# Patient Record
Sex: MALE | Race: WHITE | NOT HISPANIC OR LATINO | Employment: OTHER | URBAN - METROPOLITAN AREA
[De-identification: names, ages, dates, MRNs, and addresses within clinical notes are randomized per-mention and may not be internally consistent; named-entity substitution may affect disease eponyms.]

---

## 2020-09-16 ENCOUNTER — LAB REQUISITION (OUTPATIENT)
Dept: LAB | Facility: HOSPITAL | Age: 70
End: 2020-09-16

## 2020-09-16 DIAGNOSIS — Z00.00 ENCOUNTER FOR GENERAL ADULT MEDICAL EXAMINATION WITHOUT ABNORMAL FINDINGS: ICD-10-CM

## 2020-09-16 LAB
ALBUMIN SERPL-MCNC: 3 G/DL (ref 3.5–5.2)
ALBUMIN/GLOB SERPL: 1.3 G/DL
ALP SERPL-CCNC: 83 U/L (ref 39–117)
ALT SERPL W P-5'-P-CCNC: <5 U/L (ref 1–41)
ANION GAP SERPL CALCULATED.3IONS-SCNC: 9 MMOL/L (ref 5–15)
AST SERPL-CCNC: 16 U/L (ref 1–40)
BASOPHILS # BLD AUTO: 0 10*3/MM3 (ref 0–0.2)
BASOPHILS NFR BLD AUTO: 0.6 % (ref 0–1.5)
BILIRUB SERPL-MCNC: 0.3 MG/DL (ref 0–1.2)
BUN SERPL-MCNC: 15 MG/DL (ref 8–23)
BUN SERPL-MCNC: ABNORMAL MG/DL
BUN/CREAT SERPL: ABNORMAL
CALCIUM SPEC-SCNC: 8.5 MG/DL (ref 8.6–10.5)
CHLORIDE SERPL-SCNC: 108 MMOL/L (ref 98–107)
CO2 SERPL-SCNC: 26 MMOL/L (ref 22–29)
CREAT SERPL-MCNC: 0.71 MG/DL (ref 0.76–1.27)
DEPRECATED RDW RBC AUTO: 43.8 FL (ref 37–54)
EOSINOPHIL # BLD AUTO: 0.1 10*3/MM3 (ref 0–0.4)
EOSINOPHIL NFR BLD AUTO: 1.7 % (ref 0.3–6.2)
ERYTHROCYTE [DISTWIDTH] IN BLOOD BY AUTOMATED COUNT: 14.6 % (ref 12.3–15.4)
GFR SERPL CREATININE-BSD FRML MDRD: 110 ML/MIN/1.73
GFR SERPL CREATININE-BSD FRML MDRD: 134 ML/MIN/1.73
GLOBULIN UR ELPH-MCNC: 2.4 GM/DL
GLUCOSE SERPL-MCNC: 105 MG/DL (ref 65–99)
HCT VFR BLD AUTO: 26.6 % (ref 37.5–51)
HGB BLD-MCNC: 8.9 G/DL (ref 13–17.7)
LYMPHOCYTES # BLD AUTO: 1.3 10*3/MM3 (ref 0.7–3.1)
LYMPHOCYTES NFR BLD AUTO: 16.8 % (ref 19.6–45.3)
MAGNESIUM SERPL-MCNC: 1.7 MG/DL (ref 1.6–2.4)
MCH RBC QN AUTO: 28.6 PG (ref 26.6–33)
MCHC RBC AUTO-ENTMCNC: 33.5 G/DL (ref 31.5–35.7)
MCV RBC AUTO: 85.3 FL (ref 79–97)
MONOCYTES # BLD AUTO: 0.9 10*3/MM3 (ref 0.1–0.9)
MONOCYTES NFR BLD AUTO: 12 % (ref 5–12)
NEUTROPHILS NFR BLD AUTO: 5.4 10*3/MM3 (ref 1.7–7)
NEUTROPHILS NFR BLD AUTO: 68.9 % (ref 42.7–76)
NRBC BLD AUTO-RTO: 0 /100 WBC (ref 0–0.2)
PHOSPHATE SERPL-MCNC: 2.4 MG/DL (ref 2.5–4.5)
PLATELET # BLD AUTO: 389 10*3/MM3 (ref 140–450)
PMV BLD AUTO: 7.3 FL (ref 6–12)
POTASSIUM SERPL-SCNC: 3.8 MMOL/L (ref 3.5–5.2)
PROT SERPL-MCNC: 5.4 G/DL (ref 6–8.5)
RBC # BLD AUTO: 3.12 10*6/MM3 (ref 4.14–5.8)
SODIUM SERPL-SCNC: 143 MMOL/L (ref 136–145)
WBC # BLD AUTO: 7.8 10*3/MM3 (ref 3.4–10.8)

## 2020-09-16 PROCEDURE — 85025 COMPLETE CBC W/AUTO DIFF WBC: CPT | Performed by: PHYSICAL MEDICINE & REHABILITATION

## 2020-09-16 PROCEDURE — 80053 COMPREHEN METABOLIC PANEL: CPT | Performed by: PHYSICAL MEDICINE & REHABILITATION

## 2020-09-16 PROCEDURE — 83735 ASSAY OF MAGNESIUM: CPT | Performed by: PHYSICAL MEDICINE & REHABILITATION

## 2020-09-16 PROCEDURE — 84100 ASSAY OF PHOSPHORUS: CPT | Performed by: PHYSICAL MEDICINE & REHABILITATION

## 2020-09-18 ENCOUNTER — LAB REQUISITION (OUTPATIENT)
Dept: LAB | Facility: HOSPITAL | Age: 70
End: 2020-09-18

## 2020-09-18 DIAGNOSIS — Z00.00 ENCOUNTER FOR GENERAL ADULT MEDICAL EXAMINATION WITHOUT ABNORMAL FINDINGS: ICD-10-CM

## 2020-09-18 LAB
BASOPHILS # BLD AUTO: 0.1 10*3/MM3 (ref 0–0.2)
BASOPHILS NFR BLD AUTO: 0.6 % (ref 0–1.5)
BILIRUB UR QL STRIP: NEGATIVE
CLARITY UR: CLEAR
COLOR UR: YELLOW
DEPRECATED RDW RBC AUTO: 45.9 FL (ref 37–54)
EOSINOPHIL # BLD AUTO: 0.1 10*3/MM3 (ref 0–0.4)
EOSINOPHIL NFR BLD AUTO: 1.3 % (ref 0.3–6.2)
ERYTHROCYTE [DISTWIDTH] IN BLOOD BY AUTOMATED COUNT: 15.1 % (ref 12.3–15.4)
GLUCOSE UR STRIP-MCNC: ABNORMAL MG/DL
HCT VFR BLD AUTO: 28.7 % (ref 37.5–51)
HGB BLD-MCNC: 9.3 G/DL (ref 13–17.7)
HGB UR QL STRIP.AUTO: NEGATIVE
IRON 24H UR-MRATE: 27 MCG/DL (ref 59–158)
KETONES UR QL STRIP: NEGATIVE
LEUKOCYTE ESTERASE UR QL STRIP.AUTO: NEGATIVE
LYMPHOCYTES # BLD AUTO: 1.2 10*3/MM3 (ref 0.7–3.1)
LYMPHOCYTES NFR BLD AUTO: 14 % (ref 19.6–45.3)
MCH RBC QN AUTO: 27.9 PG (ref 26.6–33)
MCHC RBC AUTO-ENTMCNC: 32.6 G/DL (ref 31.5–35.7)
MCV RBC AUTO: 85.7 FL (ref 79–97)
MONOCYTES # BLD AUTO: 1.1 10*3/MM3 (ref 0.1–0.9)
MONOCYTES NFR BLD AUTO: 12.4 % (ref 5–12)
NEUTROPHILS NFR BLD AUTO: 6.3 10*3/MM3 (ref 1.7–7)
NEUTROPHILS NFR BLD AUTO: 71.7 % (ref 42.7–76)
NITRITE UR QL STRIP: NEGATIVE
NRBC BLD AUTO-RTO: 0 /100 WBC (ref 0–0.2)
PH UR STRIP.AUTO: 5.5 [PH] (ref 5–8)
PLATELET # BLD AUTO: 430 10*3/MM3 (ref 140–450)
PMV BLD AUTO: 7.2 FL (ref 6–12)
PROT UR QL STRIP: NEGATIVE
RBC # BLD AUTO: 3.34 10*6/MM3 (ref 4.14–5.8)
SP GR UR STRIP: 1.02 (ref 1–1.03)
UROBILINOGEN UR QL STRIP: ABNORMAL
WBC # BLD AUTO: 8.8 10*3/MM3 (ref 3.4–10.8)

## 2020-09-18 PROCEDURE — 85025 COMPLETE CBC W/AUTO DIFF WBC: CPT

## 2020-09-18 PROCEDURE — 81003 URINALYSIS AUTO W/O SCOPE: CPT

## 2020-09-18 PROCEDURE — 83540 ASSAY OF IRON: CPT

## 2020-09-19 ENCOUNTER — LAB REQUISITION (OUTPATIENT)
Dept: LAB | Facility: HOSPITAL | Age: 70
End: 2020-09-19

## 2020-09-19 DIAGNOSIS — G20 PARKINSON'S DISEASE (HCC): ICD-10-CM

## 2020-09-19 LAB
ANION GAP SERPL CALCULATED.3IONS-SCNC: 11 MMOL/L (ref 5–15)
BUN SERPL-MCNC: 19 MG/DL (ref 8–23)
BUN SERPL-MCNC: ABNORMAL MG/DL
BUN/CREAT SERPL: ABNORMAL
CALCIUM SPEC-SCNC: 8.4 MG/DL (ref 8.6–10.5)
CHLORIDE SERPL-SCNC: 107 MMOL/L (ref 98–107)
CO2 SERPL-SCNC: 27 MMOL/L (ref 22–29)
CREAT SERPL-MCNC: 0.89 MG/DL (ref 0.76–1.27)
GFR SERPL CREATININE-BSD FRML MDRD: 103 ML/MIN/1.73
GFR SERPL CREATININE-BSD FRML MDRD: 85 ML/MIN/1.73
GLUCOSE SERPL-MCNC: 136 MG/DL (ref 65–99)
POTASSIUM SERPL-SCNC: 4.1 MMOL/L (ref 3.5–5.2)
SODIUM SERPL-SCNC: 145 MMOL/L (ref 136–145)

## 2020-09-19 PROCEDURE — 80048 BASIC METABOLIC PNL TOTAL CA: CPT | Performed by: PHYSICAL MEDICINE & REHABILITATION

## 2020-09-21 ENCOUNTER — LAB REQUISITION (OUTPATIENT)
Dept: LAB | Facility: HOSPITAL | Age: 70
End: 2020-09-21

## 2020-09-21 DIAGNOSIS — Z00.00 ENCOUNTER FOR GENERAL ADULT MEDICAL EXAMINATION WITHOUT ABNORMAL FINDINGS: ICD-10-CM

## 2020-09-21 LAB
ANION GAP SERPL CALCULATED.3IONS-SCNC: 8 MMOL/L (ref 5–15)
BACTERIA UR QL AUTO: ABNORMAL /HPF
BILIRUB UR QL STRIP: ABNORMAL
BUN SERPL-MCNC: 21 MG/DL (ref 8–23)
BUN SERPL-MCNC: ABNORMAL MG/DL
BUN/CREAT SERPL: ABNORMAL
CALCIUM SPEC-SCNC: 8.5 MG/DL (ref 8.6–10.5)
CHLORIDE SERPL-SCNC: 108 MMOL/L (ref 98–107)
CLARITY UR: ABNORMAL
CO2 SERPL-SCNC: 27 MMOL/L (ref 22–29)
COLOR UR: ABNORMAL
CREAT SERPL-MCNC: 0.81 MG/DL (ref 0.76–1.27)
DEPRECATED RDW RBC AUTO: 44.6 FL (ref 37–54)
ERYTHROCYTE [DISTWIDTH] IN BLOOD BY AUTOMATED COUNT: 14.9 % (ref 12.3–15.4)
GFR SERPL CREATININE-BSD FRML MDRD: 115 ML/MIN/1.73
GFR SERPL CREATININE-BSD FRML MDRD: 95 ML/MIN/1.73
GLUCOSE SERPL-MCNC: 116 MG/DL (ref 65–99)
GLUCOSE UR STRIP-MCNC: NEGATIVE MG/DL
HCT VFR BLD AUTO: 27.3 % (ref 37.5–51)
HGB BLD-MCNC: 9.3 G/DL (ref 13–17.7)
HGB UR QL STRIP.AUTO: ABNORMAL
HYALINE CASTS UR QL AUTO: ABNORMAL /LPF
KETONES UR QL STRIP: ABNORMAL
LEUKOCYTE ESTERASE UR QL STRIP.AUTO: ABNORMAL
MCH RBC QN AUTO: 29.2 PG (ref 26.6–33)
MCHC RBC AUTO-ENTMCNC: 34.1 G/DL (ref 31.5–35.7)
MCV RBC AUTO: 85.5 FL (ref 79–97)
NITRITE UR QL STRIP: POSITIVE
PH UR STRIP.AUTO: 6 [PH] (ref 5–8)
PLATELET # BLD AUTO: 391 10*3/MM3 (ref 140–450)
PMV BLD AUTO: 7.2 FL (ref 6–12)
POTASSIUM SERPL-SCNC: 4.2 MMOL/L (ref 3.5–5.2)
PROT UR QL STRIP: ABNORMAL
RBC # BLD AUTO: 3.19 10*6/MM3 (ref 4.14–5.8)
RBC # UR: ABNORMAL /HPF
REF LAB TEST METHOD: ABNORMAL
SODIUM SERPL-SCNC: 143 MMOL/L (ref 136–145)
SP GR UR STRIP: >=1.03 (ref 1–1.03)
SQUAMOUS #/AREA URNS HPF: ABNORMAL /HPF
UROBILINOGEN UR QL STRIP: ABNORMAL
WBC # BLD AUTO: 9.9 10*3/MM3 (ref 3.4–10.8)
WBC UR QL AUTO: ABNORMAL /HPF

## 2020-09-21 PROCEDURE — 87077 CULTURE AEROBIC IDENTIFY: CPT | Performed by: PHYSICAL MEDICINE & REHABILITATION

## 2020-09-21 PROCEDURE — 85027 COMPLETE CBC AUTOMATED: CPT | Performed by: PHYSICAL MEDICINE & REHABILITATION

## 2020-09-21 PROCEDURE — 80048 BASIC METABOLIC PNL TOTAL CA: CPT | Performed by: PHYSICAL MEDICINE & REHABILITATION

## 2020-09-21 PROCEDURE — 87186 SC STD MICRODIL/AGAR DIL: CPT | Performed by: PHYSICAL MEDICINE & REHABILITATION

## 2020-09-21 PROCEDURE — 87086 URINE CULTURE/COLONY COUNT: CPT | Performed by: PHYSICAL MEDICINE & REHABILITATION

## 2020-09-21 PROCEDURE — 81001 URINALYSIS AUTO W/SCOPE: CPT | Performed by: PHYSICAL MEDICINE & REHABILITATION

## 2020-09-22 ENCOUNTER — LAB REQUISITION (OUTPATIENT)
Dept: LAB | Facility: HOSPITAL | Age: 70
End: 2020-09-22

## 2020-09-22 DIAGNOSIS — Z00.00 ENCOUNTER FOR GENERAL ADULT MEDICAL EXAMINATION WITHOUT ABNORMAL FINDINGS: ICD-10-CM

## 2020-09-22 LAB
ANION GAP SERPL CALCULATED.3IONS-SCNC: 10 MMOL/L (ref 5–15)
BUN SERPL-MCNC: 25 MG/DL (ref 8–23)
BUN SERPL-MCNC: ABNORMAL MG/DL
BUN/CREAT SERPL: ABNORMAL
CALCIUM SPEC-SCNC: 8.7 MG/DL (ref 8.6–10.5)
CHLORIDE SERPL-SCNC: 106 MMOL/L (ref 98–107)
CO2 SERPL-SCNC: 27 MMOL/L (ref 22–29)
CREAT SERPL-MCNC: 0.97 MG/DL (ref 0.76–1.27)
DEPRECATED RDW RBC AUTO: 45.9 FL (ref 37–54)
ERYTHROCYTE [DISTWIDTH] IN BLOOD BY AUTOMATED COUNT: 15.1 % (ref 12.3–15.4)
GFR SERPL CREATININE-BSD FRML MDRD: 77 ML/MIN/1.73
GFR SERPL CREATININE-BSD FRML MDRD: 93 ML/MIN/1.73
GLUCOSE SERPL-MCNC: 111 MG/DL (ref 65–99)
HCT VFR BLD AUTO: 29.4 % (ref 37.5–51)
HGB BLD-MCNC: 9.8 G/DL (ref 13–17.7)
MCH RBC QN AUTO: 28.5 PG (ref 26.6–33)
MCHC RBC AUTO-ENTMCNC: 33.3 G/DL (ref 31.5–35.7)
MCV RBC AUTO: 85.5 FL (ref 79–97)
PLATELET # BLD AUTO: 418 10*3/MM3 (ref 140–450)
PMV BLD AUTO: 7.2 FL (ref 6–12)
POTASSIUM SERPL-SCNC: 4.3 MMOL/L (ref 3.5–5.2)
RBC # BLD AUTO: 3.44 10*6/MM3 (ref 4.14–5.8)
SODIUM SERPL-SCNC: 143 MMOL/L (ref 136–145)
WBC # BLD AUTO: 10 10*3/MM3 (ref 3.4–10.8)

## 2020-09-22 PROCEDURE — 85027 COMPLETE CBC AUTOMATED: CPT

## 2020-09-22 PROCEDURE — 80048 BASIC METABOLIC PNL TOTAL CA: CPT

## 2020-09-23 ENCOUNTER — LAB REQUISITION (OUTPATIENT)
Dept: LAB | Facility: HOSPITAL | Age: 70
End: 2020-09-23

## 2020-09-23 DIAGNOSIS — Z00.00 ENCOUNTER FOR GENERAL ADULT MEDICAL EXAMINATION WITHOUT ABNORMAL FINDINGS: ICD-10-CM

## 2020-09-23 LAB
ANION GAP SERPL CALCULATED.3IONS-SCNC: 10 MMOL/L (ref 5–15)
BASOPHILS # BLD AUTO: 0 10*3/MM3 (ref 0–0.2)
BASOPHILS NFR BLD AUTO: 0.5 % (ref 0–1.5)
BUN SERPL-MCNC: 26 MG/DL (ref 8–23)
BUN SERPL-MCNC: ABNORMAL MG/DL
BUN/CREAT SERPL: ABNORMAL
CALCIUM SPEC-SCNC: 8.5 MG/DL (ref 8.6–10.5)
CHLORIDE SERPL-SCNC: 105 MMOL/L (ref 98–107)
CO2 SERPL-SCNC: 25 MMOL/L (ref 22–29)
CREAT SERPL-MCNC: 0.97 MG/DL (ref 0.76–1.27)
DEPRECATED RDW RBC AUTO: 47.3 FL (ref 37–54)
EOSINOPHIL # BLD AUTO: 0 10*3/MM3 (ref 0–0.4)
EOSINOPHIL NFR BLD AUTO: 0.4 % (ref 0.3–6.2)
ERYTHROCYTE [DISTWIDTH] IN BLOOD BY AUTOMATED COUNT: 15.5 % (ref 12.3–15.4)
GFR SERPL CREATININE-BSD FRML MDRD: 77 ML/MIN/1.73
GFR SERPL CREATININE-BSD FRML MDRD: 93 ML/MIN/1.73
GLUCOSE SERPL-MCNC: 96 MG/DL (ref 65–99)
HCT VFR BLD AUTO: 32.6 % (ref 37.5–51)
HGB BLD-MCNC: 10.6 G/DL (ref 13–17.7)
LYMPHOCYTES # BLD AUTO: 1.4 10*3/MM3 (ref 0.7–3.1)
LYMPHOCYTES NFR BLD AUTO: 14.8 % (ref 19.6–45.3)
MCH RBC QN AUTO: 27.8 PG (ref 26.6–33)
MCHC RBC AUTO-ENTMCNC: 32.4 G/DL (ref 31.5–35.7)
MCV RBC AUTO: 85.9 FL (ref 79–97)
MONOCYTES # BLD AUTO: 1 10*3/MM3 (ref 0.1–0.9)
MONOCYTES NFR BLD AUTO: 10.1 % (ref 5–12)
NEUTROPHILS NFR BLD AUTO: 7 10*3/MM3 (ref 1.7–7)
NEUTROPHILS NFR BLD AUTO: 74.2 % (ref 42.7–76)
NRBC BLD AUTO-RTO: 0.1 /100 WBC (ref 0–0.2)
PLATELET # BLD AUTO: 454 10*3/MM3 (ref 140–450)
PMV BLD AUTO: 7.5 FL (ref 6–12)
POTASSIUM SERPL-SCNC: 4.4 MMOL/L (ref 3.5–5.2)
RBC # BLD AUTO: 3.8 10*6/MM3 (ref 4.14–5.8)
SODIUM SERPL-SCNC: 140 MMOL/L (ref 136–145)
WBC # BLD AUTO: 9.4 10*3/MM3 (ref 3.4–10.8)

## 2020-09-23 PROCEDURE — 85025 COMPLETE CBC W/AUTO DIFF WBC: CPT | Performed by: PHYSICAL MEDICINE & REHABILITATION

## 2020-09-23 PROCEDURE — U0004 COV-19 TEST NON-CDC HGH THRU: HCPCS

## 2020-09-23 PROCEDURE — 80048 BASIC METABOLIC PNL TOTAL CA: CPT | Performed by: PHYSICAL MEDICINE & REHABILITATION

## 2020-09-24 LAB
BACTERIA SPEC AEROBE CULT: ABNORMAL
BACTERIA SPEC AEROBE CULT: ABNORMAL

## 2020-09-25 LAB — SARS-COV-2 RNA NOSE QL NAA+PROBE: NOT DETECTED

## 2020-09-28 ENCOUNTER — LAB REQUISITION (OUTPATIENT)
Dept: LAB | Facility: HOSPITAL | Age: 70
End: 2020-09-28

## 2020-09-28 DIAGNOSIS — Z00.00 ENCOUNTER FOR GENERAL ADULT MEDICAL EXAMINATION WITHOUT ABNORMAL FINDINGS: ICD-10-CM

## 2020-09-28 LAB
ANION GAP SERPL CALCULATED.3IONS-SCNC: 13 MMOL/L (ref 5–15)
BASOPHILS # BLD AUTO: 0 10*3/MM3 (ref 0–0.2)
BASOPHILS NFR BLD AUTO: 0.4 % (ref 0–1.5)
BUN SERPL-MCNC: 30 MG/DL (ref 8–23)
BUN SERPL-MCNC: ABNORMAL MG/DL
BUN/CREAT SERPL: ABNORMAL
CALCIUM SPEC-SCNC: 8.7 MG/DL (ref 8.6–10.5)
CHLORIDE SERPL-SCNC: 103 MMOL/L (ref 98–107)
CO2 SERPL-SCNC: 24 MMOL/L (ref 22–29)
CREAT SERPL-MCNC: 1.22 MG/DL (ref 0.76–1.27)
DEPRECATED RDW RBC AUTO: 47.7 FL (ref 37–54)
EOSINOPHIL # BLD AUTO: 0.1 10*3/MM3 (ref 0–0.4)
EOSINOPHIL NFR BLD AUTO: 0.7 % (ref 0.3–6.2)
ERYTHROCYTE [DISTWIDTH] IN BLOOD BY AUTOMATED COUNT: 15.8 % (ref 12.3–15.4)
GFR SERPL CREATININE-BSD FRML MDRD: 59 ML/MIN/1.73
GFR SERPL CREATININE-BSD FRML MDRD: 72 ML/MIN/1.73
GLUCOSE SERPL-MCNC: 136 MG/DL (ref 65–99)
HCT VFR BLD AUTO: 32 % (ref 37.5–51)
HGB BLD-MCNC: 10.3 G/DL (ref 13–17.7)
LYMPHOCYTES # BLD AUTO: 2 10*3/MM3 (ref 0.7–3.1)
LYMPHOCYTES NFR BLD AUTO: 22.3 % (ref 19.6–45.3)
MCH RBC QN AUTO: 27.5 PG (ref 26.6–33)
MCHC RBC AUTO-ENTMCNC: 32.2 G/DL (ref 31.5–35.7)
MCV RBC AUTO: 85.5 FL (ref 79–97)
MONOCYTES # BLD AUTO: 1.3 10*3/MM3 (ref 0.1–0.9)
MONOCYTES NFR BLD AUTO: 13.8 % (ref 5–12)
NEUTROPHILS NFR BLD AUTO: 5.7 10*3/MM3 (ref 1.7–7)
NEUTROPHILS NFR BLD AUTO: 62.8 % (ref 42.7–76)
NRBC BLD AUTO-RTO: 0.2 /100 WBC (ref 0–0.2)
PLATELET # BLD AUTO: 352 10*3/MM3 (ref 140–450)
PMV BLD AUTO: 7.7 FL (ref 6–12)
POTASSIUM SERPL-SCNC: 3.9 MMOL/L (ref 3.5–5.2)
RBC # BLD AUTO: 3.74 10*6/MM3 (ref 4.14–5.8)
SODIUM SERPL-SCNC: 140 MMOL/L (ref 136–145)
WBC # BLD AUTO: 9.1 10*3/MM3 (ref 3.4–10.8)

## 2020-09-28 PROCEDURE — 85025 COMPLETE CBC W/AUTO DIFF WBC: CPT | Performed by: PHYSICAL MEDICINE & REHABILITATION

## 2020-09-28 PROCEDURE — 80048 BASIC METABOLIC PNL TOTAL CA: CPT | Performed by: PHYSICAL MEDICINE & REHABILITATION

## 2020-09-30 ENCOUNTER — LAB REQUISITION (OUTPATIENT)
Dept: LAB | Facility: HOSPITAL | Age: 70
End: 2020-09-30

## 2020-09-30 DIAGNOSIS — Z00.00 ENCOUNTER FOR GENERAL ADULT MEDICAL EXAMINATION WITHOUT ABNORMAL FINDINGS: ICD-10-CM

## 2020-09-30 LAB
ANION GAP SERPL CALCULATED.3IONS-SCNC: 11 MMOL/L (ref 5–15)
BUN SERPL-MCNC: 32 MG/DL (ref 8–23)
BUN SERPL-MCNC: ABNORMAL MG/DL
BUN/CREAT SERPL: ABNORMAL
CALCIUM SPEC-SCNC: 9.2 MG/DL (ref 8.6–10.5)
CHLORIDE SERPL-SCNC: 104 MMOL/L (ref 98–107)
CO2 SERPL-SCNC: 27 MMOL/L (ref 22–29)
CREAT SERPL-MCNC: 1.15 MG/DL (ref 0.76–1.27)
DEPRECATED RDW RBC AUTO: 46.4 FL (ref 37–54)
ERYTHROCYTE [DISTWIDTH] IN BLOOD BY AUTOMATED COUNT: 15.2 % (ref 12.3–15.4)
GFR SERPL CREATININE-BSD FRML MDRD: 63 ML/MIN/1.73
GFR SERPL CREATININE-BSD FRML MDRD: 77 ML/MIN/1.73
GLUCOSE SERPL-MCNC: 129 MG/DL (ref 65–99)
HCT VFR BLD AUTO: 29.9 % (ref 37.5–51)
HGB BLD-MCNC: 9.7 G/DL (ref 13–17.7)
MCH RBC QN AUTO: 27.9 PG (ref 26.6–33)
MCHC RBC AUTO-ENTMCNC: 32.5 G/DL (ref 31.5–35.7)
MCV RBC AUTO: 85.7 FL (ref 79–97)
PLATELET # BLD AUTO: 288 10*3/MM3 (ref 140–450)
PMV BLD AUTO: 8.2 FL (ref 6–12)
POTASSIUM SERPL-SCNC: 4.8 MMOL/L (ref 3.5–5.2)
RBC # BLD AUTO: 3.49 10*6/MM3 (ref 4.14–5.8)
SODIUM SERPL-SCNC: 142 MMOL/L (ref 136–145)
WBC # BLD AUTO: 7.6 10*3/MM3 (ref 3.4–10.8)

## 2020-09-30 PROCEDURE — 80048 BASIC METABOLIC PNL TOTAL CA: CPT | Performed by: PHYSICAL MEDICINE & REHABILITATION

## 2020-09-30 PROCEDURE — 85027 COMPLETE CBC AUTOMATED: CPT | Performed by: PHYSICAL MEDICINE & REHABILITATION

## 2020-10-01 ENCOUNTER — HOSPITAL ENCOUNTER (OUTPATIENT)
Dept: GENERAL RADIOLOGY | Facility: HOSPITAL | Age: 70
Discharge: HOME | End: 2020-10-01
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
## 2020-10-01 PROCEDURE — 72110 X-RAY EXAM L-2 SPINE 4/>VWS: CPT

## 2020-10-01 PROCEDURE — 72072 X-RAY EXAM THORAC SPINE 3VWS: CPT

## 2020-10-02 ENCOUNTER — LAB REQUISITION (OUTPATIENT)
Dept: LAB | Facility: HOSPITAL | Age: 70
End: 2020-10-02

## 2020-10-02 DIAGNOSIS — Z00.00 ENCOUNTER FOR GENERAL ADULT MEDICAL EXAMINATION WITHOUT ABNORMAL FINDINGS: ICD-10-CM

## 2020-10-02 LAB
ANION GAP SERPL CALCULATED.3IONS-SCNC: 10 MMOL/L (ref 5–15)
BUN SERPL-MCNC: 25 MG/DL (ref 8–23)
BUN SERPL-MCNC: ABNORMAL MG/DL
BUN/CREAT SERPL: ABNORMAL
CALCIUM SPEC-SCNC: 8.6 MG/DL (ref 8.6–10.5)
CHLORIDE SERPL-SCNC: 105 MMOL/L (ref 98–107)
CO2 SERPL-SCNC: 28 MMOL/L (ref 22–29)
CREAT SERPL-MCNC: 0.78 MG/DL (ref 0.76–1.27)
GFR SERPL CREATININE-BSD FRML MDRD: 99 ML/MIN/1.73
GLUCOSE SERPL-MCNC: 102 MG/DL (ref 65–99)
POTASSIUM SERPL-SCNC: 4.2 MMOL/L (ref 3.5–5.2)
SODIUM SERPL-SCNC: 143 MMOL/L (ref 136–145)

## 2020-10-02 PROCEDURE — 80048 BASIC METABOLIC PNL TOTAL CA: CPT

## 2024-10-11 ENCOUNTER — APPOINTMENT (OUTPATIENT)
Dept: GENERAL RADIOLOGY | Facility: HOSPITAL | Age: 74
End: 2024-10-11
Payer: MEDICARE

## 2024-10-11 ENCOUNTER — HOSPITAL ENCOUNTER (INPATIENT)
Facility: HOSPITAL | Age: 74
LOS: 1 days | Discharge: NURSING, REHAB OR LONG-TERM CARE | End: 2024-10-14
Attending: EMERGENCY MEDICINE | Admitting: INTERNAL MEDICINE
Payer: MEDICARE

## 2024-10-11 DIAGNOSIS — N39.0 URINARY TRACT INFECTION IN MALE: Primary | ICD-10-CM

## 2024-10-11 DIAGNOSIS — Z86.59 HISTORY OF DEMENTIA: ICD-10-CM

## 2024-10-11 DIAGNOSIS — G93.40 ACUTE ENCEPHALOPATHY: ICD-10-CM

## 2024-10-11 LAB
ALBUMIN SERPL-MCNC: 3.9 G/DL (ref 3.5–5.2)
ALBUMIN/GLOB SERPL: 1.2 G/DL
ALP SERPL-CCNC: 122 U/L (ref 39–117)
ALT SERPL W P-5'-P-CCNC: 9 U/L (ref 1–41)
ANION GAP SERPL CALCULATED.3IONS-SCNC: 9 MMOL/L (ref 5–15)
AST SERPL-CCNC: 8 U/L (ref 1–40)
B-OH-BUTYR SERPL-SCNC: 0.1 MMOL/L (ref 0.02–0.27)
BASOPHILS # BLD AUTO: 0.04 10*3/MM3 (ref 0–0.2)
BASOPHILS NFR BLD AUTO: 0.4 % (ref 0–1.5)
BILIRUB SERPL-MCNC: 0.3 MG/DL (ref 0–1.2)
BUN SERPL-MCNC: 21 MG/DL (ref 8–23)
BUN/CREAT SERPL: 16.5 (ref 7–25)
CALCIUM SPEC-SCNC: 9.7 MG/DL (ref 8.6–10.5)
CHLORIDE SERPL-SCNC: 106 MMOL/L (ref 98–107)
CO2 SERPL-SCNC: 27 MMOL/L (ref 22–29)
CREAT SERPL-MCNC: 1.27 MG/DL (ref 0.76–1.27)
DEPRECATED RDW RBC AUTO: 46.4 FL (ref 37–54)
EGFRCR SERPLBLD CKD-EPI 2021: 60 ML/MIN/1.73
EOSINOPHIL # BLD AUTO: 0.12 10*3/MM3 (ref 0–0.4)
EOSINOPHIL NFR BLD AUTO: 1.2 % (ref 0.3–6.2)
ERYTHROCYTE [DISTWIDTH] IN BLOOD BY AUTOMATED COUNT: 14.7 % (ref 12.3–15.4)
GLOBULIN UR ELPH-MCNC: 3.2 GM/DL
GLUCOSE SERPL-MCNC: 126 MG/DL (ref 65–99)
HCT VFR BLD AUTO: 39.3 % (ref 37.5–51)
HGB BLD-MCNC: 13.1 G/DL (ref 13–17.7)
IMM GRANULOCYTES # BLD AUTO: 0.05 10*3/MM3 (ref 0–0.05)
IMM GRANULOCYTES NFR BLD AUTO: 0.5 % (ref 0–0.5)
LYMPHOCYTES # BLD AUTO: 2.89 10*3/MM3 (ref 0.7–3.1)
LYMPHOCYTES NFR BLD AUTO: 28.9 % (ref 19.6–45.3)
MAGNESIUM SERPL-MCNC: 2.4 MG/DL (ref 1.6–2.4)
MCH RBC QN AUTO: 29 PG (ref 26.6–33)
MCHC RBC AUTO-ENTMCNC: 33.3 G/DL (ref 31.5–35.7)
MCV RBC AUTO: 87.1 FL (ref 79–97)
MONOCYTES # BLD AUTO: 1.19 10*3/MM3 (ref 0.1–0.9)
MONOCYTES NFR BLD AUTO: 11.9 % (ref 5–12)
NEUTROPHILS NFR BLD AUTO: 5.7 10*3/MM3 (ref 1.7–7)
NEUTROPHILS NFR BLD AUTO: 57.1 % (ref 42.7–76)
NRBC BLD AUTO-RTO: 0 /100 WBC (ref 0–0.2)
PLATELET # BLD AUTO: 237 10*3/MM3 (ref 140–450)
PMV BLD AUTO: 9.7 FL (ref 6–12)
POTASSIUM SERPL-SCNC: 4.1 MMOL/L (ref 3.5–5.2)
PROT SERPL-MCNC: 7.1 G/DL (ref 6–8.5)
RBC # BLD AUTO: 4.51 10*6/MM3 (ref 4.14–5.8)
SODIUM SERPL-SCNC: 142 MMOL/L (ref 136–145)
WBC NRBC COR # BLD AUTO: 9.99 10*3/MM3 (ref 3.4–10.8)

## 2024-10-11 PROCEDURE — 71045 X-RAY EXAM CHEST 1 VIEW: CPT

## 2024-10-11 PROCEDURE — 85025 COMPLETE CBC W/AUTO DIFF WBC: CPT | Performed by: EMERGENCY MEDICINE

## 2024-10-11 PROCEDURE — 83735 ASSAY OF MAGNESIUM: CPT | Performed by: EMERGENCY MEDICINE

## 2024-10-11 PROCEDURE — 82010 KETONE BODYS QUAN: CPT | Performed by: EMERGENCY MEDICINE

## 2024-10-11 PROCEDURE — 99285 EMERGENCY DEPT VISIT HI MDM: CPT

## 2024-10-11 PROCEDURE — 80053 COMPREHEN METABOLIC PANEL: CPT | Performed by: EMERGENCY MEDICINE

## 2024-10-12 PROBLEM — G20.A1 PARKINSON DISEASE: Status: ACTIVE | Noted: 2024-10-12

## 2024-10-12 PROBLEM — N39.0 URINARY TRACT INFECTION IN MALE: Status: ACTIVE | Noted: 2024-10-12

## 2024-10-12 PROBLEM — Z97.8 CHRONIC INDWELLING FOLEY CATHETER: Status: ACTIVE | Noted: 2024-10-12

## 2024-10-12 PROBLEM — F03.90 DEMENTIA: Status: ACTIVE | Noted: 2024-10-12

## 2024-10-12 LAB
ALBUMIN SERPL-MCNC: 3.5 G/DL (ref 3.5–5.2)
ALBUMIN/GLOB SERPL: 1.2 G/DL
ALP SERPL-CCNC: 107 U/L (ref 39–117)
ALT SERPL W P-5'-P-CCNC: 12 U/L (ref 1–41)
ANION GAP SERPL CALCULATED.3IONS-SCNC: 8.9 MMOL/L (ref 5–15)
AST SERPL-CCNC: 14 U/L (ref 1–40)
BACTERIA UR QL AUTO: ABNORMAL /HPF
BILIRUB SERPL-MCNC: 0.2 MG/DL (ref 0–1.2)
BILIRUB UR QL STRIP: NEGATIVE
BUN SERPL-MCNC: 19 MG/DL (ref 8–23)
BUN/CREAT SERPL: 15.2 (ref 7–25)
CALCIUM SPEC-SCNC: 8.7 MG/DL (ref 8.6–10.5)
CHLORIDE SERPL-SCNC: 109 MMOL/L (ref 98–107)
CLARITY UR: ABNORMAL
CO2 SERPL-SCNC: 27.1 MMOL/L (ref 22–29)
COLOR UR: YELLOW
CREAT SERPL-MCNC: 1.25 MG/DL (ref 0.76–1.27)
DEPRECATED RDW RBC AUTO: 48.6 FL (ref 37–54)
EGFRCR SERPLBLD CKD-EPI 2021: 61.2 ML/MIN/1.73
ERYTHROCYTE [DISTWIDTH] IN BLOOD BY AUTOMATED COUNT: 15 % (ref 12.3–15.4)
GLOBULIN UR ELPH-MCNC: 3 GM/DL
GLUCOSE BLDC GLUCOMTR-MCNC: 91 MG/DL (ref 70–130)
GLUCOSE SERPL-MCNC: 78 MG/DL (ref 65–99)
GLUCOSE UR STRIP-MCNC: NEGATIVE MG/DL
HCT VFR BLD AUTO: 37.9 % (ref 37.5–51)
HGB BLD-MCNC: 12.2 G/DL (ref 13–17.7)
HGB UR QL STRIP.AUTO: ABNORMAL
HYALINE CASTS UR QL AUTO: ABNORMAL /LPF
KETONES UR QL STRIP: ABNORMAL
LEUKOCYTE ESTERASE UR QL STRIP.AUTO: ABNORMAL
MCH RBC QN AUTO: 28.6 PG (ref 26.6–33)
MCHC RBC AUTO-ENTMCNC: 32.2 G/DL (ref 31.5–35.7)
MCV RBC AUTO: 88.8 FL (ref 79–97)
NITRITE UR QL STRIP: POSITIVE
PH UR STRIP.AUTO: 5.5 [PH] (ref 5–8)
PLATELET # BLD AUTO: 214 10*3/MM3 (ref 140–450)
PMV BLD AUTO: 9.5 FL (ref 6–12)
POTASSIUM SERPL-SCNC: 4.1 MMOL/L (ref 3.5–5.2)
PROT SERPL-MCNC: 6.5 G/DL (ref 6–8.5)
PROT UR QL STRIP: ABNORMAL
RBC # BLD AUTO: 4.27 10*6/MM3 (ref 4.14–5.8)
RBC # UR STRIP: ABNORMAL /HPF
REF LAB TEST METHOD: ABNORMAL
SODIUM SERPL-SCNC: 145 MMOL/L (ref 136–145)
SP GR UR STRIP: 1.02 (ref 1–1.03)
SQUAMOUS #/AREA URNS HPF: ABNORMAL /HPF
UROBILINOGEN UR QL STRIP: ABNORMAL
WBC # UR STRIP: ABNORMAL /HPF
WBC NRBC COR # BLD AUTO: 11.24 10*3/MM3 (ref 3.4–10.8)

## 2024-10-12 PROCEDURE — P9612 CATHETERIZE FOR URINE SPEC: HCPCS

## 2024-10-12 PROCEDURE — 87086 URINE CULTURE/COLONY COUNT: CPT | Performed by: EMERGENCY MEDICINE

## 2024-10-12 PROCEDURE — 85027 COMPLETE CBC AUTOMATED: CPT | Performed by: NURSE PRACTITIONER

## 2024-10-12 PROCEDURE — G0378 HOSPITAL OBSERVATION PER HR: HCPCS

## 2024-10-12 PROCEDURE — 80053 COMPREHEN METABOLIC PANEL: CPT | Performed by: NURSE PRACTITIONER

## 2024-10-12 PROCEDURE — 87077 CULTURE AEROBIC IDENTIFY: CPT | Performed by: EMERGENCY MEDICINE

## 2024-10-12 PROCEDURE — 81001 URINALYSIS AUTO W/SCOPE: CPT | Performed by: EMERGENCY MEDICINE

## 2024-10-12 PROCEDURE — 87186 SC STD MICRODIL/AGAR DIL: CPT | Performed by: EMERGENCY MEDICINE

## 2024-10-12 PROCEDURE — 25010000002 CEFTRIAXONE PER 250 MG: Performed by: EMERGENCY MEDICINE

## 2024-10-12 PROCEDURE — 82948 REAGENT STRIP/BLOOD GLUCOSE: CPT

## 2024-10-12 RX ORDER — BISACODYL 5 MG/1
5 TABLET, DELAYED RELEASE ORAL DAILY PRN
Status: DISCONTINUED | OUTPATIENT
Start: 2024-10-12 | End: 2024-10-14 | Stop reason: HOSPADM

## 2024-10-12 RX ORDER — ACETAMINOPHEN 160 MG/5ML
650 SOLUTION ORAL EVERY 4 HOURS PRN
Status: DISCONTINUED | OUTPATIENT
Start: 2024-10-12 | End: 2024-10-14 | Stop reason: HOSPADM

## 2024-10-12 RX ORDER — ACETAMINOPHEN 325 MG/1
650 TABLET ORAL EVERY 4 HOURS PRN
Status: DISCONTINUED | OUTPATIENT
Start: 2024-10-12 | End: 2024-10-14 | Stop reason: HOSPADM

## 2024-10-12 RX ORDER — NITROGLYCERIN 0.4 MG/1
0.4 TABLET SUBLINGUAL
Status: DISCONTINUED | OUTPATIENT
Start: 2024-10-12 | End: 2024-10-14 | Stop reason: HOSPADM

## 2024-10-12 RX ORDER — SODIUM CHLORIDE 0.9 % (FLUSH) 0.9 %
10 SYRINGE (ML) INJECTION AS NEEDED
Status: DISCONTINUED | OUTPATIENT
Start: 2024-10-12 | End: 2024-10-14 | Stop reason: HOSPADM

## 2024-10-12 RX ORDER — BISACODYL 10 MG
10 SUPPOSITORY, RECTAL RECTAL DAILY PRN
Status: DISCONTINUED | OUTPATIENT
Start: 2024-10-12 | End: 2024-10-14 | Stop reason: HOSPADM

## 2024-10-12 RX ORDER — FAMOTIDINE 20 MG/1
20 TABLET, FILM COATED ORAL 2 TIMES DAILY PRN
Status: DISCONTINUED | OUTPATIENT
Start: 2024-10-12 | End: 2024-10-14 | Stop reason: HOSPADM

## 2024-10-12 RX ORDER — ACETAMINOPHEN 650 MG/1
650 SUPPOSITORY RECTAL EVERY 4 HOURS PRN
Status: DISCONTINUED | OUTPATIENT
Start: 2024-10-12 | End: 2024-10-14 | Stop reason: HOSPADM

## 2024-10-12 RX ORDER — POLYETHYLENE GLYCOL 3350 17 G/17G
17 POWDER, FOR SOLUTION ORAL DAILY PRN
Status: DISCONTINUED | OUTPATIENT
Start: 2024-10-12 | End: 2024-10-14 | Stop reason: HOSPADM

## 2024-10-12 RX ORDER — AMOXICILLIN 250 MG
2 CAPSULE ORAL 2 TIMES DAILY PRN
Status: DISCONTINUED | OUTPATIENT
Start: 2024-10-12 | End: 2024-10-14 | Stop reason: HOSPADM

## 2024-10-12 RX ORDER — ONDANSETRON 2 MG/ML
4 INJECTION INTRAMUSCULAR; INTRAVENOUS EVERY 6 HOURS PRN
Status: DISCONTINUED | OUTPATIENT
Start: 2024-10-12 | End: 2024-10-14 | Stop reason: HOSPADM

## 2024-10-12 RX ADMIN — CEFTRIAXONE 2000 MG: 2 INJECTION, POWDER, FOR SOLUTION INTRAMUSCULAR; INTRAVENOUS at 02:40

## 2024-10-12 NOTE — ED NOTES
"Nursing report ED to floor  Singh Hatch  74 y.o.  male    HPI :  HPI  Stated Reason for Visit: [facility].  Per NH staff BG was 358, now 154 for EMS.  Pt is now at baseline A&O x4 (does not know year, knows month).  Per EMS stroke scale was negative. Pt with hx of Parkinson's, bladder ca, and dementia.  History Obtained From: EMS    Chief Complaint  Chief Complaint   Patient presents with    Altered Mental Status    Hyperglycemia       Admitting doctor:   Phong West MD    Admitting diagnosis:   The primary encounter diagnosis was Urinary tract infection in male. Diagnoses of Acute encephalopathy and History of dementia were also pertinent to this visit.    Code status:   Current Code Status       Date Active Code Status Order ID Comments User Context       Not on file            Allergies:   Patient has no known allergies.    Isolation:   No active isolations    Intake and Output  No intake or output data in the 24 hours ending 10/12/24 0220    Weight:       10/11/24  2301   Weight: 70.3 kg (155 lb)       Most recent vitals:   Vitals:    10/11/24 2147 10/11/24 2301 10/12/24 0001   BP: 128/70  122/77   Pulse: 90  74   Resp: 16     Temp: 98.8 °F (37.1 °C)     TempSrc: Tympanic     SpO2: 98%  97%   Weight:  70.3 kg (155 lb)    Height:  175.3 cm (69\")        Active LDAs/IV Access:   Lines, Drains & Airways       Active LDAs       Name Placement date Placement time Site Days    Peripheral IV 10/11/24 2300 Anterior;Proximal;Right Forearm 10/11/24  2300  Forearm  less than 1                    Labs (abnormal labs have a star):   Labs Reviewed   COMPREHENSIVE METABOLIC PANEL - Abnormal; Notable for the following components:       Result Value    Glucose 126 (*)     Alkaline Phosphatase 122 (*)     eGFR 60.0 (*)     All other components within normal limits    Narrative:     GFR Normal >60  Chronic Kidney Disease <60  Kidney Failure <15    The GFR formula is " only valid for adults with stable renal function between ages 18 and 70.   URINALYSIS W/ MICROSCOPIC IF INDICATED (NO CULTURE) - Abnormal; Notable for the following components:    Appearance, UA Turbid (*)     Ketones, UA Trace (*)     Blood, UA Moderate (2+) (*)     Protein,  mg/dL (2+) (*)     Leuk Esterase, UA Large (3+) (*)     Nitrite, UA Positive (*)     All other components within normal limits   CBC WITH AUTO DIFFERENTIAL - Abnormal; Notable for the following components:    Monocytes, Absolute 1.19 (*)     All other components within normal limits   URINALYSIS, MICROSCOPIC ONLY - Abnormal; Notable for the following components:    RBC, UA 11-20 (*)     WBC, UA Too Numerous to Count (*)     Bacteria, UA 4+ (*)     Squamous Epithelial Cells, UA 7-12 (*)     All other components within normal limits   MAGNESIUM - Normal   BETA HYDROXYBUTYRATE QUANTITATIVE - Normal    Narrative:     In the assessment of possible diabetic ketoacidosis, the test should be interpreted along with other clinical and laboratory findings.  A level greater than 1 mmol/L should require further evaluation and levels of more than 3 mmol/L require immediate medical review.   URINALYSIS W/ CULTURE IF INDICATED   CBC AND DIFFERENTIAL    Narrative:     The following orders were created for panel order CBC & Differential.  Procedure                               Abnormality         Status                     ---------                               -----------         ------                     CBC Auto Differential[[id number]]        Abnormal            Final result                 Please view results for these tests on the individual orders.   KETONE BODIES SERUM    Narrative:     The following orders were created for panel order Ketone Bodies, Serum (Not performed at [place]).  Procedure                               Abnormality         Status                     ---------                               -----------         ------                      Beta Hydroxybutyrate Dick...[[id number]]  Normal              Final result                 Please view results for these tests on the individual orders.       EKG:   No orders to display       Meds given in ED:   Medications   cefTRIAXone (ROCEPHIN) 2,000 mg in sodium chloride 0.9 % 100 mL MBP (has no administration in time range)       Imaging results:  XR Chest 1 View    Result Date: 10/11/2024  No acute findings.  This report was finalized on 10/11/2024 10:47 PM by Dr. Marlene Krishnan M.D on Workstation: [id number]       Ambulatory status:   - bed rest    Social issues:   Social History     Socioeconomic History    Marital status:        Peripheral Neurovascular  Peripheral Neurovascular (Adult)  Peripheral Neurovascular WDL: WDL    Neuro Cognitive  Neuro Cognitive (Adult)  Cognitive/Neuro/Behavioral WDL: .WDL except, orientation, level of consciousness  Level of Consciousness: Responds to Voice  Orientation: disoriented to, person, place    Learning  Learning Assessment  Learning Readiness and Ability: physical limitation noted, cognitive limitation noted    Respiratory  Respiratory WDL  Respiratory WDL: WDL    Abdominal Pain  Safety Interventions  Safety Precautions/Falls Reduction: assistive device/personal items within reach, nonskid shoes/slippers when out of bed, fall reduction program maintained, family at bedside    Pain Assessments  Pain (Adult)  (0-10) Pain Rating: Rest: 0  (0-10) Pain Rating: Activity: 0    NIH Stroke Scale       Tiffany Menendez RN  10/12/24 02:20 EDT

## 2024-10-12 NOTE — ED PROVIDER NOTES
EMERGENCY DEPARTMENT ENCOUNTER    Room Number:  21/21  PCP: Kimberly Tovar DO  Historian: EMS      HPI:  Chief Complaint: Ultimately status, hyperglycemia  A complete HPI/ROS/PMH/PSH/SH/FH are unobtainable due to: Patient condition    Context: Singh Hatch is a 74 y.o. male who presents to the ED via [facility] from [facility] for hyperglycemia and unresponsiveness c/o acute hyperglycemia of 358 with unresponsiveness, improved at 154 with EMS prior to arrival.  History of Parkinson's and bladder cancer and dementia.  Patient with indwelling Holland catheter that appears to be infected.  Patient unable to provide any review of systems at this time.      MEDICAL RECORD REVIEW    External (non-ED) record review: Discharge summary reviewed from August 27, 2024 from [place] after being admitted August 23, 2024 for severe sepsis with a UTI              PAST MEDICAL HISTORY  Active Ambulatory Problems     Diagnosis Date Noted    No Active Ambulatory Problems     Resolved Ambulatory Problems     Diagnosis Date Noted    No Resolved Ambulatory Problems     No Additional Past Medical History         PAST SURGICAL HISTORY  History reviewed. No pertinent surgical history.      FAMILY HISTORY  History reviewed. No pertinent family history.      SOCIAL HISTORY  Social History     Socioeconomic History    Marital status:          ALLERGIES  Patient has no known allergies.        REVIEW OF SYSTEMS  Review of Systems     All systems reviewed and negative except for those discussed in HPI.       PHYSICAL EXAM    I have reviewed the triage vital signs and nursing notes.    ED Triage Vitals [10/11/24 2147]   Temp Heart Rate Resp BP SpO2   98.8 °F (37.1 °C) 90 16 128/70 98 %      Temp src Heart Rate Source Patient Position BP Location FiO2 (%)   Tympanic -- -- -- --       Physical Exam  General: Chronically ill-appearing, nondiaphoretic  HEENT: Mucous membranes tacky, atraumatic, EOMI  Neck: Full ROM  Pulm: Symmetric  chest rise, nonlabored, lungs CTAB  Cardiovascular: Regular rate and rhythm, intact distal pulses  GI: Soft, nontender, nondistended, no rebound, no guarding, bowel sounds present  : Indwelling Holland catheter with cloudy urine with sediment  MSK: Full ROM, no deformity  Skin: Warm, dry  Neuro: Drowsy, moving all extremities  Psych: Calm, cooperative        LAB RESULTS  Recent Results (from the past 24 hours)   Comprehensive Metabolic Panel    Collection Time: 10/11/24 11:00 PM    Specimen: Blood   Result Value Ref Range    Glucose 126 (H) 65 - 99 mg/dL    BUN 21 8 - 23 mg/dL    Creatinine 1.27 0.76 - 1.27 mg/dL    Sodium 142 136 - 145 mmol/L    Potassium 4.1 3.5 - 5.2 mmol/L    Chloride 106 98 - 107 mmol/L    CO2 27.0 22.0 - 29.0 mmol/L    Calcium 9.7 8.6 - 10.5 mg/dL    Total Protein 7.1 6.0 - 8.5 g/dL    Albumin 3.9 3.5 - 5.2 g/dL    ALT (SGPT) 9 1 - 41 U/L    AST (SGOT) 8 1 - 40 U/L    Alkaline Phosphatase 122 (H) 39 - 117 U/L    Total Bilirubin 0.3 0.0 - 1.2 mg/dL    Globulin 3.2 gm/dL    A/G Ratio 1.2 g/dL    BUN/Creatinine Ratio 16.5 7.0 - 25.0    Anion Gap 9.0 5.0 - 15.0 mmol/L    eGFR 60.0 (L) >60.0 mL/min/1.73   Magnesium    Collection Time: 10/11/24 11:00 PM    Specimen: Blood   Result Value Ref Range    Magnesium 2.4 1.6 - 2.4 mg/dL   CBC Auto Differential    Collection Time: 10/11/24 11:00 PM    Specimen: Blood   Result Value Ref Range    WBC 9.99 3.40 - 10.80 10*3/mm3    RBC 4.51 4.14 - 5.80 10*6/mm3    Hemoglobin 13.1 13.0 - 17.7 g/dL    Hematocrit 39.3 37.5 - 51.0 %    MCV 87.1 79.0 - 97.0 fL    MCH 29.0 26.6 - 33.0 pg    MCHC 33.3 31.5 - 35.7 g/dL    RDW 14.7 12.3 - 15.4 %    RDW-SD 46.4 37.0 - 54.0 fl    MPV 9.7 6.0 - 12.0 fL    Platelets 237 140 - 450 10*3/mm3    Neutrophil % 57.1 42.7 - 76.0 %    Lymphocyte % 28.9 19.6 - 45.3 %    Monocyte % 11.9 5.0 - 12.0 %    Eosinophil % 1.2 0.3 - 6.2 %    Basophil % 0.4 0.0 - 1.5 %    Immature Grans % 0.5 0.0 - 0.5 %    Neutrophils, Absolute 5.70 1.70 -  7.00 10*3/mm3    Lymphocytes, Absolute 2.89 0.70 - 3.10 10*3/mm3    Monocytes, Absolute 1.19 (H) 0.10 - 0.90 10*3/mm3    Eosinophils, Absolute 0.12 0.00 - 0.40 10*3/mm3    Basophils, Absolute 0.04 0.00 - 0.20 10*3/mm3    Immature Grans, Absolute 0.05 0.00 - 0.05 10*3/mm3    nRBC 0.0 0.0 - 0.2 /100 WBC   Beta Hydroxybutyrate Quantitative    Collection Time: 10/11/24 11:00 PM    Specimen: Blood   Result Value Ref Range    Beta-Hydroxybutyrate Quant 0.102 0.020 - 0.270 mmol/L   Urinalysis With Microscopic If Indicated (No Culture) - Indwelling Urethral Catheter    Collection Time: 10/12/24 12:15 AM    Specimen: Indwelling Urethral Catheter; Urine   Result Value Ref Range    Color, UA Yellow Yellow, Straw    Appearance, UA Turbid (A) Clear    pH, UA 5.5 5.0 - 8.0    Specific Gravity, UA 1.017 1.005 - 1.030    Glucose, UA Negative Negative    Ketones, UA Trace (A) Negative    Bilirubin, UA Negative Negative    Blood, UA Moderate (2+) (A) Negative    Protein,  mg/dL (2+) (A) Negative    Leuk Esterase, UA Large (3+) (A) Negative    Nitrite, UA Positive (A) Negative    Urobilinogen, UA 1.0 E.U./dL 0.2 - 1.0 E.U./dL   Urinalysis, Microscopic Only - Indwelling Urethral Catheter    Collection Time: 10/12/24 12:15 AM    Specimen: Indwelling Urethral Catheter; Urine   Result Value Ref Range    RBC, UA 11-20 (A) None Seen, 0-2 /HPF    WBC, UA Too Numerous to Count (A) None Seen, 0-2 /HPF    Bacteria, UA 4+ (A) None Seen /HPF    Squamous Epithelial Cells, UA 7-12 (A) None Seen, 0-2 /HPF    Hyaline Casts, UA 13-20 None Seen /LPF    Methodology Automated Microscopy        Ordered the above labs and independently interpreted results. My findings will be discussed in the medical decision making section below        RADIOLOGY  XR Chest 1 View    Result Date: 10/11/2024  SINGLE VIEW OF THE CHEST  HISTORY: Hyperglycemia  COMPARISON: None available.  FINDINGS: There is cardiomegaly. There is no vascular congestion. No pneumothorax  or pleural effusion is seen. No acute infiltrates are identified. There are advanced degenerative changes of the right shoulder.      No acute findings.  This report was finalized on 10/11/2024 10:47 PM by Dr. Marlene Krishnan M.D on Workstation: [id number]       Ordered the above noted radiological studies.  Independently interpreted by me and my independent review of findings can be found in the ED Course.  See dictation for official radiology interpretation.      PROCEDURES    Procedures        MEDICATIONS GIVEN IN ER    Medications   cefTRIAXone (ROCEPHIN) 2,000 mg in sodium chloride 0.9 % 100 mL MBP (has no administration in time range)         PROGRESS, DATA ANALYSIS, CONSULTS, AND MEDICAL DECISION MAKING    Please note that this section constitutes my independent interpretation of clinical data including lab results, radiology, EKG's.  This constitutes my independent professional opinion regarding differential diagnosis and management of this patient.  It may include any factors such as history from outside sources, review of external records, social determinants of health, management of medications, response to those treatments, and discussions with other providers.    Differential Diagnosis and Plan: Initial concern for DKA, sepsis, urinary tract infection, renal failure, electrolyte abnormalities, pneumonia, among others.  Plan for labs, supportive care, reevaluation with results.    Additional sources:  - Discussed/ obtained information from independent historians: EMS reported improvement in blood glucose down to 154 prior to arrival     - (Social Determinants of Health): None     - Shared decision making:      ED Course as of 10/12/24 0220   Fri Oct 11, 2024   2218 XR Chest 1 View  Per my independent interpretation of the chest x-ray, no evidence of pneumothorax or obvious pneumonia [DC]   2332 WBC: 9.99 [DC]   2332 Hemoglobin: 13.1 [DC]   2332 Platelets: 237 [DC]   2350 Magnesium: 2.4 [DC]   2350  Beta-Hydroxybutyrate Quant: 0.102 [DC]   2350 Glucose(!): 126 [DC]   2350 BUN: 21 [DC]   2350 Creatinine: 1.27 [DC]   2350 Sodium: 142 [DC]   2350 Potassium: 4.1 [DC]   2350 ALT (SGPT): 9 [DC]   2350 AST (SGOT): 8 [DC]   2350 Alkaline Phosphatase(!): 122 [DC]   2350 Total Bilirubin: 0.3 [DC]   Sat Oct 12, 2024   0056 Nitrite, UA(!): Positive [DC]   0056 Leukocytes, UA(!): Large (3+) [DC]   0056 Bacteria, UA(!): 4+ [DC]   0056 WBC, UA(!): Too Numerous to Count [DC]   0056 RBC, UA(!): 11-20 [DC]   0219 Discussed with HENNA Piña, LHA, discussed patient's clinical course and findings today, treatment modalities, and need for hospitalization. [DC]      ED Course User Index  [DC] Himanshu Rey MD     Hemodynamically controlled, findings of urinary tract infection, will plan for antibiotics and further monitoring given the encephalopathy and hyperglycemic issues earlier today.    Hospitalization Considered?: yes    Orders Placed During This Visit:  Orders Placed This Encounter   Procedures    XR Chest 1 View    Comprehensive Metabolic Panel    Urinalysis With Microscopic If Indicated (No Culture) - Urine, Catheter    Magnesium    CBC Auto Differential    Beta Hydroxybutyrate Quantitative    Urinalysis, Microscopic Only - Urine, Clean Catch    Urinalysis With Culture If Indicated - Indwelling Urethral Catheter    LHA (on-call MD unless specified) Details    Initiate Observation Status    CBC & Differential    Ketone Bodies, Serum (Not performed at [place])       Additional orders considered but not placed:      Independent interpretation of labs, radiology studies, and discussions with consultants: See ED Course        AS OF 02:20 EDT VITALS:    BP - 122/77  HR - 74  TEMP - 98.8 °F (37.1 °C) (Tympanic)  02 SATS - 97%          DIAGNOSIS  Final diagnoses:   Urinary tract infection in male   Acute encephalopathy   History of dementia         DISPOSITION  ED Disposition       ED Disposition   Decision to Admit     Condition   --    Comment   Level of Care: Med/Surg [1]   Diagnosis: Urinary tract infection in male [[id number]]   Admitting Physician: KARSTEN POON [[id number]]   Attending Physician: KARSTEN POON [[id number]]   Is patient appropriate for Inpatient Observation Unit?: No [0]                  Please note that portions of this document were completed with a voice recognition program.    Note Disclaimer: At [facility], we believe that sharing information builds trust and better relationships. You are receiving this note because you recently visited [facility]. It is possible you will see health information before a provider has talked with you about it. This kind of information can be easy to misunderstand. To help you fully understand what it means for your health, we urge you to discuss this note with your provider.                       Himanshu Rey MD  10/12/24 0220

## 2024-10-12 NOTE — PLAN OF CARE
Goal Outcome Evaluation:               Patient is oriented to self only.  He has only awakened to eat and immediately goes back to sleep.    Patient has not indicated pain at any time during the shift.    TM

## 2024-10-12 NOTE — H&P
Patient Name:  Singh Hatch  YOB: 1950  MRN:  [id number]  Admit Date:  10/11/2024  Patient Care Team:  Kimberly Tovar DO as PCP - General (Physical Medicine and Rehabilitation)      Subjective   History Present Illness     Chief Complaint   Patient presents with    Altered Mental Status    Hyperglycemia         History of Present Illness  This is a 74-year-old male with history of dementia, Parkinson's disease, presents to the hospital, from nursing home, was found to have significant hyperglycemia, and was briefly unresponsive.  His blood glucose level was found to be above 350.  Patient has indwelling Holland catheter, and suspicion was that patient has current UTI, was given Rocephin in the emergency room, will be admitted to hospitalist service for further workup of symptoms.  Patient denies fever, chills.    Review of Systems   Review of Systems   Constitutional: Negative for activity change and appetite change.   HENT: Negative for congestion and dental problem.    Eyes: Negative for discharge and itching.   Respiratory: Negative for apnea and chest tightness.    Cardiovascular: Negative for chest pain and palpitations.   Gastrointestinal: Negative for abdominal distention and abdominal pain.   Endocrine: Negative for cold intolerance and heat intolerance.   Genitourinary: Negative for difficulty urinating and frequency.   Musculoskeletal: Negative for arthralgias and back pain.   Skin: Negative for color change and pallor.   Allergic/Immunologic: Negative for environmental allergies and food allergies.   Neurological: Negative for dizziness and facial asymmetry.   Hematological: Negative for adenopathy. Does not bruise/bleed easily.   Psychiatric/Behavioral: Negative for agitation and suicidal ideas.       Personal History     History reviewed. No pertinent past medical history.  History reviewed. No pertinent surgical history.  History reviewed. No pertinent family history.  Social  History     Tobacco Use    Smoking status: Never     Passive exposure: Never    Smokeless tobacco: Never   Vaping Use    Vaping status: Never Used   Substance Use Topics    Alcohol use: Never    Drug use: Not Currently     No current facility-administered medications on file prior to encounter.     No current outpatient medications on file prior to encounter.     No Known Allergies    Objective    Objective     Vital Signs  Temp:  [97.9 °F (36.6 °C)-98.8 °F (37.1 °C)] 97.9 °F (36.6 °C)  Heart Rate:  [68-90] 74  Resp:  [16] 16  BP: (119-136)/(67-77) 119/74  SpO2:  [95 %-99 %] 97 %  on  Flow (L/min) (Oxygen Therapy):  [2] 2;   Device (Oxygen Therapy): room air  Body mass index is 22.81 kg/m².    Physical Exam  General, awake and alert.  Head and ENT, normocephalic and atraumatic.  Lungs, symmetric expansion, equal air entry bilaterally.  Heart, regular rate and rhythm.  Abdomen, soft and nontender.  Extremities, no clubbing or cyanosis.  Neuro, no focal deficits.  Skin: Warm and no rash.  Psych, normal mood and affect.  Musculoskeletal, joint examination is grossly normal.    Results Review:  I reviewed the patient's new clinical results.  I reviewed the patient's new imaging results and agree with the interpretation.  I reviewed the patient's other test results and agree with the interpretation  I personally viewed and interpreted the patient's EKG/Telemetry data  Discussed with ED provider.    Lab Results (last 24 hours)       Procedure Component Value Units Date/Time    CBC & Differential [[id number]]  (Abnormal) Collected: 10/11/24 2300    Specimen: Blood Updated: 10/11/24 2318    Narrative:      The following orders were created for panel order CBC & Differential.  Procedure                               Abnormality         Status                     ---------                               -----------         ------                     CBC Auto Differential[[id number]]        Abnormal            Final result                  Please view results for these tests on the individual orders.    Comprehensive Metabolic Panel [[id number]]  (Abnormal) Collected: 10/11/24 2300    Specimen: Blood Updated: 10/11/24 2342     Glucose 126 mg/dL      BUN 21 mg/dL      Creatinine 1.27 mg/dL      Sodium 142 mmol/L      Potassium 4.1 mmol/L      Chloride 106 mmol/L      CO2 27.0 mmol/L      Calcium 9.7 mg/dL      Total Protein 7.1 g/dL      Albumin 3.9 g/dL      ALT (SGPT) 9 U/L      AST (SGOT) 8 U/L      Alkaline Phosphatase 122 U/L      Total Bilirubin 0.3 mg/dL      Globulin 3.2 gm/dL      A/G Ratio 1.2 g/dL      BUN/Creatinine Ratio 16.5     Anion Gap 9.0 mmol/L      eGFR 60.0 mL/min/1.73     Narrative:      GFR Normal >60  Chronic Kidney Disease <60  Kidney Failure <15    The GFR formula is only valid for adults with stable renal function between ages 18 and 70.    Ketone Bodies, Serum (Not performed at [place]) [[id number]]  (Normal) Collected: 10/11/24 2300    Specimen: Blood Updated: 10/11/24 2342    Narrative:      The following orders were created for panel order Ketone Bodies, Serum (Not performed at [place]).  Procedure                               Abnormality         Status                     ---------                               -----------         ------                     Beta Hydroxybutyrate Dick...[[id number]]  Normal              Final result                 Please view results for these tests on the individual orders.    Magnesium [[id number]]  (Normal) Collected: 10/11/24 2300    Specimen: Blood Updated: 10/11/24 2342     Magnesium 2.4 mg/dL     CBC Auto Differential [[id number]]  (Abnormal) Collected: 10/11/24 2300    Specimen: Blood Updated: 10/11/24 2318     WBC 9.99 10*3/mm3      RBC 4.51 10*6/mm3      Hemoglobin 13.1 g/dL      Hematocrit 39.3 %      MCV 87.1 fL      MCH 29.0 pg      MCHC 33.3 g/dL      RDW 14.7 %      RDW-SD 46.4 fl      MPV 9.7 fL      Platelets 237 10*3/mm3      Neutrophil % 57.1 %      Lymphocyte %  28.9 %      Monocyte % 11.9 %      Eosinophil % 1.2 %      Basophil % 0.4 %      Immature Grans % 0.5 %      Neutrophils, Absolute 5.70 10*3/mm3      Lymphocytes, Absolute 2.89 10*3/mm3      Monocytes, Absolute 1.19 10*3/mm3      Eosinophils, Absolute 0.12 10*3/mm3      Basophils, Absolute 0.04 10*3/mm3      Immature Grans, Absolute 0.05 10*3/mm3      nRBC 0.0 /100 WBC     Beta Hydroxybutyrate Quantitative [[id number]]  (Normal) Collected: 10/11/24 2300    Specimen: Blood Updated: 10/11/24 2342     Beta-Hydroxybutyrate Quant 0.102 mmol/L     Narrative:      In the assessment of possible diabetic ketoacidosis, the test should be interpreted along with other clinical and laboratory findings.  A level greater than 1 mmol/L should require further evaluation and levels of more than 3 mmol/L require immediate medical review.    Urinalysis With Microscopic If Indicated (No Culture) - Indwelling Urethral Catheter [[id number]]  (Abnormal) Collected: 10/12/24 0015    Specimen: Urine from Indwelling Urethral Catheter Updated: 10/12/24 0045     Color, UA Yellow     Appearance, UA Turbid     pH, UA 5.5     Specific Gravity, UA 1.017     Glucose, UA Negative     Ketones, UA Trace     Bilirubin, UA Negative     Blood, UA Moderate (2+)     Protein,  mg/dL (2+)     Leuk Esterase, UA Large (3+)     Nitrite, UA Positive     Urobilinogen, UA 1.0 E.U./dL    Urinalysis, Microscopic Only - Indwelling Urethral Catheter [[id number]]  (Abnormal) Collected: 10/12/24 0015    Specimen: Urine from Indwelling Urethral Catheter Updated: 10/12/24 0046     RBC, UA 11-20 /HPF      WBC, UA Too Numerous to Count /HPF      Bacteria, UA 4+ /HPF      Squamous Epithelial Cells, UA 7-12 /HPF      Hyaline Casts, UA 13-20 /LPF      Methodology Automated Microscopy    Urine Culture - Urine, Indwelling Urethral Catheter [[id number]] Collected: 10/12/24 0015    Specimen: Urine from Indwelling Urethral Catheter Updated: 10/12/24 0948    POC Glucose Once  [[id number]]  (Normal) Collected: 10/12/24 0429    Specimen: Blood Updated: 10/12/24 0430     Glucose 91 mg/dL     CBC (No Diff) [[id number]]  (Abnormal) Collected: 10/12/24 0640    Specimen: Blood Updated: 10/12/24 0659     WBC 11.24 10*3/mm3      RBC 4.27 10*6/mm3      Hemoglobin 12.2 g/dL      Hematocrit 37.9 %      MCV 88.8 fL      MCH 28.6 pg      MCHC 32.2 g/dL      RDW 15.0 %      RDW-SD 48.6 fl      MPV 9.5 fL      Platelets 214 10*3/mm3     Comprehensive Metabolic Panel [[id number]]  (Abnormal) Collected: 10/12/24 0640    Specimen: Blood Updated: 10/12/24 0725     Glucose 78 mg/dL      BUN 19 mg/dL      Creatinine 1.25 mg/dL      Sodium 145 mmol/L      Potassium 4.1 mmol/L      Chloride 109 mmol/L      CO2 27.1 mmol/L      Calcium 8.7 mg/dL      Total Protein 6.5 g/dL      Albumin 3.5 g/dL      ALT (SGPT) 12 U/L      AST (SGOT) 14 U/L      Alkaline Phosphatase 107 U/L      Total Bilirubin 0.2 mg/dL      Globulin 3.0 gm/dL      A/G Ratio 1.2 g/dL      BUN/Creatinine Ratio 15.2     Anion Gap 8.9 mmol/L      eGFR 61.2 mL/min/1.73     Narrative:      GFR Normal >60  Chronic Kidney Disease <60  Kidney Failure <15    The GFR formula is only valid for adults with stable renal function between ages 18 and 70.            Imaging Results (Last 24 Hours)       Procedure Component Value Units Date/Time    XR Chest 1 View [[id number]] Collected: 10/11/24 2246     Updated: 10/11/24 2250    Narrative:      SINGLE VIEW OF THE CHEST     HISTORY: Hyperglycemia     COMPARISON: None available.     FINDINGS:  There is cardiomegaly. There is no vascular congestion. No pneumothorax  or pleural effusion is seen. No acute infiltrates are identified. There  are advanced degenerative changes of the right shoulder.       Impression:      No acute findings.     This report was finalized on 10/11/2024 10:47 PM by Dr. Marlene Krishnan M.D on Workstation: [id number]                   No orders to display        Assessment/Plan     Active  Hospital Problems    Diagnosis  POA    **Urinary tract infection in male [N39.0]  Yes    Chronic indwelling Holland catheter [Z97.8]  Not Applicable    Dementia [F03.90]  Yes    Parkinson disease [G20.A1]  Yes      Resolved Hospital Problems   No resolved problems to display.     Assessment and plan  1.  Acute encephalopathy due to underlying UTI, patient received Rocephin in the emergency room.  Follow urine cultures, infectious disease consultation.    2.  Dementia, Parkinson's disease, chronic condition, continue home medications, supportive management.    3.  CODE STATUS is full code.  Further plans based on hospital course.       Phong West MD  [facility]  10/12/24  15:33 EDT

## 2024-10-12 NOTE — SIGNIFICANT NOTE
10/12/24 1125   OTHER   Discipline physical therapist   Rehab Time/Intention   Session Not Performed other (see comments)  (Nsg reports he is on bedrest orders, attempting to get ahold of MD to transfer floors. Will f/u tomorrow if appropriate.)   Therapy Assessment/Plan (PT)   Criteria for Skilled Interventions Met (PT) yes   Recommendation   PT - Next Appointment 10/13/24

## 2024-10-12 NOTE — PLAN OF CARE
Goal Outcome Evaluation:      Patient arrived to 3P from ED via stretcher by transport tech at 345am. Patient alert and oriented x 2 (self and place), and reportedly admitted for UTI. Patient reportedly arrived from [facility] to [facility] ED following an unresponsive episode as well as a hyperglycemic episode of 358. Pt is DM II, blood glucose 91 upon arrival to 3P. Diabetic education consult initiated. Patient is on room air, denies pain. Patient is a poor historian and unable to give last bowel movement date. Pt has 20g in left forearm (saline locked), U/A positive. Rocephin 2g dosed in ED. Chest xray negative. Pt reportedly d/c from [place] on 08/23/24 following severe sepsis w/UTI. Generalized bruising noted to B arms and discoloration spots noted to back. Redness noted to B knees however, right knee has red, dry, scabbed spots. Right small open wound to buttock, wound care consult initiated and area covered with mepilex. Red circles to chest where r/t electrode placement. Holland cath in place w/sediment noted inside bag and tubing.  consult initiated as patient will need transportation back to [facility] where patient reports he resides. Patient reports using a power chair for daily mobility purposes however, pt reports he can stand and pivot. Pt advised to call for assistance with needs until PT/OT has evaluated him therefore, bed alarm is on. Pt reports wearing glasses however, this nurse did not see them. Pt denies difficulty eating and swallowing. Pt unsure if he has received flu injection this season. Due to patient a/o x 2 there were multiple sections of admission which could not be completed at this time. Pt unsure of recent weight loss and is unable to verify current medications. Plan of care is ongoing.

## 2024-10-13 PROBLEM — E11.65 TYPE 2 DIABETES MELLITUS WITH HYPERGLYCEMIA, WITH LONG-TERM CURRENT USE OF INSULIN: Status: ACTIVE | Noted: 2024-10-13

## 2024-10-13 PROBLEM — G93.41 ACUTE METABOLIC ENCEPHALOPATHY: Status: ACTIVE | Noted: 2024-10-13

## 2024-10-13 PROBLEM — Z79.4 TYPE 2 DIABETES MELLITUS WITH HYPERGLYCEMIA, WITH LONG-TERM CURRENT USE OF INSULIN: Status: ACTIVE | Noted: 2024-10-13

## 2024-10-13 LAB
ALBUMIN SERPL-MCNC: 3.6 G/DL (ref 3.5–5.2)
ALBUMIN/GLOB SERPL: 1.2 G/DL
ALP SERPL-CCNC: 116 U/L (ref 39–117)
ALT SERPL W P-5'-P-CCNC: 13 U/L (ref 1–41)
ANION GAP SERPL CALCULATED.3IONS-SCNC: 9 MMOL/L (ref 5–15)
AST SERPL-CCNC: 10 U/L (ref 1–40)
BASOPHILS # BLD AUTO: 0.04 10*3/MM3 (ref 0–0.2)
BASOPHILS NFR BLD AUTO: 0.4 % (ref 0–1.5)
BILIRUB SERPL-MCNC: 0.3 MG/DL (ref 0–1.2)
BUN SERPL-MCNC: 25 MG/DL (ref 8–23)
BUN/CREAT SERPL: 18.7 (ref 7–25)
CALCIUM SPEC-SCNC: 9.3 MG/DL (ref 8.6–10.5)
CHLORIDE SERPL-SCNC: 108 MMOL/L (ref 98–107)
CO2 SERPL-SCNC: 27 MMOL/L (ref 22–29)
CREAT SERPL-MCNC: 1.34 MG/DL (ref 0.76–1.27)
DEPRECATED RDW RBC AUTO: 49.5 FL (ref 37–54)
EGFRCR SERPLBLD CKD-EPI 2021: 56.3 ML/MIN/1.73
EOSINOPHIL # BLD AUTO: 0.14 10*3/MM3 (ref 0–0.4)
EOSINOPHIL NFR BLD AUTO: 1.6 % (ref 0.3–6.2)
ERYTHROCYTE [DISTWIDTH] IN BLOOD BY AUTOMATED COUNT: 15 % (ref 12.3–15.4)
GLOBULIN UR ELPH-MCNC: 3.1 GM/DL
GLUCOSE BLDC GLUCOMTR-MCNC: 121 MG/DL (ref 70–130)
GLUCOSE BLDC GLUCOMTR-MCNC: 232 MG/DL (ref 70–130)
GLUCOSE BLDC GLUCOMTR-MCNC: 260 MG/DL (ref 70–130)
GLUCOSE SERPL-MCNC: 149 MG/DL (ref 65–99)
HBA1C MFR BLD: 8.1 % (ref 4.8–5.6)
HCT VFR BLD AUTO: 38.8 % (ref 37.5–51)
HGB BLD-MCNC: 12.5 G/DL (ref 13–17.7)
IMM GRANULOCYTES # BLD AUTO: 0.03 10*3/MM3 (ref 0–0.05)
IMM GRANULOCYTES NFR BLD AUTO: 0.3 % (ref 0–0.5)
LYMPHOCYTES # BLD AUTO: 2.46 10*3/MM3 (ref 0.7–3.1)
LYMPHOCYTES NFR BLD AUTO: 27.3 % (ref 19.6–45.3)
MCH RBC QN AUTO: 29 PG (ref 26.6–33)
MCHC RBC AUTO-ENTMCNC: 32.2 G/DL (ref 31.5–35.7)
MCV RBC AUTO: 90 FL (ref 79–97)
MONOCYTES # BLD AUTO: 1.03 10*3/MM3 (ref 0.1–0.9)
MONOCYTES NFR BLD AUTO: 11.4 % (ref 5–12)
NEUTROPHILS NFR BLD AUTO: 5.3 10*3/MM3 (ref 1.7–7)
NEUTROPHILS NFR BLD AUTO: 59 % (ref 42.7–76)
NRBC BLD AUTO-RTO: 0 /100 WBC (ref 0–0.2)
PLATELET # BLD AUTO: 205 10*3/MM3 (ref 140–450)
PMV BLD AUTO: 9.7 FL (ref 6–12)
POTASSIUM SERPL-SCNC: 4.6 MMOL/L (ref 3.5–5.2)
PROT SERPL-MCNC: 6.7 G/DL (ref 6–8.5)
RBC # BLD AUTO: 4.31 10*6/MM3 (ref 4.14–5.8)
SODIUM SERPL-SCNC: 144 MMOL/L (ref 136–145)
WBC NRBC COR # BLD AUTO: 9 10*3/MM3 (ref 3.4–10.8)

## 2024-10-13 PROCEDURE — 63710000001 INSULIN LISPRO (HUMAN) PER 5 UNITS: Performed by: HOSPITALIST

## 2024-10-13 PROCEDURE — 80053 COMPREHEN METABOLIC PANEL: CPT | Performed by: INTERNAL MEDICINE

## 2024-10-13 PROCEDURE — 97166 OT EVAL MOD COMPLEX 45 MIN: CPT

## 2024-10-13 PROCEDURE — 82948 REAGENT STRIP/BLOOD GLUCOSE: CPT

## 2024-10-13 PROCEDURE — 83036 HEMOGLOBIN GLYCOSYLATED A1C: CPT | Performed by: HOSPITALIST

## 2024-10-13 PROCEDURE — 85025 COMPLETE CBC W/AUTO DIFF WBC: CPT | Performed by: INTERNAL MEDICINE

## 2024-10-13 PROCEDURE — 97535 SELF CARE MNGMENT TRAINING: CPT

## 2024-10-13 PROCEDURE — 99223 1ST HOSP IP/OBS HIGH 75: CPT | Performed by: INTERNAL MEDICINE

## 2024-10-13 PROCEDURE — 97162 PT EVAL MOD COMPLEX 30 MIN: CPT

## 2024-10-13 RX ORDER — NICOTINE POLACRILEX 4 MG
15 LOZENGE BUCCAL
COMMUNITY

## 2024-10-13 RX ORDER — ERGOCALCIFEROL 1.25 MG/1
50000 CAPSULE, LIQUID FILLED ORAL WEEKLY
COMMUNITY

## 2024-10-13 RX ORDER — MICONAZOLE NITRATE 20 MG/G
1 CREAM TOPICAL 2 TIMES DAILY
COMMUNITY

## 2024-10-13 RX ORDER — MIRTAZAPINE 15 MG/1
15 TABLET, ORALLY DISINTEGRATING ORAL NIGHTLY
Status: DISCONTINUED | OUTPATIENT
Start: 2024-10-13 | End: 2024-10-14 | Stop reason: HOSPADM

## 2024-10-13 RX ORDER — INSULIN LISPRO 100 [IU]/ML
2-10 INJECTION, SOLUTION INTRAVENOUS; SUBCUTANEOUS
COMMUNITY

## 2024-10-13 RX ORDER — ASPIRIN 81 MG/1
81 TABLET ORAL DAILY
Status: DISCONTINUED | OUTPATIENT
Start: 2024-10-13 | End: 2024-10-14 | Stop reason: HOSPADM

## 2024-10-13 RX ORDER — CARBIDOPA/LEVODOPA 25MG-250MG
1.5 TABLET ORAL 4 TIMES DAILY
COMMUNITY

## 2024-10-13 RX ORDER — NICOTINE POLACRILEX 4 MG
15 LOZENGE BUCCAL
Status: DISCONTINUED | OUTPATIENT
Start: 2024-10-13 | End: 2024-10-14 | Stop reason: HOSPADM

## 2024-10-13 RX ORDER — UREA 10 %
1000 LOTION (ML) TOPICAL DAILY
Status: DISCONTINUED | OUTPATIENT
Start: 2024-10-13 | End: 2024-10-14 | Stop reason: HOSPADM

## 2024-10-13 RX ORDER — CARBIDOPA AND LEVODOPA 50; 200 MG/1; MG/1
1 TABLET, EXTENDED RELEASE ORAL NIGHTLY
COMMUNITY

## 2024-10-13 RX ORDER — MIRTAZAPINE 15 MG/1
15 TABLET, ORALLY DISINTEGRATING ORAL NIGHTLY
COMMUNITY

## 2024-10-13 RX ORDER — ASPIRIN 81 MG/1
81 TABLET ORAL DAILY
COMMUNITY

## 2024-10-13 RX ORDER — LANOLIN ALCOHOL/MO/W.PET/CERES
1000 CREAM (GRAM) TOPICAL DAILY
COMMUNITY

## 2024-10-13 RX ORDER — DEXTROSE MONOHYDRATE 25 G/50ML
25 INJECTION, SOLUTION INTRAVENOUS
Status: DISCONTINUED | OUTPATIENT
Start: 2024-10-13 | End: 2024-10-14 | Stop reason: HOSPADM

## 2024-10-13 RX ORDER — INSULIN LISPRO 100 [IU]/ML
2-9 INJECTION, SOLUTION INTRAVENOUS; SUBCUTANEOUS
Status: DISCONTINUED | OUTPATIENT
Start: 2024-10-13 | End: 2024-10-14 | Stop reason: HOSPADM

## 2024-10-13 RX ORDER — ERGOCALCIFEROL 1.25 MG/1
50000 CAPSULE, LIQUID FILLED ORAL WEEKLY
Status: DISCONTINUED | OUTPATIENT
Start: 2024-10-16 | End: 2024-10-14 | Stop reason: HOSPADM

## 2024-10-13 RX ORDER — CARBIDOPA/LEVODOPA 25MG-250MG
1.5 TABLET ORAL 4 TIMES DAILY
Status: DISCONTINUED | OUTPATIENT
Start: 2024-10-13 | End: 2024-10-14 | Stop reason: HOSPADM

## 2024-10-13 RX ORDER — IBUPROFEN 600 MG/1
1 TABLET ORAL
Status: DISCONTINUED | OUTPATIENT
Start: 2024-10-13 | End: 2024-10-14 | Stop reason: HOSPADM

## 2024-10-13 RX ORDER — CARBIDOPA AND LEVODOPA 50; 200 MG/1; MG/1
1 TABLET, EXTENDED RELEASE ORAL NIGHTLY
Status: DISCONTINUED | OUTPATIENT
Start: 2024-10-13 | End: 2024-10-14 | Stop reason: HOSPADM

## 2024-10-13 RX ADMIN — INSULIN LISPRO 4 UNITS: 100 INJECTION, SOLUTION INTRAVENOUS; SUBCUTANEOUS at 21:05

## 2024-10-13 RX ADMIN — CARBIDOPA AND LEVODOPA 1 TABLET: 50; 200 TABLET, EXTENDED RELEASE ORAL at 21:05

## 2024-10-13 RX ADMIN — Medication 1000 MCG: at 10:30

## 2024-10-13 RX ADMIN — ASPIRIN 81 MG: 81 TABLET, COATED ORAL at 10:30

## 2024-10-13 RX ADMIN — INSULIN LISPRO 4 UNITS: 100 INJECTION, SOLUTION INTRAVENOUS; SUBCUTANEOUS at 12:25

## 2024-10-13 RX ADMIN — MIRTAZAPINE 15 MG: 15 TABLET, ORALLY DISINTEGRATING ORAL at 21:05

## 2024-10-13 RX ADMIN — CARBIDOPA AND LEVODOPA 1.5 TABLET: 25; 250 TABLET ORAL at 17:35

## 2024-10-13 RX ADMIN — CARBIDOPA AND LEVODOPA 1.5 TABLET: 25; 250 TABLET ORAL at 21:05

## 2024-10-13 RX ADMIN — CARBIDOPA AND LEVODOPA 1.5 TABLET: 25; 250 TABLET ORAL at 12:24

## 2024-10-13 NOTE — THERAPY EVALUATION
Patient Name: Singh Hatch  : 1950    MRN: [id number]                              Today's Date: 10/13/2024       Admit Date: 10/11/2024    Visit Dx:     ICD-10-CM ICD-9-CM   1. Urinary tract infection in male  N39.0 599.0   2. Acute encephalopathy  G93.40 348.30   3. History of dementia  Z86.59 V11.8     Patient Active Problem List   Diagnosis    Urinary tract infection in male    Chronic indwelling Holland catheter    Dementia    Parkinson disease     Past Medical History:   Diagnosis Date    Age-related osteoporosis without current pathological fracture     Atherosclerotic heart disease of native coronary artery without angina pectoris     Essential (primary) hypertension     Irritant contact dermatitis due to fecal, urinary or dual incontinence     Long term (current) use of insulin     Mixed hyperlipidemia     Other cervical disc degeneration, unspecified cervical region     Other intervertebral disc degeneration, 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     Other obstructive and reflux uropathy     Other specified anxiety disorders     Parkinson's disease without dyskinesia, without mention of fluctuations     Personal history of malignant neoplasm of prostate     Type 2 diabetes mellitus with hyperglycemia     Unspecified dementia, unspecified severity, without behavioral disturbance, psychotic disturbance, mood disturbance, and anxiety     Vitamin D deficiency, unspecified      History reviewed. No pertinent surgical history.   General Information       Row Name 10/13/24 1038          OT Time and Intention    Document Type evaluation  -AG     Mode of Treatment individual therapy;occupational therapy  -AG       Row Name 10/13/24 1038          General Information    Patient Profile Reviewed yes  -AG     Existing Precautions/Restrictions fall  -AG       Row Name 10/13/24 1038          Living Environment    People in Home facility resident  -AG       Row Name 10/13/24 1038          Home Main Entrance     Number of Stairs, Main Entrance none  -AG       Row Name 10/13/24 1038          Cognition    Orientation Status (Cognition) oriented x 3  -AG       Row Name 10/13/24 1038          Safety Issues/Impairments Affecting Functional Mobility    Safety Issues Affecting Function (Mobility) safety precaution awareness  -AG     Impairments Affecting Function (Mobility) balance;endurance/activity tolerance;strength  -AG               User Key  (r) = Recorded By, (t) = Taken By, (c) = Cosigned By      Initials Name Provider Type    AG Cheko Whittaker OT Occupational Therapist                     Mobility/ADL's       Row Name 10/13/24 1038          Bed Mobility    Bed Mobility supine-sit  -AG     Supine-Sit Hawkins (Bed Mobility) maximum assist (25% patient effort)  -AG     Assistive Device (Bed Mobility) bed rails;head of bed elevated  -AG       Row Name 10/13/24 1038          Transfers    Transfers bed-chair transfer;sit-stand transfer  -AG       Row Name 10/13/24 1038          Bed-Chair Transfer    Bed-Chair Hawkins (Transfers) minimum assist (75% patient effort);1 person assist  -AG     Comment, (Bed-Chair Transfer) HHAx1  -AG       Row Name 10/13/24 1038          Sit-Stand Transfer    Sit-Stand Hawkins (Transfers) contact guard;minimum assist (75% patient effort);1 person assist  -AG     Comment, (Sit-Stand Transfer) HHA  -AG       Row Name 10/13/24 1038          Functional Mobility    Functional Mobility- Comment bed > recliner w HHA  -AG       Row Name 10/13/24 1038          Activities of Daily Living    BADL Assessment/Intervention lower body dressing;grooming;toileting  -AG       Row Name 10/13/24 1038          Lower Body Dressing Assessment/Training    Hawkins Level (Lower Body Dressing) lower body dressing skills;don;socks;moderate assist (50% patient effort)  -AG     Position (Lower Body Dressing) edge of bed sitting  -AG       Row Name 10/13/24 1038          Grooming Assessment/Training     [place] Level (Grooming) grooming skills;wash face, hands;set up  -AG       Row Name 10/13/24 1038          Toileting Assessment/Training    [place] Level (Toileting) dependent (less than 25% patient effort)  -AG     Comment, (Toileting) payan cath  -AG               User Key  (r) = Recorded By, (t) = Taken By, (c) = Cosigned By      Initials Name Provider Type    AG Cheko Whittaker OT Occupational Therapist                   Obj/Interventions       Row Name 10/13/24 1049          Sensory Assessment (Somatosensory)    Sensory Assessment (Somatosensory) sensation intact  -AG       Row Name 10/13/24 1049          Vision Assessment/Intervention    Visual Impairment/Limitations WFL  -AG       [facility] Name 10/13/24 1049          Range of Motion Comprehensive    General Range of Motion no range of motion deficits identified  -AG       Row Name 10/13/24 1049          Strength Comprehensive (MMT)    Comment, General Manual Muscle Testing (MMT) Assessment generalized weakness  -AG       Row Name 10/13/24 1049          Motor Skills    Motor Skills functional endurance  -AG     Functional Endurance fair-  -AG       Row Name 10/13/24 1049          Balance    Balance Assessment sitting static balance;sitting dynamic balance;standing static balance;standing dynamic balance  -AG     Static Sitting Balance standby assist  -AG     Dynamic Sitting Balance standby assist  -AG     Position, Sitting Balance unsupported  -AG     Static Standing Balance contact guard  -AG     Dynamic Standing Balance minimal assist;1-person assist  -AG     Position/Device Used, Standing Balance supported  -AG               User Key  (r) = Recorded By, (t) = Taken By, (c) = Cosigned By      Initials Name Provider Type    AG Cheko Whittaker OT Occupational Therapist                   Goals/Plan       Row Name 10/13/24 1054          Bed Mobility Goal 1 (OT)    Activity/Assistive Device (Bed Mobility Goal 1, OT) bed mobility activities, all  -AG      [place] Level/Cues Needed (Bed Mobility Goal 1, OT) modified independence  -AG     Time Frame (Bed Mobility Goal 1, OT) short term goal (STG);2 weeks  -AG       Row Name 10/13/24 1054          Transfer Goal 1 (OT)    Activity/Assistive Device (Transfer Goal 1, OT) transfers, all  -AG     [place] Level/Cues Needed (Transfer Goal 1, OT) contact guard required  -AG     Time Frame (Transfer Goal 1, OT) short term goal (STG);2 weeks  -AG     Progress/Outcome (Transfer Goal 1, OT) new goal  -AG       Row Name 10/13/24 1054          Dressing Goal 1 (OT)    Activity/Device (Dressing Goal 1, OT) dressing skills, all;upper body dressing;lower body dressing  -AG     [place]/Cues Needed (Dressing Goal 1, OT) contact guard required  -AG     Time Frame (Dressing Goal 1, OT) short term goal (STG);2 weeks  -AG     Progress/Outcome (Dressing Goal 1, OT) new goal  -AG       Row Name 10/13/24 1054          Toileting Goal 1 (OT)    Activity/Device (Toileting Goal 1, OT) toileting skills, all  -AG     [place] Level/Cues Needed (Toileting Goal 1, OT) minimum assist (75% or more patient effort)  -AG     Time Frame (Toileting Goal 1, OT) short term goal (STG);2 weeks  -AG     Progress/Outcome (Toileting Goal 1, OT) new goal  -AG       Row Name 10/13/24 1051          Grooming Goal 1 (OT)    Activity/Device (Grooming Goal 1, OT) grooming skills, all  -AG     [place] (Grooming Goal 1, OT) modified independence  -AG     Time Frame (Grooming Goal 1, OT) short term goal (STG);2 weeks  -AG     Progress/Outcome (Grooming Goal 1, OT) new goal  -AG       Row Name 10/13/24 1057          Therapy Assessment/Plan (OT)    Planned Therapy Interventions (OT) activity tolerance training;BADL retraining;functional balance retraining;occupation/activity based interventions;patient/caregiver education/training;strengthening exercise;transfer/mobility retraining  -AG               User Key  (r) = Recorded By, (t) = Taken By, (c) =  Cosigned By      Initials Name Provider Type     Cheko Whittaker OT Occupational Therapist                   Clinical Impression       Row Name 10/13/24 1049          Pain Assessment    Pretreatment Pain Rating 0/10 - no pain  -AG     Posttreatment Pain Rating 0/10 - no pain  -AG       Row Name 10/13/24 1049          Plan of Care Review    Plan of Care Reviewed With patient  -AG     Progress no change  -AG     Outcome Evaluation 74-year-old male admitted to [facility] w hyperglycemia, medical work up also revealing UTI. Pt has history of dementia and parkinson's disease. Prior to admission pt. was living in a facility, received assistance with ADLS and IADLS, reports stand pivot transfers with 1 person assist. At time of eval pt. was AxO3, answers all hx questions and able to converse well with therapist. Pt. completed bed mob min A, STS CGA-min A and transfers min HHA. Pt. denies pain and may benefit from skilled OT services while inpatient to address deficits with balance, strength and act uziel. OT rec DC back to facility.  -AG       Row Name 10/13/24 1049          Therapy Assessment/Plan (OT)    Rehab Potential (OT) fair  -AG     Therapy Frequency (OT) 3 times/wk  -AG       Row Name 10/13/24 1049          Therapy Plan Review/Discharge Plan (OT)    Anticipated Discharge Disposition (OT) other (see comments)  LTC  -AG       Row Name 10/13/24 1049          Positioning and Restraints    Pre-Treatment Position in bed  -AG     Post Treatment Position chair  -AG     In Chair notified nsg;reclined;call light within reach;encouraged to call for assist;exit alarm on  -AG               User Key  (r) = Recorded By, (t) = Taken By, (c) = Cosigned By      Initials Name Provider Type     Cheko Whittaker OT Occupational Therapist                   Outcome Measures       Row Name 10/13/24 105          How much help from another is currently needed...    Putting on and taking off regular lower body clothing? 2  -AG     Bathing  (including washing, rinsing, and drying) 2  -AG     Toileting (which includes using toilet bed pan or urinal) 1  -AG     Putting on and taking off regular upper body clothing 3  -AG     Taking care of personal grooming (such as brushing teeth) 3  -AG     Eating meals 4  -AG     AM-PAC 6 Clicks Score (OT) 15  -AG       Row Name 10/13/24 1054          Functional Assessment    Outcome Measure Options AM-PAC 6 Clicks Daily Activity (OT)  -AG               User Key  (r) = Recorded By, (t) = Taken By, (c) = Cosigned By      Initials Name Provider Type    Cheko De La Cruz OT Occupational Therapist                    Occupational Therapy Education       Title: PT OT SLP Therapies (Done)       Topic: Occupational Therapy (Done)       Point: ADL training (Done)       Description:   Instruct learner(s) on proper safety adaptation and remediation techniques during self care or transfers.   Instruct in proper use of assistive devices.                  Learning Progress Summary            Patient Acceptance, E,TB, VU by  at 10/13/2024 1054                      Point: Home exercise program (Done)       Description:   Instruct learner(s) on appropriate technique for monitoring, assisting and/or progressing therapeutic exercises/activities.                  Learning Progress Summary            Patient Acceptance, E,TB, VU by  at 10/13/2024 1054                      Point: Precautions (Done)       Description:   Instruct learner(s) on prescribed precautions during self-care and functional transfers.                  Learning Progress Summary            Patient Acceptance, E,TB, VU by  at 10/13/2024 1054                      Point: Body mechanics (Done)       Description:   Instruct learner(s) on proper positioning and spine alignment during self-care, functional mobility activities and/or exercises.                  Learning Progress Summary            Patient Acceptance, E,TB, VU by  at 10/13/2024 1054                                       User Key       Initials Effective Dates Name Provider Type Discipline     01/23/24 -  Cheko Whittaker, MARCK Occupational Therapist OT                  OT Recommendation and Plan  Planned Therapy Interventions (OT): activity tolerance training, BADL retraining, functional balance retraining, occupation/activity based interventions, patient/caregiver education/training, strengthening exercise, transfer/mobility retraining  Therapy Frequency (OT): 3 times/wk  Plan of Care Review  Plan of Care Reviewed With: patient  Progress: no change  Outcome Evaluation: 74-year-old male admitted to [facility] w hyperglycemia, medical work up also revealing UTI. Pt has history of dementia and parkinson's disease. Prior to admission pt. was living in a facility, received assistance with ADLS and IADLS, reports stand pivot transfers with 1 person assist. At time of eval pt. was AxO3, answers all hx questions and able to converse well with therapist. Pt. completed bed mob min A, STS CGA-min A and transfers min HHA. Pt. denies pain and may benefit from skilled OT services while inpatient to address deficits with balance, strength and act uziel. OT rec DC back to facility.     Time Calculation:   Evaluation Complexity (OT)  Review Occupational Profile/Medical/Therapy History Complexity: expanded/moderate complexity  Assessment, Occupational Performance/Identification of Deficit Complexity: 3-5 performance deficits  Clinical Decision Making Complexity (OT): detailed assessment/moderate complexity  Overall Complexity of Evaluation (OT): moderate complexity     Time Calculation- OT       Row Name 10/13/24 1055             Time Calculation- OT    OT Start Time 1000  -AG      OT Stop Time 1024  -AG      OT Time Calculation (min) 24 min  -AG      Total Timed Code Minutes- OT 16 minute(s)  -AG      OT Received On 10/13/24  -      OT - Next Appointment 10/15/24  -      OT Goal Re-Cert Due Date 10/27/24  -         Timed Charges     74321 - OT Self Care/Mgmt Minutes 16  -AG         Untimed Charges    OT Eval/Re-eval Minutes 8  -AG         Total Minutes    Timed Charges Total Minutes 16  -AG      Untimed Charges Total Minutes 8  -AG       Total Minutes 24  -AG                User Key  (r) = Recorded By, (t) = Taken By, (c) = Cosigned By      Initials Name Provider Type     Cheko Whittaker, OT Occupational Therapist                  Therapy Charges for Today       Code Description Service Date Service Provider Modifiers Qty    [id number]  OT SELF CARE/MGMT/TRAIN EA 15 MIN 10/13/2024 Cheko Whittaker, OT GO 1    [id number]  OT EVAL MOD COMPLEXITY 2 10/13/2024 Cheko Whittaker, OT GO 1                 Cheko Whittaker OT  10/13/2024

## 2024-10-13 NOTE — PROGRESS NOTES
Name: Singh Hatch ADMIT: 10/11/2024   : 1950  PCP: Kimberly Tovar DO    MRN: [id number] LOS: 0 days   AGE/SEX: 74 y.o. male  ROOM: [facility]     Subjective   Subjective   Voices no complaint and says he feels normal       Objective   Objective   Vital Signs  Temp:  [97.6 °F (36.4 °C)-98.1 °F (36.7 °C)] 97.7 °F (36.5 °C)  Heart Rate:  [72-98] 97  Resp:  [16-18] 16  BP: (116-131)/(69-78) 125/78  SpO2:  [94 %-97 %] 94 %  on   ;   Device (Oxygen Therapy): room air  Body mass index is 22.81 kg/m².  Physical Exam  Vitals reviewed.   Constitutional:       General: He is not in acute distress.  Cardiovascular:      Rate and Rhythm: Normal rate and regular rhythm.   Pulmonary:      Effort: No respiratory distress.      Breath sounds: Normal breath sounds.   Abdominal:      Palpations: Abdomen is soft.      Tenderness: There is no abdominal tenderness.   Musculoskeletal:      Right lower leg: No edema.      Left lower leg: No edema.   Skin:     General: Skin is warm and dry.   Neurological:      Mental Status: He is alert.      Comments: Frequent movement and tremor   Psychiatric:         Mood and Affect: Mood normal.       Results Review     I reviewed the patient's new clinical results.  Results from last 7 days   Lab Units 10/13/24  0712 10/12/24  0640 10/11/24  2300   WBC 10*3/mm3 9.00 11.24* 9.99   HEMOGLOBIN g/dL 12.5* 12.2* 13.1   PLATELETS 10*3/mm3 205 214 237     Results from last 7 days   Lab Units 10/13/24  0712 10/12/24  0640 10/11/24  2300   SODIUM mmol/L 144 145 142   POTASSIUM mmol/L 4.6 4.1 4.1   CHLORIDE mmol/L 108* 109* 106   CO2 mmol/L 27.0 27.1 27.0   BUN mg/dL 25* 19 21   CREATININE mg/dL 1.34* 1.25 1.27   GLUCOSE mg/dL 149* 78 126*   EGFR mL/min/1.73 56.3* 61.2 60.0*     Results from last 7 days   Lab Units 10/13/24  0712 10/12/24  0640 10/11/24  2300   ALBUMIN g/dL 3.6 3.5 3.9   BILIRUBIN mg/dL 0.3 0.2 0.3   ALK PHOS U/L 116 107 122*   AST (SGOT) U/L 10 14 8   ALT (SGPT) U/L 13 12 9      Results from last 7 days   Lab Units 10/13/24  0712 10/12/24  0640 10/11/24  2300   CALCIUM mg/dL 9.3 8.7 9.7   ALBUMIN g/dL 3.6 3.5 3.9   MAGNESIUM mg/dL  --   --  2.4       Hemoglobin A1C   Date/Time Value Ref Range Status   10/13/2024 0712 8.10 (H) 4.80 - 5.60 % Final     Glucose   Date/Time Value Ref Range Status   10/13/2024 1624 121 70 - 130 mg/dL Final   10/13/2024 1150 260 (H) 70 - 130 mg/dL Final   10/12/2024 0429 91 70 - 130 mg/dL Final       XR Chest 1 View    Result Date: 10/11/2024  No acute findings.  This report was finalized on 10/11/2024 10:47 PM by Dr. Marlene Krishnan M.D on Workstation: [id number]       I have personally reviewed all medications:  Scheduled Medications  aspirin, 81 mg, Oral, Daily  carbidopa-levodopa, 1.5 tablet, Oral, 4x Daily  carbidopa-levodopa CR, 1 tablet, Oral, Nightly  insulin lispro, 2-9 Units, Subcutaneous, 4x Daily AC & at Bedtime  mirtazapine, 15 mg, Oral, Nightly  vitamin B-12, 1,000 mcg, Oral, Daily  [START ON 10/16/2024] vitamin D, 50,000 Units, Oral, Weekly    Infusions   Diet  Diet: Diabetic; Consistent Carbohydrate; Fluid Consistency: Thin (IDDSI 0)    I have personally reviewed:  [x]  Laboratory   [x]  Microbiology   []  Radiology   []  EKG/Telemetry  []  Cardiology/Vascular   []  Pathology    [x]  Records       Assessment/Plan     Active Hospital Problems    Diagnosis  POA    **Urinary tract infection in male [N39.0]  Yes    Chronic indwelling Holland catheter [Z97.8]  Not Applicable    Dementia [F03.90]  Yes    Parkinson disease [G20.A1]  Yes      Resolved Hospital Problems   No resolved problems to display.       74 y.o. male admitted with somnolence and ?UTI.    Reviewed chart and noted that home meds had never been obtained from facility so he missed all meds last 24 hrs. RN able to verify and we restarted today.    Somnolence seemingly resolved. No real significant urinary symptoms. Cx is growing GNR but ID does not recommend treating given lack of  symptoms and possible colonization    Supposedly had unctrl DM2 with hyperglycemia at the facility but has been good here. Added SSI as needed. Will not resume basal insulin yet.    Suspect this was metabolic encephalopathy due to hyperglycemia and underlying PD... If does well with restarting his home meds today will plan dc tomorrow.      SCDs  Dispo: Back to SNF as above      Tawanda Ortiz MD  [facility]  10/13/24  18:49 EDT

## 2024-10-13 NOTE — THERAPY EVALUATION
Patient Name: Singh Hatch  : 1950    MRN: [id number]                              Today's Date: 10/13/2024       Admit Date: 10/11/2024    Visit Dx:     ICD-10-CM ICD-9-CM   1. Urinary tract infection in male  N39.0 599.0   2. Acute encephalopathy  G93.40 348.30   3. History of dementia  Z86.59 V11.8     Patient Active Problem List   Diagnosis    Urinary tract infection in male    Chronic indwelling Holland catheter    Dementia    Parkinson disease     Past Medical History:   Diagnosis Date    Age-related osteoporosis without current pathological fracture     Atherosclerotic heart disease of native coronary artery without angina pectoris     Essential (primary) hypertension     Irritant contact dermatitis due to fecal, urinary or dual incontinence     Long term (current) use of insulin     Mixed hyperlipidemia     Other cervical disc degeneration, unspecified cervical region     Other intervertebral disc degeneration, 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     Other obstructive and reflux uropathy     Other specified anxiety disorders     Parkinson's disease without dyskinesia, without mention of fluctuations     Personal history of malignant neoplasm of prostate     Type 2 diabetes mellitus with hyperglycemia     Unspecified dementia, unspecified severity, without behavioral disturbance, psychotic disturbance, mood disturbance, and anxiety     Vitamin D deficiency, unspecified      History reviewed. No pertinent surgical history.   General Information       Row Name 10/13/24 1607          Physical Therapy Time and Intention    Document Type evaluation  -LB     Mode of Treatment physical therapy  -LB       Row Name 10/13/24 1607          General Information    Patient Profile Reviewed yes  -LB     Existing Precautions/Restrictions fall  -LB     Barriers to Rehab previous functional deficit  -LB       Row Name 10/13/24 1607          Living Environment    People in Home facility resident  assitive  living  -LB       Row Name 10/13/24 1607          Cognition    Orientation Status (Cognition) oriented x 3  -LB       Row Name 10/13/24 1607          Safety Issues/Impairments Affecting Functional Mobility    Impairments Affecting Function (Mobility) balance;endurance/activity tolerance;strength;motor control  -LB               User Key  (r) = Recorded By, (t) = Taken By, (c) = Cosigned By      Initials Name Provider Type    LB Ariana Harmon PT Physical Therapist                   Mobility       Row Name 10/13/24 1607          Bed Mobility    Supine-Sit [place] (Bed Mobility) verbal cues;moderate assist (50% patient effort)  -LB     Assistive Device (Bed Mobility) bed rails;head of bed elevated  -LB       Row Name 10/13/24 1607          Sit-Stand Transfer    Sit-Stand [place] (Transfers) verbal cues;minimum assist (75% patient effort)  -LB     Assistive Device (Sit-Stand Transfers) walker, front-wheeled  -LB       Row Name 10/13/24 1607          Gait/Stairs (Locomotion)    [place] Level (Gait) verbal cues;minimum assist (75% patient effort);moderate assist (50% patient effort)  -LB     Assistive Device (Gait) walker, front-wheeled  -LB     Distance in Feet (Gait) 40  -LB     Deviations/Abnormal Patterns (Gait) bilateral deviations;ataxic;base of support, narrow;stride length decreased;festinating/shuffling  -LB     Bilateral Gait Deviations forward flexed posture  -LB     Left Sided Gait Deviations decreased knee extension  -LB     Right Sided Gait Deviations decreased knee extension  -LB               User Key  (r) = Recorded By, (t) = Taken By, (c) = Cosigned By      Initials Name Provider Type    LB Ariana Harmon PT Physical Therapist                   Obj/Interventions       Row Name 10/13/24 1609          Range of Motion Comprehensive    Comment, General Range of Motion ankle grossly WFL; knees lack full extension at least 20 degrees from extension  -LB       Row Name 10/13/24 1607           Strength Comprehensive (MMT)    Comment, General Manual Muscle Testing (MMT) Assessment LEs grossly 3/5  -LB       Row Name 10/13/24 1609          Balance    Static Sitting Balance standby assist;contact guard  -LB     Static Standing Balance minimal assist  -LB     Dynamic Standing Balance minimal assist;moderate assist  -LB     Position/Device Used, Standing Balance walker, front-wheeled  -LB     Comment, Balance dyskensia affects balance  -LB       Row Name 10/13/24 1609          Sensory Assessment (Somatosensory)    Sensory Assessment (Somatosensory) not tested  -LB               User Key  (r) = Recorded By, (t) = Taken By, (c) = Cosigned By      Initials Name Provider Type    LB Ariana Harmon, PT Physical Therapist                   Goals/Plan       Row Name 10/13/24 1616          Bed Mobility Goal 1 (PT)    Activity/Assistive Device (Bed Mobility Goal 1, PT) sit to supine/supine to sit  -LB     [place] Level/Cues Needed (Bed Mobility Goal 1, PT) standby assist  -LB     Time Frame (Bed Mobility Goal 1, PT) long term goal (LTG);5 days  -LB       Row Name 10/13/24 1616          Transfer Goal 1 (PT)    Activity/Assistive Device (Transfer Goal 1, PT) sit-to-stand/stand-to-sit;walker, rolling  -LB     [place] Level/Cues Needed (Transfer Goal 1, PT) contact guard required  -LB     Time Frame (Transfer Goal 1, PT) long term goal (LTG);5 days  -LB       Row Name 10/13/24 1616          Gait Training Goal 1 (PT)    Activity/Assistive Device (Gait Training Goal 1, PT) walker, rolling  -LB     [place] Level (Gait Training Goal 1, PT) contact guard required;minimum assist (75% or more patient effort)  -LB     Distance (Gait Training Goal 1, PT) 60  -LB     Time Frame (Gait Training Goal 1, PT) long term goal (LTG);5 days  -LB       Row Name 10/13/24 1616          Therapy Assessment/Plan (PT)    Planned Therapy Interventions (PT) balance training;bed mobility training;patient/family  education;strengthening;transfer training  -LB               User Key  (r) = Recorded By, (t) = Taken By, (c) = Cosigned By      Initials Name Provider Type    Ariana Siddiqui, PT Physical Therapist                   Clinical Impression       Row Name 10/13/24 1611          Pain    Pretreatment Pain Rating 0/10 - no pain  -LB     Posttreatment Pain Rating 0/10 - no pain  -LB       Row Name 10/13/24 1611          Plan of Care Review    Plan of Care Reviewed With patient  -LB     Outcome Evaluation  is a pleasant 75 y/o male aditted with hyperglycemia and UTI.  Pt hasd history of PD, and dementia and pt reports living in assistive living and still walking with a walker.  The patient has decreased ROM in bilateral LE but functional strength and ROM to ambulate short distance with FWW and Min to Mod A .  The patient has dyskenia and bradykensia.  The patient does require assist for balance and cues for walker placement for safety.  The patient will benefit from PT services for mobilty to allow return to [facility].  -LB       Row Name 10/13/24 1611          Therapy Assessment/Plan (PT)    Rehab Potential (PT) fair  -LB     Criteria for Skilled Interventions Met (PT) yes  -LB     Therapy Frequency (PT) 5 times/wk  -LB       Row Name 10/13/24 1611          Positioning and Restraints    Pre-Treatment Position in bed  -LB     Post Treatment Position bed  -LB     In Bed supine;exit alarm on;encouraged to call for assist;call light within reach  -LB               User Key  (r) = Recorded By, (t) = Taken By, (c) = Cosigned By      Initials Name Provider Type    Ariana Siddiqui, PT Physical Therapist                   Outcome Measures       Row Name 10/13/24 1617 10/13/24 0800       How much help from another person do you currently need...    Turning from your back to your side while in flat bed without using bedrails? 2  -LB 2  -JS    Moving from lying on back to sitting on the side of a flat bed without bedrails? 2   -LB 2  -JS    Moving to and from a bed to a chair (including a wheelchair)? 2  -LB 2  -JS    Standing up from a chair using your arms (e.g., wheelchair, bedside chair)? 3  -LB 2  -JS    Climbing 3-5 steps with a railing? 1  -LB 1  -JS    To walk in hospital room? 2  -LB 2  -JS    AM-PAC 6 Clicks Score (PT) 12  -LB 11  -JS      Row Name 10/13/24 1054          Functional Assessment    Outcome Measure Options AM-PAC 6 Clicks Daily Activity (OT)  -AG               User Key  (r) = Recorded By, (t) = Taken By, (c) = Cosigned By      Initials Name Provider Type    LB Ariana Harmon, PT Physical Therapist    Nancy Youssef, RN Registered Nurse    Cheko De La Cruz, OT Occupational Therapist                                 Physical Therapy Education       Title: PT OT SLP Therapies (Done)       Topic: Physical Therapy (Done)       Point: Mobility training (Done)       Learning Progress Summary            Patient Acceptance, E,TB, VU,NR by  at 10/13/2024 1618                                      User Key       Initials Effective Dates Name Provider Type Discipline     06/16/21 -  Ariana Harmon, PT Physical Therapist PT                  PT Recommendation and Plan  Planned Therapy Interventions (PT): balance training, bed mobility training, patient/family education, strengthening, transfer training  Outcome Evaluation:  is a pleasant 75 y/o male aditted with hyperglycemia and UTI.  Pt hasd history of PD, and dementia and pt reports living in assistive living and still walking with a walker.  The patient has decreased ROM in bilateral LE but functional strength and ROM to ambulate short distance with FWW and Min to Mod A .  The patient has dyskenia and bradykensia.  The patient does require assist for balance and cues for walker placement for safety.  The patient will benefit from PT services for mobilty to allow return to [facility].     Time Calculation:         PT Charges       Row Name 10/13/24 1631              Time Calculation    Start Time 1540  -LB      Stop Time 1554  -LB      Time Calculation (min) 14 min  -LB      PT Received On 10/13/24  -LB      PT - Next Appointment 10/14/24  -LB      PT Goal Re-Cert Due Date 10/18/24  -LB                User Key  (r) = Recorded By, (t) = Taken By, (c) = Cosigned By      Initials Name Provider Type    Ariana Siddiqui, PT Physical Therapist                  Therapy Charges for Today       Code Description Service Date Service Provider Modifiers Qty    20357979644 HC PT EVAL MOD COMPLEXITY 2 10/13/2024 Ariana Harmon PT GP 1            PT G-Codes  Outcome Measure Options: AM-PAC 6 Clicks Daily Activity (OT)  AM-PAC 6 Clicks Score (PT): 12  AM-PAC 6 Clicks Score (OT): 15  PT Discharge Summary  Anticipated Discharge Disposition (PT): assisted living    Ariana Harmon, PT  10/13/2024

## 2024-10-13 NOTE — PLAN OF CARE
Goal Outcome Evaluation:      Patient resting in bed at this time, see MAR for medications administered this shift. Room air in place, alert and oriented to self only. 20g in right forearm, specialty bed noted. Denies pain, plan if care is ongoing.

## 2024-10-13 NOTE — PLAN OF CARE
Goal Outcome Evaluation:         Patient is more alert and involved today, in that he knows his current location and his name.  He has been very hungry and snacks provided.  Patient up to chair with OT and walked to doorway of room with PT.  He gives no indication that he is in distress.    Holland catheter changed out with only minimal discomfort to the patient.    WCTM

## 2024-10-13 NOTE — CONSULTS
"Nutrition Services    Patient Name:  Singh Hatch  YOB: 1950  MRN: [id number]  Admit Date:  10/11/2024    Assessment Date:  10/13/24    NUTRITION SCREENING      Reason for Encounter Nurse practioner/physician assistant consult, Need for education   Diagnosis/Problem UTI       PO Diet Diet: Diabetic; Consistent Carbohydrate; Fluid Consistency: Thin (IDDSI 0)   Supplements Boost TID   PO Intake % 100% x 1 meal       Medications MAR reviewed by RD   Labs  Listed below, reviewed   Physical Findings Alert to self only (per nursing notes)   GI Function Last BM unknown to patient   Skin Status B=13, wound noted but no location or type provided       Height  Weight  BMI  Weight Trend     Height: 175.3 cm (69.02\")  Weight: 70.1 kg (154 lb 8.7 oz) (10/12/24 0400)  Body mass index is 22.81 kg/m².  Unknown       Nutrition Problem (PES) Predicted suboptimal intake related to UTI, dementia as evidenced by report/observation.       Intervention/Plan Patient with dementia and Parkinson's.  Noted to be alert to self only.  From NH.      Diet education not appropriate at this time.    RD to follow up per protocol.     Results from last 7 days   Lab Units 10/13/24  0712 10/12/24  0640 10/11/24  2300   SODIUM mmol/L 144 145 142   POTASSIUM mmol/L 4.6 4.1 4.1   CHLORIDE mmol/L 108* 109* 106   CO2 mmol/L 27.0 27.1 27.0   BUN mg/dL 25* 19 21   CREATININE mg/dL 1.34* 1.25 1.27   CALCIUM mg/dL 9.3 8.7 9.7   BILIRUBIN mg/dL 0.3 0.2 0.3   ALK PHOS U/L 116 107 122*   ALT (SGPT) U/L 13 12 9   AST (SGOT) U/L 10 14 8   GLUCOSE mg/dL 149* 78 126*     Results from last 7 days   Lab Units 10/13/24  0712 10/12/24  0640 10/11/24  2300   MAGNESIUM mg/dL  --   --  2.4   HEMOGLOBIN g/dL 12.5*   < > 13.1   HEMATOCRIT % 38.8   < > 39.3    < > = values in this interval not displayed.     Lab Results   Component Value Date    HGBA1C 8.10 (H) 10/13/2024         Electronically signed by:  Mita Hassan RD  10/13/24 09:10 EDT  "

## 2024-10-13 NOTE — PLAN OF CARE
Goal Outcome Evaluation:  Plan of Care Reviewed With: patient        Progress: no change  Outcome Evaluation: 74-year-old male admitted to [facility] w hyperglycemia, medical work up also revealing UTI. Pt has history of dementia and parkinson's disease. Prior to admission pt. was living in a facility, received assistance with ADLS and IADLS, reports stand pivot transfers with 1 person assist. At time of eval pt. was AxO3, answers all hx questions and able to converse well with therapist. Pt. completed bed mob min A, STS CGA-min A and transfers min HHA. Pt. denies pain and may benefit from skilled OT services while inpatient to address deficits with balance, strength and act uziel. OT rec DC back to facility.    Anticipated Discharge Disposition (OT): other (see comments) (LTC)

## 2024-10-13 NOTE — CONSULTS
"Referring Provider: Dr. West    Reason for Consultation: \"UTI\"    History of present illness:  Singh Hatch is a 74 y.o. with PMH Parkinson's disease, dementia, bladder cancer, and chronic indwelling Holland catheter who I am asked to evaluate and give opinion for \"UTI\". History is obtained from the patient and review of the old medical records which I summarize/synthesize as follows: He was brought to the emergency room yesterday from his living facility due to hyperglycemia and decreased responsiveness.  He could not provide any additional history to the ER staff per their note. In the emergency room and since admission he has been afebrile with a normal heart rate and BP.  Labs were notable for WBC 9, creatinine 1.3, UA with TNTC WBCs but also 7-12 squamous cells.  His urine culture is pending.  He was given a one-time dose of ceftriaxone, and ID has been asked to evaluate.    This morning he tells me he feels back to his baseline.  He tells me that his catheter was last changed about 2 weeks ago.  He confirms he was not having any urinary symptoms prior to arrival.    Antibiotic allergies and intolerances:  None    Medications:    Current Facility-Administered Medications:     acetaminophen (TYLENOL) tablet 650 mg, 650 mg, Oral, Q4H PRN **OR** acetaminophen (TYLENOL) 160 MG/5ML oral solution 650 mg, 650 mg, Oral, Q4H PRN **OR** acetaminophen (TYLENOL) suppository 650 mg, 650 mg, Rectal, Q4H PRN, Cira Cano APRN    sennosides-docusate (PERICOLACE) 8.6-50 MG per tablet 2 tablet, 2 tablet, Oral, BID PRN **AND** polyethylene glycol (MIRALAX) packet 17 g, 17 g, Oral, Daily PRN **AND** bisacodyl (DULCOLAX) EC tablet 5 mg, 5 mg, Oral, Daily PRN **AND** bisacodyl (DULCOLAX) suppository 10 mg, 10 mg, Rectal, Daily PRN, Cira Cano APRN    dextrose (D50W) (25 g/50 mL) IV injection 25 g, 25 g, Intravenous, Q15 Min PRN, Tawanda Ortiz MD    dextrose (GLUTOSE) oral gel 15 g, 15 g, Oral, Q15 " Min PRN, Tawanda Ortiz MD    famotidine (PEPCID) tablet 20 mg, 20 mg, Oral, BID PRN, Cira Cano APRN    glucagon (GLUCAGEN) injection 1 mg, 1 mg, Intramuscular, Q15 Min PRN, Tawanda Ortiz MD    insulin lispro (HUMALOG/ADMELOG) injection 2-9 Units, 2-9 Units, Subcutaneous, 4x Daily AC & at Bedtime, Tawanda Ortiz MD    melatonin tablet 5 mg, 5 mg, Oral, Nightly PRN, Cira Cano APRN    nitroglycerin (NITROSTAT) SL tablet 0.4 mg, 0.4 mg, Sublingual, Q5 Min PRN, Cira Cano APRN    ondansetron (ZOFRAN) injection 4 mg, 4 mg, Intravenous, Q6H PRN, Cira Cano APRN    sodium chloride 0.9 % flush 10 mL, 10 mL, Intravenous, PRN, Cira Cano APRN      Objective   Vital Signs   Temp:  [97.6 °F (36.4 °C)-98.1 °F (36.7 °C)] 97.7 °F (36.5 °C)  Heart Rate:  [72-85] 72  Resp:  [16-18] 18  BP: (116-123)/(69-74) 118/69    Physical Exam:   General: awake, alert, NAD, very nice, just finished breakfast  Eyes: no scleral icterus  Cardiovascular: NR  Respiratory: normal work of breathing on ambient air  GI: Abdomen is soft, not tender  : + Holland catheter  Psychiatric: Normal mood and affect     Labs:     Lab Results   Component Value Date    WBC 9.00 10/13/2024    HGB 12.5 (L) 10/13/2024    HCT 38.8 10/13/2024    MCV 90.0 10/13/2024     10/13/2024       Lab Results   Component Value Date    GLUCOSE 149 (H) 10/13/2024    BUN 25 (H) 10/13/2024    CREATININE 1.34 (H) 10/13/2024    EGFRIFNONA 99 10/02/2020    EGFRIFAFRI >60 01/08/2023    BCR 18.7 10/13/2024    CO2 27.0 10/13/2024    CALCIUM 9.3 10/13/2024    ALBUMIN 3.6 10/13/2024    LABIL2 1.1 01/05/2023    AST 10 10/13/2024    ALT 13 10/13/2024     Lab Results   Component Value Date    HGBA1C 8.10 (H) 10/13/2024     UA with TNTC WBCs, 4+ bacteria, 7-12 squamous cells, 11-20 RBCs, moderate blood, 100 protein, large LE, positive nitrite      Microbiology:  10/12 UCX: Pending    Radiology:  CXR  personally reviewed and is negative for pneumonia    ASSESSMENT/PLAN:  Chronic indwelling Holland catheter  History of bladder cancer  Dementia  Altered mental status  Parkinson's disease  Controlled type 2 diabetes  CKD 3A    He says his mental status feels back to baseline.  He confirms he was not having any urinary symptoms prior to arrival.  An abnormal urinalysis is expected in someone with a chronic indwelling Holland catheter.  He is afebrile with a normal WBC which would argue against infection.     I think it would be reasonable to exchange his Holland catheter while he is here.    D/W his hospitalist.    ID will sign off but please call me at [phone] if any further ID questions or concerns.       fall

## 2024-10-13 NOTE — PLAN OF CARE
Goal Outcome Evaluation:  Plan of Care Reviewed With: patient           Outcome Evaluation:  is a pleasant 75 y/o male aditted with hyperglycemia and UTI.  Pt hasd history of PD, and dementia and pt reports living in assistive living and still walking with a walker.  The patient has decreased ROM in bilateral LE but functional strength and ROM to ambulate short distance with FWW and Min to Mod A .  The patient has dyskenia and bradykensia.  The patient does require assist for balance and cues for walker placement for safety.  The patient will benefit from PT services for mobilty to allow return to [facility].    Anticipated Discharge Disposition (PT): assisted living

## 2024-10-14 VITALS
DIASTOLIC BLOOD PRESSURE: 87 MMHG | HEIGHT: 69 IN | RESPIRATION RATE: 16 BRPM | HEART RATE: 79 BPM | BODY MASS INDEX: 22.89 KG/M2 | OXYGEN SATURATION: 98 % | SYSTOLIC BLOOD PRESSURE: 137 MMHG | TEMPERATURE: 97.7 F | WEIGHT: 154.54 LBS

## 2024-10-14 PROBLEM — N39.0 URINARY TRACT INFECTION IN MALE: Status: RESOLVED | Noted: 2024-10-12 | Resolved: 2024-10-14

## 2024-10-14 LAB
ANION GAP SERPL CALCULATED.3IONS-SCNC: 10.8 MMOL/L (ref 5–15)
BUN SERPL-MCNC: 30 MG/DL (ref 8–23)
BUN/CREAT SERPL: 19.5 (ref 7–25)
CALCIUM SPEC-SCNC: 9 MG/DL (ref 8.6–10.5)
CHLORIDE SERPL-SCNC: 103 MMOL/L (ref 98–107)
CO2 SERPL-SCNC: 24.2 MMOL/L (ref 22–29)
CREAT SERPL-MCNC: 1.54 MG/DL (ref 0.76–1.27)
DEPRECATED RDW RBC AUTO: 49.2 FL (ref 37–54)
EGFRCR SERPLBLD CKD-EPI 2021: 47.6 ML/MIN/1.73
ERYTHROCYTE [DISTWIDTH] IN BLOOD BY AUTOMATED COUNT: 15 % (ref 12.3–15.4)
GLUCOSE BLDC GLUCOMTR-MCNC: 177 MG/DL (ref 70–130)
GLUCOSE BLDC GLUCOMTR-MCNC: 200 MG/DL (ref 70–130)
GLUCOSE SERPL-MCNC: 201 MG/DL (ref 65–99)
HCT VFR BLD AUTO: 36.4 % (ref 37.5–51)
HGB BLD-MCNC: 12.4 G/DL (ref 13–17.7)
MCH RBC QN AUTO: 30.9 PG (ref 26.6–33)
MCHC RBC AUTO-ENTMCNC: 34.1 G/DL (ref 31.5–35.7)
MCV RBC AUTO: 90.8 FL (ref 79–97)
PLATELET # BLD AUTO: 213 10*3/MM3 (ref 140–450)
PMV BLD AUTO: 10 FL (ref 6–12)
POTASSIUM SERPL-SCNC: 4.5 MMOL/L (ref 3.5–5.2)
RBC # BLD AUTO: 4.01 10*6/MM3 (ref 4.14–5.8)
SODIUM SERPL-SCNC: 138 MMOL/L (ref 136–145)
TSH SERPL DL<=0.05 MIU/L-ACNC: 1.24 UIU/ML (ref 0.27–4.2)
VIT B12 BLD-MCNC: 897 PG/ML (ref 211–946)
WBC NRBC COR # BLD AUTO: 11.67 10*3/MM3 (ref 3.4–10.8)

## 2024-10-14 PROCEDURE — 63710000001 INSULIN LISPRO (HUMAN) PER 5 UNITS: Performed by: HOSPITALIST

## 2024-10-14 PROCEDURE — 84443 ASSAY THYROID STIM HORMONE: CPT | Performed by: HOSPITALIST

## 2024-10-14 PROCEDURE — 85027 COMPLETE CBC AUTOMATED: CPT | Performed by: HOSPITALIST

## 2024-10-14 PROCEDURE — 80048 BASIC METABOLIC PNL TOTAL CA: CPT | Performed by: HOSPITALIST

## 2024-10-14 PROCEDURE — 82607 VITAMIN B-12: CPT | Performed by: HOSPITALIST

## 2024-10-14 PROCEDURE — 82948 REAGENT STRIP/BLOOD GLUCOSE: CPT

## 2024-10-14 RX ADMIN — INSULIN LISPRO 4 UNITS: 100 INJECTION, SOLUTION INTRAVENOUS; SUBCUTANEOUS at 08:28

## 2024-10-14 RX ADMIN — INSULIN LISPRO 2 UNITS: 100 INJECTION, SOLUTION INTRAVENOUS; SUBCUTANEOUS at 12:28

## 2024-10-14 RX ADMIN — CARBIDOPA AND LEVODOPA 1.5 TABLET: 25; 250 TABLET ORAL at 06:33

## 2024-10-14 RX ADMIN — Medication 1000 MCG: at 08:28

## 2024-10-14 RX ADMIN — CARBIDOPA AND LEVODOPA 1.5 TABLET: 25; 250 TABLET ORAL at 12:28

## 2024-10-14 RX ADMIN — ASPIRIN 81 MG: 81 TABLET, COATED ORAL at 08:28

## 2024-10-14 NOTE — DISCHARGE SUMMARY
Patient Name: Singh Hatch  : 1950  MRN: [id number]    Date of Admission: 10/11/2024  Date of Discharge:  10/14/2024  Primary Care Physician: Kimberly Tovar DO      Chief Complaint:   Altered Mental Status and Hyperglycemia      Discharge Diagnoses     Active Hospital Problems    Diagnosis  POA    Type 2 diabetes mellitus with hyperglycemia, with long-term current use of insulin [E11.65, Z79.4]  Not Applicable    Acute metabolic encephalopathy [G93.41]  Yes    Chronic indwelling Holland catheter [Z97.8]  Not Applicable    Dementia [F03.90]  Yes    Parkinson disease [G20.A1]  Yes      Resolved Hospital Problems    Diagnosis Date Resolved POA    **Urinary tract infection in male [N39.0] 10/14/2024 Yes        Hospital Course     Mr. Hatch is a 74 y.o. male with a history of Parkinson's, diabetes and chronic indwelling Holland who presented to [facility] initially for altered mental status.  Please see the admitting history and physical for further details.  Apparently he was hyperglycemic to 350 at the nursing home but really was not when he arrived here.  Patient has indwelling Holland catheter and urine looked abnormal as 1 would expect but not overtly infected.  He was given 1 dose of antibiotics but these were not continued after consultation with ID.  They did recommend changing his Holland which we did.  His mentation returned to baseline almost immediately.  He was resumed back on his home meds yesterday and tolerated those well with no periods of somnolence or lethargy.  His sugars have been well-maintained even off of his home Lantus dose although they are just slightly elevated today.  I think it is safe to resume the home Lantus at discharge and have him go back to his nursing facility.  He did have some loose stools overnight.  His creatinine is just slightly elevated so I will give him a small fluid bolus prior to discharge and encouraged him to drink oral fluids.  I would  recheck his creatinine on 10/16.      Day of Discharge     Subjective:  No complaints    Physical Exam:  Temp:  [97.7 °F (36.5 °C)-97.9 °F (36.6 °C)] 97.7 °F (36.5 °C)  Heart Rate:  [78-97] 79  Resp:  [16-18] 16  BP: (112-137)/(72-87) 137/87  Body mass index is 22.81 kg/m².  Physical Exam  Vitals reviewed.   Constitutional:       General: He is not in acute distress.  Cardiovascular:      Rate and Rhythm: Normal rate and regular rhythm.   Pulmonary:      Effort: No respiratory distress.      Breath sounds: Normal breath sounds.   Abdominal:      Palpations: Abdomen is soft.      Tenderness: There is no abdominal tenderness.   Musculoskeletal:      Right lower leg: No edema.      Left lower leg: No edema.   Skin:     General: Skin is warm and dry.   Neurological:      Mental Status: He is alert.      Comments: Frequent movement and tremor   Psychiatric:         Mood and Affect: Mood normal.         Consultants     Consult Orders (all) (From admission, onward)       Start     Ordered    10/12/24 1543  Inpatient Infectious Diseases Consult  Once        Specialty:  Infectious Diseases  Provider:  Jay Steele MD    10/12/24 1542    10/12/24 0519  Inpatient Case Management  Consult  Once        Provider:  (Not yet assigned)    10/12/24 0525    10/12/24 0517  Inpatient Nutrition Consult  Once        Provider:  (Not yet assigned)    10/12/24 0525    10/12/24 0058  [facility] (on-call MD unless specified) Details  Once        Specialty:  Hospitalist  Provider:  (Not yet assigned)    10/12/24 0057                  Procedures     Imaging Results (All)       Procedure Component Value Units Date/Time    XR Chest 1 View [[id number]] Collected: 10/11/24 2246     Updated: 10/11/24 2250    Narrative:      SINGLE VIEW OF THE CHEST     HISTORY: Hyperglycemia     COMPARISON: None available.     FINDINGS:  There is cardiomegaly. There is no vascular congestion. No pneumothorax  or pleural effusion is seen. No  "acute infiltrates are identified. There  are advanced degenerative changes of the right shoulder.       Impression:      No acute findings.     This report was finalized on 10/11/2024 10:47 PM by Dr. Marlene Krishnan M.D on Workstation: [id number]                 Pertinent Labs     Results from last 7 days   Lab Units 10/14/24  0642 10/13/24  0712 10/12/24  0640 10/11/24  2300   WBC 10*3/mm3 11.67* 9.00 11.24* 9.99   HEMOGLOBIN g/dL 12.4* 12.5* 12.2* 13.1   PLATELETS 10*3/mm3 213 205 214 237     Results from last 7 days   Lab Units 10/14/24  0642 10/13/24  0712 10/12/24  0640 10/11/24  2300   SODIUM mmol/L 138 144 145 142   POTASSIUM mmol/L 4.5 4.6 4.1 4.1   CHLORIDE mmol/L 103 108* 109* 106   CO2 mmol/L 24.2 27.0 27.1 27.0   BUN mg/dL 30* 25* 19 21   CREATININE mg/dL 1.54* 1.34* 1.25 1.27   GLUCOSE mg/dL 201* 149* 78 126*   EGFR mL/min/1.73 47.6* 56.3* 61.2 60.0*     Results from last 7 days   Lab Units 10/13/24  0712 10/12/24  0640 10/11/24  2300   ALBUMIN g/dL 3.6 3.5 3.9   BILIRUBIN mg/dL 0.3 0.2 0.3   ALK PHOS U/L 116 107 122*   AST (SGOT) U/L 10 14 8   ALT (SGPT) U/L 13 12 9     Results from last 7 days   Lab Units 10/14/24  0642 10/13/24  0712 10/12/24  0640 10/11/24  2300   CALCIUM mg/dL 9.0 9.3 8.7 9.7   ALBUMIN g/dL  --  3.6 3.5 3.9   MAGNESIUM mg/dL  --   --   --  2.4               Invalid input(s): \"LDLCALC\"  Results from last 7 days   Lab Units 10/12/24  0015   URINECX  >100,000 CFU/mL Gram Negative Bacilli*  <10,000 CFU/mL Gram Positive Cocci*         Test Results Pending at Discharge     Pending Results       None              Discharge Details        Discharge Medications        Continue These Medications        Instructions Start Date   aspirin 81 MG EC tablet   81 mg, Oral, Daily      carbidopa-levodopa CR  MG per CR tablet  Commonly known as: SINEMET CR   1 tablet, Oral, Nightly, Give at 2300      carbidopa-levodopa  MG per tablet  Commonly known as: SINEMET   1.5 tablets, Oral, 4 " Times Daily      dextrose 40 % gel  Commonly known as: GLUTOSE   15 g, Oral, Every 1 Hour PRN      glucagon (human recombinant) 1 MG injection  Commonly known as: GLUCAGEN DIAGNOSTIC   1 mg, Intravenous, Once      insulin glargine 100 UNIT/ML injection  Commonly known as: LANTUS, SEMGLEE   40 Units, Subcutaneous, Nightly      Insulin Lispro 100 UNIT/ML injection  Commonly known as: humaLOG   2-10 Units, Subcutaneous, 3 Times Daily Before Meals, -199=2 unit,200-249=4 unit,250-299=6 unit,300-349=8 unit,350-400=10 unit,>400= call MD       miconazole 2 % cream  Commonly known as: MICOTIN   1 Application, Topical, 2 Times Daily, Clean scrotum with soap and water and apply miconazole to area r/t yeast      mirtazapine 15 MG disintegrating tablet  Commonly known as: REMERON SOL-TAB   15 mg, Translingual, Nightly      vitamin B-12 1000 MCG tablet  Commonly known as: CYANOCOBALAMIN   1,000 mcg, Oral, Daily      vitamin D 1.25 MG (77975 UT) capsule capsule  Commonly known as: ERGOCALCIFEROL   50,000 Units, Oral, Weekly               No Known Allergies    Discharge Disposition:  Skilled Nursing Facility (DC - External)      Discharge Diet:  Diet Order   Procedures    Diet: Diabetic; Consistent Carbohydrate; Fluid Consistency: Thin (IDDSI 0)       Discharge Activity:       CODE STATUS:    Code Status and Medical Interventions: CPR (Attempt to Resuscitate); Full Support   Ordered at: 10/12/24 0557     Code Status (Patient has no pulse and is not breathing):    CPR (Attempt to Resuscitate)     Medical Interventions (Patient has pulse or is breathing):    Full Support       No future appointments.   Follow-up Information       Kimberly Tovar DO .    Specialty: Physical Medicine and Rehabilitation  Contact information:  [address] [facility] [address] [phone]                             Time Spent on Discharge:  Greater than 30 minutes      Tawanda Ortiz MD  [facility]  10/14/24  14:15 EDT

## 2024-10-14 NOTE — CASE MANAGEMENT/SOCIAL WORK
Case Management Discharge Note      Final Note: Return to [facility]. W/c van @4         Selected Continued Care - Admitted Since 10/11/2024       Destination    No services have been selected for the patient.                Durable Medical Equipment    No services have been selected for the patient.                Dialysis/Infusion    No services have been selected for the patient.                Home Medical Care    No services have been selected for the patient.                Therapy    No services have been selected for the patient.                Community Resources    No services have been selected for the patient.                Community & DME    No services have been selected for the patient.                    Transportation Services  W/C Van: [facility]    Final Discharge Disposition Code: 04 - intermediate care facility

## 2024-10-14 NOTE — CASE MANAGEMENT/SOCIAL WORK
Continued Stay Note  [facility]     Patient Name: Singh Hatch  MRN: [id number]  Today's Date: 10/14/2024    Admit Date: 10/11/2024    Plan: Return to [facility]. Justin w/hope couch @ 4   Discharge Plan       Row Name 10/14/24 1302       Plan    [facility] Justin sarmiento/c iza @ 4    Plan Comments Per Shirin, patient is LTC at [facility]. [employer] wheelchair van scheduled for 4. CCP called patient brother and he is aware. Pharmacy is updated.  CCP following.                   Discharge Codes    No documentation.                 Expected Discharge Date and Time       Expected Discharge Date Expected Discharge Time    Oct 14, 2024

## 2024-10-14 NOTE — DISCHARGE PLACEMENT REQUEST
"Dimitri Webber (74 y.o. Male)       Date of Birth   1950    Social Security Number       Address   [address] [address]    Home Phone   [phone]    MRN   [id number]       Mormonism   P[employer]    Marital Status                               Admission Date   10/11/24    Admission Type   Emergency    Admitting Provider   Phong West MD    Attending Provider   Tawanda Ortiz MD    Department, Room/Bed   [address], [address]       Discharge Date       Discharge Disposition       Discharge Destination                                 Attending Provider: Tawanda Ortiz MD    Allergies: No Known Allergies    Isolation: None   Infection: None   Code Status: CPR    Ht: 175.3 cm (69.02\")   Wt: 70.1 kg (154 lb 8.7 oz)    Admission Cmt: None   Principal Problem: Urinary tract infection in male [N39.0]                   Active Insurance as of 10/11/2024       Primary Coverage       Payor Plan Insurance Group Employer/Plan Group    MEDICARE MEDICARE A & B        Payor Plan Address Payor Plan Phone Number Payor Plan Fax Number Effective Dates    [address] [phone]  10/1/2005 - None Entered    [place] [id number]         Subscriber Name Subscriber Birth Date Member ID       DIMITRI WEBBER 1950 [id number]                     Emergency Contacts        (Rel.) Home Phone Work Phone Mobile Phone    Mamie,Lenny (Brother) [phone] -- [phone]    Yasmin Lombardi (Sister) [phone] -- [phone]                "

## 2024-10-16 LAB
BACTERIA SPEC AEROBE CULT: ABNORMAL

## 2025-02-20 ENCOUNTER — HOSPITAL ENCOUNTER (EMERGENCY)
Facility: HOSPITAL | Age: 75
Discharge: NURSING, REHAB OR LONG-TERM CARE | End: 2025-02-20
Attending: EMERGENCY MEDICINE | Admitting: EMERGENCY MEDICINE
Payer: MEDICARE

## 2025-02-20 ENCOUNTER — APPOINTMENT (OUTPATIENT)
Dept: CT IMAGING | Facility: HOSPITAL | Age: 75
End: 2025-02-20
Payer: MEDICARE

## 2025-02-20 VITALS
WEIGHT: 154.54 LBS | OXYGEN SATURATION: 95 % | TEMPERATURE: 97.1 F | HEART RATE: 81 BPM | SYSTOLIC BLOOD PRESSURE: 140 MMHG | RESPIRATION RATE: 18 BRPM | DIASTOLIC BLOOD PRESSURE: 83 MMHG | BODY MASS INDEX: 22.89 KG/M2 | HEIGHT: 69 IN

## 2025-02-20 DIAGNOSIS — R73.9 HYPERGLYCEMIA: ICD-10-CM

## 2025-02-20 DIAGNOSIS — R93.5 ABNORMAL CT OF THE ABDOMEN: ICD-10-CM

## 2025-02-20 DIAGNOSIS — K59.00 CONSTIPATION, UNSPECIFIED CONSTIPATION TYPE: ICD-10-CM

## 2025-02-20 DIAGNOSIS — N39.0 ACUTE UTI: Primary | ICD-10-CM

## 2025-02-20 DIAGNOSIS — N18.9 CHRONIC KIDNEY DISEASE, UNSPECIFIED CKD STAGE: ICD-10-CM

## 2025-02-20 DIAGNOSIS — K86.2 PANCREATIC CYST: ICD-10-CM

## 2025-02-20 LAB
ALBUMIN SERPL-MCNC: 3.4 G/DL (ref 3.5–5.2)
ALBUMIN/GLOB SERPL: 1 G/DL
ALP SERPL-CCNC: 107 U/L (ref 39–117)
ALT SERPL W P-5'-P-CCNC: <5 U/L (ref 1–41)
ANION GAP SERPL CALCULATED.3IONS-SCNC: 9.6 MMOL/L (ref 5–15)
AST SERPL-CCNC: 9 U/L (ref 1–40)
BACTERIA UR QL AUTO: ABNORMAL /HPF
BASOPHILS # BLD AUTO: 0.03 10*3/MM3 (ref 0–0.2)
BASOPHILS NFR BLD AUTO: 0.3 % (ref 0–1.5)
BILIRUB SERPL-MCNC: 0.5 MG/DL (ref 0–1.2)
BILIRUB UR QL STRIP: NEGATIVE
BUN SERPL-MCNC: 27 MG/DL (ref 8–23)
BUN/CREAT SERPL: 18.9 (ref 7–25)
CALCIUM SPEC-SCNC: 9 MG/DL (ref 8.6–10.5)
CHLORIDE SERPL-SCNC: 105 MMOL/L (ref 98–107)
CLARITY UR: CLEAR
CO2 SERPL-SCNC: 26.4 MMOL/L (ref 22–29)
COLOR UR: YELLOW
CREAT SERPL-MCNC: 1.43 MG/DL (ref 0.76–1.27)
D-LACTATE SERPL-SCNC: 1.9 MMOL/L (ref 0.5–2)
DEPRECATED RDW RBC AUTO: 44.8 FL (ref 37–54)
EGFRCR SERPLBLD CKD-EPI 2021: 52.1 ML/MIN/1.73
EOSINOPHIL # BLD AUTO: 0.05 10*3/MM3 (ref 0–0.4)
EOSINOPHIL NFR BLD AUTO: 0.5 % (ref 0.3–6.2)
ERYTHROCYTE [DISTWIDTH] IN BLOOD BY AUTOMATED COUNT: 13.3 % (ref 12.3–15.4)
GLOBULIN UR ELPH-MCNC: 3.3 GM/DL
GLUCOSE SERPL-MCNC: 156 MG/DL (ref 65–99)
GLUCOSE UR STRIP-MCNC: ABNORMAL MG/DL
HCT VFR BLD AUTO: 40.7 % (ref 37.5–51)
HGB BLD-MCNC: 13.2 G/DL (ref 13–17.7)
HGB UR QL STRIP.AUTO: NEGATIVE
HOLD SPECIMEN: NORMAL
HOLD SPECIMEN: NORMAL
HYALINE CASTS UR QL AUTO: ABNORMAL /LPF
IMM GRANULOCYTES # BLD AUTO: 0.05 10*3/MM3 (ref 0–0.05)
IMM GRANULOCYTES NFR BLD AUTO: 0.5 % (ref 0–0.5)
KETONES UR QL STRIP: ABNORMAL
LEUKOCYTE ESTERASE UR QL STRIP.AUTO: ABNORMAL
LIPASE SERPL-CCNC: 18 U/L (ref 13–60)
LYMPHOCYTES # BLD AUTO: 1.4 10*3/MM3 (ref 0.7–3.1)
LYMPHOCYTES NFR BLD AUTO: 12.7 % (ref 19.6–45.3)
MCH RBC QN AUTO: 29.5 PG (ref 26.6–33)
MCHC RBC AUTO-ENTMCNC: 32.4 G/DL (ref 31.5–35.7)
MCV RBC AUTO: 91.1 FL (ref 79–97)
MONOCYTES # BLD AUTO: 0.98 10*3/MM3 (ref 0.1–0.9)
MONOCYTES NFR BLD AUTO: 8.9 % (ref 5–12)
NEUTROPHILS NFR BLD AUTO: 77.1 % (ref 42.7–76)
NEUTROPHILS NFR BLD AUTO: 8.5 10*3/MM3 (ref 1.7–7)
NITRITE UR QL STRIP: POSITIVE
NRBC BLD AUTO-RTO: 0 /100 WBC (ref 0–0.2)
PH UR STRIP.AUTO: 6 [PH] (ref 5–8)
PLATELET # BLD AUTO: 196 10*3/MM3 (ref 140–450)
PMV BLD AUTO: 9.3 FL (ref 6–12)
POTASSIUM SERPL-SCNC: 4.9 MMOL/L (ref 3.5–5.2)
PROT SERPL-MCNC: 6.7 G/DL (ref 6–8.5)
PROT UR QL STRIP: ABNORMAL
RBC # BLD AUTO: 4.47 10*6/MM3 (ref 4.14–5.8)
RBC # UR STRIP: ABNORMAL /HPF
REF LAB TEST METHOD: ABNORMAL
SODIUM SERPL-SCNC: 141 MMOL/L (ref 136–145)
SP GR UR STRIP: 1.02 (ref 1–1.03)
SQUAMOUS #/AREA URNS HPF: ABNORMAL /HPF
UROBILINOGEN UR QL STRIP: ABNORMAL
WBC # UR STRIP: ABNORMAL /HPF
WBC NRBC COR # BLD AUTO: 11.01 10*3/MM3 (ref 3.4–10.8)
WHOLE BLOOD HOLD COAG: NORMAL
WHOLE BLOOD HOLD SPECIMEN: NORMAL

## 2025-02-20 PROCEDURE — 80053 COMPREHEN METABOLIC PANEL: CPT

## 2025-02-20 PROCEDURE — 36415 COLL VENOUS BLD VENIPUNCTURE: CPT

## 2025-02-20 PROCEDURE — 25810000003 SODIUM CHLORIDE 0.9 % SOLUTION: Performed by: EMERGENCY MEDICINE

## 2025-02-20 PROCEDURE — 25510000001 IOPAMIDOL 61 % SOLUTION: Performed by: EMERGENCY MEDICINE

## 2025-02-20 PROCEDURE — 81001 URINALYSIS AUTO W/SCOPE: CPT

## 2025-02-20 PROCEDURE — 74177 CT ABD & PELVIS W/CONTRAST: CPT

## 2025-02-20 PROCEDURE — 83605 ASSAY OF LACTIC ACID: CPT

## 2025-02-20 PROCEDURE — 99285 EMERGENCY DEPT VISIT HI MDM: CPT

## 2025-02-20 PROCEDURE — 83690 ASSAY OF LIPASE: CPT

## 2025-02-20 PROCEDURE — 96365 THER/PROPH/DIAG IV INF INIT: CPT

## 2025-02-20 PROCEDURE — 25010000002 CEFTRIAXONE PER 250 MG: Performed by: EMERGENCY MEDICINE

## 2025-02-20 PROCEDURE — 85025 COMPLETE CBC W/AUTO DIFF WBC: CPT

## 2025-02-20 RX ORDER — CEPHALEXIN 500 MG/1
500 CAPSULE ORAL 4 TIMES DAILY
Qty: 20 CAPSULE | Refills: 0 | Status: SHIPPED | OUTPATIENT
Start: 2025-02-20 | End: 2025-02-25

## 2025-02-20 RX ORDER — AMOXICILLIN 250 MG
1 CAPSULE ORAL DAILY
Qty: 30 TABLET | Refills: 0 | Status: SHIPPED | OUTPATIENT
Start: 2025-02-20 | End: 2025-03-22

## 2025-02-20 RX ORDER — POLYETHYLENE GLYCOL 3350 17 G/17G
17 POWDER, FOR SOLUTION ORAL DAILY
Qty: 510 G | Refills: 0 | Status: SHIPPED | OUTPATIENT
Start: 2025-02-20

## 2025-02-20 RX ORDER — IOPAMIDOL 612 MG/ML
100 INJECTION, SOLUTION INTRAVASCULAR
Status: COMPLETED | OUTPATIENT
Start: 2025-02-20 | End: 2025-02-20

## 2025-02-20 RX ORDER — BISACODYL 10 MG
10 SUPPOSITORY, RECTAL RECTAL DAILY PRN
Qty: 28 SUPPOSITORY | Refills: 0 | Status: SHIPPED | OUTPATIENT
Start: 2025-02-20

## 2025-02-20 RX ORDER — SODIUM CHLORIDE 0.9 % (FLUSH) 0.9 %
10 SYRINGE (ML) INJECTION AS NEEDED
Status: DISCONTINUED | OUTPATIENT
Start: 2025-02-20 | End: 2025-02-20 | Stop reason: HOSPADM

## 2025-02-20 RX ADMIN — IOPAMIDOL 85 ML: 612 INJECTION, SOLUTION INTRAVENOUS at 13:57

## 2025-02-20 RX ADMIN — CEFTRIAXONE SODIUM 1000 MG: 1 INJECTION, POWDER, FOR SOLUTION INTRAMUSCULAR; INTRAVENOUS at 13:06

## 2025-02-20 RX ADMIN — SODIUM CHLORIDE 1000 ML: 9 INJECTION, SOLUTION INTRAVENOUS at 13:03

## 2025-02-20 NOTE — ED PROVIDER NOTES
EMERGENCY DEPARTMENT ENCOUNTER  Room Number:  40/40  Date of encounter:  2/20/2025  PCP: Salomon Mcknight MD  Patient Care Team:  Salomon Mcknight MD as PCP - General (Internal Medicine)     HPI:  Context: Singh Hatch is a 74 y.o. male who presents to the ED c/o chief complaint of abdominal pain.  Patient complains of lower abdominal pain has been present for the last several months.  Pain is intermittent, patient reports can usually be relieved by getting into certain positions, lately cannot.  Patient is unable to characterize pain, does report pain occasionally radiates into his back.  Patient reports nausea, denies any emesis.  Patient reports he has been constipated, unsure of last bowel movement whether it was yesterday or this morning.  Patient endorses dysuria as well as increased urinary frequency.  No fever shakes chills or night sweats.    MEDICAL HISTORY REVIEW  Reviewed in EPIC    PAST MEDICAL HISTORY  Active Ambulatory Problems     Diagnosis Date Noted    Chronic indwelling Holland catheter 10/12/2024    Dementia 10/12/2024    Parkinson disease 10/12/2024    Type 2 diabetes mellitus with hyperglycemia, with long-term current use of insulin 10/13/2024    Acute metabolic encephalopathy 10/13/2024     Resolved Ambulatory Problems     Diagnosis Date Noted    Urinary tract infection in male 10/12/2024     Past Medical History:   Diagnosis Date    Age-related osteoporosis without current pathological fracture     Atherosclerotic heart disease of native coronary artery without angina pectoris     Essential (primary) hypertension     Irritant contact dermatitis due to fecal, urinary or dual incontinence     Long term (current) use of insulin     Mixed hyperlipidemia     Other cervical disc degeneration, unspecified cervical region     Other intervertebral disc degeneration, 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     Other obstructive and reflux uropathy     Other specified anxiety disorders      Parkinson's disease without dyskinesia, without mention of fluctuations     Personal history of malignant neoplasm of prostate     Type 2 diabetes mellitus with hyperglycemia     Unspecified dementia, unspecified severity, without behavioral disturbance, psychotic disturbance, mood disturbance, and anxiety     Vitamin D deficiency, unspecified        PAST SURGICAL HISTORY  No past surgical history on file.    FAMILY HISTORY  No family history on file.    SOCIAL HISTORY  Social History     Socioeconomic History    Marital status:    Tobacco Use    Smoking status: Never     Passive exposure: Never    Smokeless tobacco: Never   Vaping Use    Vaping status: Never Used   Substance and Sexual Activity    Alcohol use: Never    Drug use: Not Currently       ALLERGIES  Patient has no known allergies.    The patient's allergies have been reviewed    REVIEW OF SYSTEMS  All systems reviewed and negative except for those discussed in HPI.     PHYSICAL EXAM  I have reviewed the triage vital signs and nursing notes.  ED Triage Vitals   Temp Heart Rate Resp BP SpO2   02/20/25 0926 02/20/25 0925 02/20/25 0925 02/20/25 0926 02/20/25 0925   97.1 °F (36.2 °C) 75 18 114/69 95 %      Temp src Heart Rate Source Patient Position BP Location FiO2 (%)   -- -- -- -- --              General: No acute distress.  HENT: NCAT, PERRL, Nares patent.  Eyes: no scleral icterus.  Neck: trachea midline, no ROM limitations.  CV: regular rhythm, regular rate.  Respiratory: normal effort, CTAB.  Abdomen: soft, nondistended, NTTP, no rebound tenderness, no guarding or rigidity.  Musculoskeletal: no deformity.  Neuro: alert, moves all extremities, follows commands.  Skin: warm, dry.    LAB RESULTS  Recent Results (from the past 24 hours)   Comprehensive Metabolic Panel    Collection Time: 02/20/25  9:44 AM    Specimen: Arm, Left; Blood   Result Value Ref Range    Glucose 156 (H) 65 - 99 mg/dL    BUN 27 (H) 8 - 23 mg/dL    Creatinine 1.43 (H) 0.76 -  1.27 mg/dL    Sodium 141 136 - 145 mmol/L    Potassium 4.9 3.5 - 5.2 mmol/L    Chloride 105 98 - 107 mmol/L    CO2 26.4 22.0 - 29.0 mmol/L    Calcium 9.0 8.6 - 10.5 mg/dL    Total Protein 6.7 6.0 - 8.5 g/dL    Albumin 3.4 (L) 3.5 - 5.2 g/dL    ALT (SGPT) <5 1 - 41 U/L    AST (SGOT) 9 1 - 40 U/L    Alkaline Phosphatase 107 39 - 117 U/L    Total Bilirubin 0.5 0.0 - 1.2 mg/dL    Globulin 3.3 gm/dL    A/G Ratio 1.0 g/dL    BUN/Creatinine Ratio 18.9 7.0 - 25.0    Anion Gap 9.6 5.0 - 15.0 mmol/L    eGFR 52.1 (L) >60.0 mL/min/1.73   Lipase    Collection Time: 02/20/25  9:44 AM    Specimen: Arm, Left; Blood   Result Value Ref Range    Lipase 18 13 - 60 U/L   Lactic Acid, Plasma    Collection Time: 02/20/25  9:44 AM    Specimen: Arm, Left; Blood   Result Value Ref Range    Lactate 1.9 0.5 - 2.0 mmol/L   Green Top (Gel)    Collection Time: 02/20/25  9:44 AM   Result Value Ref Range    Extra Tube Hold for add-ons.    Lavender Top    Collection Time: 02/20/25  9:44 AM   Result Value Ref Range    Extra Tube hold for add-on    Gold Top - SST    Collection Time: 02/20/25  9:44 AM   Result Value Ref Range    Extra Tube Hold for add-ons.    Light Blue Top    Collection Time: 02/20/25  9:44 AM   Result Value Ref Range    Extra Tube Hold for add-ons.    CBC Auto Differential    Collection Time: 02/20/25  9:44 AM    Specimen: Arm, Left; Blood   Result Value Ref Range    WBC 11.01 (H) 3.40 - 10.80 10*3/mm3    RBC 4.47 4.14 - 5.80 10*6/mm3    Hemoglobin 13.2 13.0 - 17.7 g/dL    Hematocrit 40.7 37.5 - 51.0 %    MCV 91.1 79.0 - 97.0 fL    MCH 29.5 26.6 - 33.0 pg    MCHC 32.4 31.5 - 35.7 g/dL    RDW 13.3 12.3 - 15.4 %    RDW-SD 44.8 37.0 - 54.0 fl    MPV 9.3 6.0 - 12.0 fL    Platelets 196 140 - 450 10*3/mm3    Neutrophil % 77.1 (H) 42.7 - 76.0 %    Lymphocyte % 12.7 (L) 19.6 - 45.3 %    Monocyte % 8.9 5.0 - 12.0 %    Eosinophil % 0.5 0.3 - 6.2 %    Basophil % 0.3 0.0 - 1.5 %    Immature Grans % 0.5 0.0 - 0.5 %    Neutrophils, Absolute 8.50  (H) 1.70 - 7.00 10*3/mm3    Lymphocytes, Absolute 1.40 0.70 - 3.10 10*3/mm3    Monocytes, Absolute 0.98 (H) 0.10 - 0.90 10*3/mm3    Eosinophils, Absolute 0.05 0.00 - 0.40 10*3/mm3    Basophils, Absolute 0.03 0.00 - 0.20 10*3/mm3    Immature Grans, Absolute 0.05 0.00 - 0.05 10*3/mm3    nRBC 0.0 0.0 - 0.2 /100 WBC   Urinalysis With Microscopic If Indicated (No Culture) - Urine, Clean Catch    Collection Time: 02/20/25 11:25 AM    Specimen: Urine, Clean Catch   Result Value Ref Range    Color, UA Yellow Yellow, Straw    Appearance, UA Clear Clear    pH, UA 6.0 5.0 - 8.0    Specific Gravity, UA 1.023 1.005 - 1.030    Glucose,  mg/dL (1+) (A) Negative    Ketones, UA Trace (A) Negative    Bilirubin, UA Negative Negative    Blood, UA Negative Negative    Protein, UA Trace (A) Negative    Leuk Esterase, UA Moderate (2+) (A) Negative    Nitrite, UA Positive (A) Negative    Urobilinogen, UA 0.2 E.U./dL 0.2 - 1.0 E.U./dL   Urinalysis, Microscopic Only - Urine, Clean Catch    Collection Time: 02/20/25 11:25 AM    Specimen: Urine, Clean Catch   Result Value Ref Range    RBC, UA 0-2 None Seen, 0-2 /HPF    WBC, UA Too Numerous to Count (A) None Seen, 0-2 /HPF    Bacteria, UA 4+ (A) None Seen /HPF    Squamous Epithelial Cells, UA 0-2 None Seen, 0-2 /HPF    Hyaline Casts, UA 0-2 None Seen /LPF    Methodology Automated Microscopy        I ordered the above labs and reviewed the results.    RADIOLOGY  CT Abdomen Pelvis With Contrast    Result Date: 2/20/2025  CT ABDOMEN PELVIS W CONTRAST-  HISTORY: 74 years of age, Male.  Lower abdominal pain with nausea and constipation  TECHNIQUE:  CT includes axial imaging from the lung bases to the trochanters with intravenous contrast and without use of oral contrast. Data reconstructed in coronal and sagittal planes. Radiation dose reduction techniques were utilized, including automated exposure control and exposure modulation based on body size.  COMPARISON: None.  FINDINGS: Linear right  lower lobe atelectasis. Within the superior aspect medial segment left lobe of the liver there is a 9 mm low-density lesion that is most likely a cyst. Gallbladder, spleen, adrenal glands appear within normal limits. There is a cystic lesion involving the pancreatic tail and this measures approximately 6.7 cm in length x 2.6 cm transverse. Pancreas otherwise appears within normal limits. Small exophytic renal cysts. No hydronephrosis.  Colonic diverticulosis greatest involving the sigmoid colon. No evidence for diverticulitis. Rectum is mildly distended with stool measuring 6 cm in transverse dimension. Urinary bladder mostly decompressed. No ascites or intra-abdominal abscess formation. No evidence for elly enlargement in the abdomen or pelvis.  Previous posterolateral instrumented fusion with placement of rods and pedicle screws at F9-Q3-E0-L5-S1. Interbody spacers at L3-4, L4-5, and L5-S1. Dextroscoliotic curvature lumbar spine with multilevel degenerative disc disease.      1. Cystic lesion within the pancreatic tail measuring approximately 6.7 cm in length x 2.6 cm transverse. This could represent an IPMN, intraductal papillary mucinous neoplasm though imaging findings not entirely specific. There are no previous studies for comparison. Consider further evaluation with pancreatic protocol MRI. 2. Mild rectal distention with stool measuring 6 cm diameter. Colonic diverticulosis without evidence for diverticulitis.  Radiation dose reduction techniques were utilized, including automated exposure control and exposure modulation based on body size.   This report was finalized on 2/20/2025 2:26 PM by Spike Posadas M.D on Workstation: [id number]       I ordered the above noted radiological studies. I reviewed the images and results. I agree with the radiologist interpretation.    PROCEDURES  Procedures    MEDICATIONS GIVEN IN ER  Medications   sodium chloride 0.9 % flush 10 mL (has no administration in time  range)   sodium chloride 0.9 % bolus 1,000 mL (1,000 mL Intravenous New Bag 2/20/25 1303)   cefTRIAXone (ROCEPHIN) 1,000 mg in sodium chloride 0.9 % 100 mL MBP (0 mg Intravenous Stopped 2/20/25 1344)   iopamidol (ISOVUE-300) 61 % injection 100 mL (85 mL Intravenous Given 2/20/25 1357)       PROGRESS, DATA ANALYSIS, CONSULTS, AND MEDICAL DECISION MAKING  A complete history and physical exam have been performed.  All available laboratory and imaging results have been reviewed by myself prior to disposition.    MDM    After the initial H&P, I discussed pertinent information from history and physical exam with patient/family.  Discussed differential diagnosis.  Discussed plan for ED evaluation/workup/treatment.  All questions answered.  Patient/family is agreeable with plan.  ED Course as of 02/20/25 1525   Thu Feb 20, 2025   1243 My differential diagnosis for abdominal pain for a male includes but is not limited to:  Gastritis, gastroenteritis, peptic ulcer disease, GERD, esophageal perforation, acute appendicitis, mesenteric adenitis, Meckel's diverticulum, epiploic appendagitis, diverticulitis, colon cancer, ulcerative colitis, Crohn's disease, intussusception, small bowel obstruction, adhesions, ischemic bowel, perforated viscus, ileus, obstipation, biliary colic, cholecystitis, cholelithiasis, hepatitis, pancreatitis, common bile duct obstruction, cholangitis, bile leak, splenic trauma, splenic rupture, splenic infarction, splenic abscess, abdominal abscess, ascites, spontaneous bacterial peritonitis, hernia, UTI, cystitis, prostatitis, ureterolithiasis, urinary obstruction, testicular torsion, AAA, myocardial infarction, pneumonia, cancer, porphyria, DKA, medications, sickle cell, viral syndrome, zoster   [JG]   1427 CT abdomen pelvis in PACS, no bowel obstruction per my read. [JG]   1429 Patient noted to have constipation with rectal distention.  Diverticulosis but no diverticulitis.  Giving soapsuds enema. [JG]    1429 Patient noted to have cystic lesion in pancreatic tail.  Discussed need for further evaluation with MRI imaging as outpatient. [JG]   1505 Creatinine at baseline, 1.43 here today, was 1.54 four months ago. [JG]      ED Course User Index  [JG] Hever Chase MD       AS OF 15:25 EST VITALS:    BP - 141/76  HR - 73  TEMP - 97.1 °F (36.2 °C)  O2 SATS - 96%    DIAGNOSIS  Final diagnoses:   Acute UTI   Constipation, unspecified constipation type   Chronic kidney disease, unspecified CKD stage   Hyperglycemia   Pancreatic cyst   Abnormal CT of the abdomen         DISPOSITION  DISCHARGE    Patient discharged in stable condition.    Reviewed implications of results, diagnosis, meds, responsibility to follow up, warning signs and symptoms of possible worsening, potential complications and reasons to return to ER.    Patient/Family voiced understanding of above instructions.    Discussed plan for discharge, as there is no emergent indication for admission. Patient referred to primary care provider for BP management due to today's BP. Pt/family is agreeable and understands need for follow up and repeat testing.  Pt is aware that discharge does not mean that nothing is wrong but it indicates no emergency is present that requires admission and they must continue care with follow-up as given below or physician of their choice.     FOLLOW-UP  aSlomon Mcknight MD  [address] [place] [address]  [phone]    Schedule an appointment as soon as possible for a visit in 2 days  even if well, To arrange for outpatient MRI for further evaluation of pancreatic cyst         Medication List        New Prescriptions      bisacodyl 10 MG suppository  Commonly known as: Dulcolax  Insert 1 suppository into the rectum Daily As Needed for Constipation.     cephalexin 500 MG capsule  Commonly known as: KEFLEX  Take 1 capsule by mouth 4 (Four) Times a Day for 5 days.     polyethylene glycol 17 GM/SCOOP powder  Commonly known  as: MIRALAX  Take 17 g by mouth Daily.     sennosides-docusate 8.6-50 MG per tablet  Commonly known as: PERICOLACE  Take 1 tablet by mouth Daily for 30 days.               Where to Get Your Medications        These medications were sent to [facility] [place] [id number]      Hours: Monday to Friday 7 AM to 6 PM, Saturday & Sunday 8 AM to 4:30 PM (Closed 12 PM to 12:30 PM) Phone: [phone]   bisacodyl 10 MG suppository  cephalexin 500 MG capsule  polyethylene glycol 17 GM/SCOOP powder  sennosides-docusate 8.6-50 MG per tablet            Hever Chase MD  02/20/25 2837

## 2025-02-20 NOTE — ED TRIAGE NOTES
Patient to ED via EMS from [facility]. Facility called EMS for LLQ abdominal pain x weeks.

## 2025-02-24 ENCOUNTER — APPOINTMENT (OUTPATIENT)
Dept: CT IMAGING | Facility: HOSPITAL | Age: 75
End: 2025-02-24
Payer: MEDICARE

## 2025-02-24 ENCOUNTER — HOSPITAL ENCOUNTER (EMERGENCY)
Facility: HOSPITAL | Age: 75
Discharge: NURSING, REHAB OR LONG-TERM CARE | End: 2025-02-24
Attending: EMERGENCY MEDICINE | Admitting: EMERGENCY MEDICINE
Payer: MEDICARE

## 2025-02-24 VITALS
WEIGHT: 160 LBS | HEART RATE: 84 BPM | DIASTOLIC BLOOD PRESSURE: 88 MMHG | RESPIRATION RATE: 16 BRPM | OXYGEN SATURATION: 95 % | BODY MASS INDEX: 23.7 KG/M2 | TEMPERATURE: 98 F | HEIGHT: 69 IN | SYSTOLIC BLOOD PRESSURE: 161 MMHG

## 2025-02-24 DIAGNOSIS — R10.9 RIGHT SIDED ABDOMINAL PAIN: Primary | ICD-10-CM

## 2025-02-24 DIAGNOSIS — R93.89 ABNORMAL CT SCAN: ICD-10-CM

## 2025-02-24 LAB
ALBUMIN SERPL-MCNC: 3.9 G/DL (ref 3.5–5.2)
ALBUMIN/GLOB SERPL: 1.3 G/DL
ALP SERPL-CCNC: 113 U/L (ref 39–117)
ALT SERPL W P-5'-P-CCNC: <5 U/L (ref 1–41)
ANION GAP SERPL CALCULATED.3IONS-SCNC: 10.3 MMOL/L (ref 5–15)
AST SERPL-CCNC: 12 U/L (ref 1–40)
BACTERIA UR QL AUTO: NORMAL /HPF
BASOPHILS # BLD AUTO: 0.03 10*3/MM3 (ref 0–0.2)
BASOPHILS NFR BLD AUTO: 0.3 % (ref 0–1.5)
BILIRUB SERPL-MCNC: 0.5 MG/DL (ref 0–1.2)
BILIRUB UR QL STRIP: NEGATIVE
BUN SERPL-MCNC: 24 MG/DL (ref 8–23)
BUN/CREAT SERPL: 18.2 (ref 7–25)
CALCIUM SPEC-SCNC: 9 MG/DL (ref 8.6–10.5)
CHLORIDE SERPL-SCNC: 106 MMOL/L (ref 98–107)
CLARITY UR: CLEAR
CO2 SERPL-SCNC: 23.7 MMOL/L (ref 22–29)
COLOR UR: YELLOW
CREAT SERPL-MCNC: 1.32 MG/DL (ref 0.76–1.27)
D-LACTATE SERPL-SCNC: 1.1 MMOL/L (ref 0.5–2)
DEPRECATED RDW RBC AUTO: 45.5 FL (ref 37–54)
EGFRCR SERPLBLD CKD-EPI 2021: 57.3 ML/MIN/1.73
EOSINOPHIL # BLD AUTO: 0.06 10*3/MM3 (ref 0–0.4)
EOSINOPHIL NFR BLD AUTO: 0.7 % (ref 0.3–6.2)
ERYTHROCYTE [DISTWIDTH] IN BLOOD BY AUTOMATED COUNT: 13.4 % (ref 12.3–15.4)
GLOBULIN UR ELPH-MCNC: 3.1 GM/DL
GLUCOSE BLDC GLUCOMTR-MCNC: 164 MG/DL (ref 70–130)
GLUCOSE SERPL-MCNC: 174 MG/DL (ref 65–99)
GLUCOSE UR STRIP-MCNC: ABNORMAL MG/DL
HCT VFR BLD AUTO: 41.2 % (ref 37.5–51)
HGB BLD-MCNC: 13.8 G/DL (ref 13–17.7)
HGB UR QL STRIP.AUTO: NEGATIVE
HYALINE CASTS UR QL AUTO: NORMAL /LPF
IMM GRANULOCYTES # BLD AUTO: 0.03 10*3/MM3 (ref 0–0.05)
IMM GRANULOCYTES NFR BLD AUTO: 0.3 % (ref 0–0.5)
KETONES UR QL STRIP: ABNORMAL
LEUKOCYTE ESTERASE UR QL STRIP.AUTO: NEGATIVE
LIPASE SERPL-CCNC: 20 U/L (ref 13–60)
LYMPHOCYTES # BLD AUTO: 1.28 10*3/MM3 (ref 0.7–3.1)
LYMPHOCYTES NFR BLD AUTO: 14.6 % (ref 19.6–45.3)
MCH RBC QN AUTO: 30.6 PG (ref 26.6–33)
MCHC RBC AUTO-ENTMCNC: 33.5 G/DL (ref 31.5–35.7)
MCV RBC AUTO: 91.4 FL (ref 79–97)
MONOCYTES # BLD AUTO: 1.24 10*3/MM3 (ref 0.1–0.9)
MONOCYTES NFR BLD AUTO: 14.2 % (ref 5–12)
NEUTROPHILS NFR BLD AUTO: 6.12 10*3/MM3 (ref 1.7–7)
NEUTROPHILS NFR BLD AUTO: 69.9 % (ref 42.7–76)
NITRITE UR QL STRIP: NEGATIVE
NRBC BLD AUTO-RTO: 0 /100 WBC (ref 0–0.2)
PH UR STRIP.AUTO: 5.5 [PH] (ref 5–8)
PLATELET # BLD AUTO: 196 10*3/MM3 (ref 140–450)
PMV BLD AUTO: 9.3 FL (ref 6–12)
POTASSIUM SERPL-SCNC: 4.1 MMOL/L (ref 3.5–5.2)
PROT SERPL-MCNC: 7 G/DL (ref 6–8.5)
PROT UR QL STRIP: ABNORMAL
RBC # BLD AUTO: 4.51 10*6/MM3 (ref 4.14–5.8)
RBC # UR STRIP: NORMAL /HPF
REF LAB TEST METHOD: NORMAL
SODIUM SERPL-SCNC: 140 MMOL/L (ref 136–145)
SP GR UR STRIP: 1.03 (ref 1–1.03)
SQUAMOUS #/AREA URNS HPF: NORMAL /HPF
UROBILINOGEN UR QL STRIP: ABNORMAL
WBC # UR STRIP: NORMAL /HPF
WBC NRBC COR # BLD AUTO: 8.76 10*3/MM3 (ref 3.4–10.8)

## 2025-02-24 PROCEDURE — 25010000002 MORPHINE PER 10 MG: Performed by: EMERGENCY MEDICINE

## 2025-02-24 PROCEDURE — 82948 REAGENT STRIP/BLOOD GLUCOSE: CPT

## 2025-02-24 PROCEDURE — 83690 ASSAY OF LIPASE: CPT | Performed by: EMERGENCY MEDICINE

## 2025-02-24 PROCEDURE — 83605 ASSAY OF LACTIC ACID: CPT | Performed by: EMERGENCY MEDICINE

## 2025-02-24 PROCEDURE — 96374 THER/PROPH/DIAG INJ IV PUSH: CPT

## 2025-02-24 PROCEDURE — 25010000002 ONDANSETRON PER 1 MG: Performed by: EMERGENCY MEDICINE

## 2025-02-24 PROCEDURE — 25510000001 IOPAMIDOL 61 % SOLUTION: Performed by: PHYSICIAN ASSISTANT

## 2025-02-24 PROCEDURE — 74177 CT ABD & PELVIS W/CONTRAST: CPT

## 2025-02-24 PROCEDURE — 81001 URINALYSIS AUTO W/SCOPE: CPT | Performed by: EMERGENCY MEDICINE

## 2025-02-24 PROCEDURE — 99285 EMERGENCY DEPT VISIT HI MDM: CPT

## 2025-02-24 PROCEDURE — 80053 COMPREHEN METABOLIC PANEL: CPT | Performed by: EMERGENCY MEDICINE

## 2025-02-24 PROCEDURE — 96375 TX/PRO/DX INJ NEW DRUG ADDON: CPT

## 2025-02-24 PROCEDURE — 85025 COMPLETE CBC W/AUTO DIFF WBC: CPT | Performed by: EMERGENCY MEDICINE

## 2025-02-24 RX ORDER — ONDANSETRON 2 MG/ML
4 INJECTION INTRAMUSCULAR; INTRAVENOUS ONCE
Status: COMPLETED | OUTPATIENT
Start: 2025-02-24 | End: 2025-02-24

## 2025-02-24 RX ORDER — CARBIDOPA AND LEVODOPA 25; 100 MG/1; MG/1
1.5 TABLET ORAL 4 TIMES DAILY
Status: DISCONTINUED | OUTPATIENT
Start: 2025-02-24 | End: 2025-02-24 | Stop reason: HOSPADM

## 2025-02-24 RX ORDER — SODIUM CHLORIDE 0.9 % (FLUSH) 0.9 %
10 SYRINGE (ML) INJECTION AS NEEDED
Status: DISCONTINUED | OUTPATIENT
Start: 2025-02-24 | End: 2025-02-24 | Stop reason: HOSPADM

## 2025-02-24 RX ORDER — IOPAMIDOL 612 MG/ML
100 INJECTION, SOLUTION INTRAVASCULAR
Status: COMPLETED | OUTPATIENT
Start: 2025-02-24 | End: 2025-02-24

## 2025-02-24 RX ORDER — MORPHINE SULFATE 2 MG/ML
4 INJECTION, SOLUTION INTRAMUSCULAR; INTRAVENOUS ONCE
Status: COMPLETED | OUTPATIENT
Start: 2025-02-24 | End: 2025-02-24

## 2025-02-24 RX ORDER — DICYCLOMINE HCL 20 MG
20 TABLET ORAL EVERY 6 HOURS
Qty: 20 TABLET | Refills: 0 | Status: SHIPPED | OUTPATIENT
Start: 2025-02-24

## 2025-02-24 RX ADMIN — MORPHINE SULFATE 4 MG: 2 INJECTION, SOLUTION INTRAMUSCULAR; INTRAVENOUS at 06:25

## 2025-02-24 RX ADMIN — ONDANSETRON 4 MG: 2 INJECTION, SOLUTION INTRAMUSCULAR; INTRAVENOUS at 06:27

## 2025-02-24 RX ADMIN — IOPAMIDOL 100 ML: 612 INJECTION, SOLUTION INTRAVENOUS at 07:02

## 2025-02-24 NOTE — ED PROVIDER NOTES
EMERGENCY DEPARTMENT ENCOUNTER      Room Number:  26/26  PCP: Salomon Mcknight MD  Independent Historians: Patient  Patient Care Team:  Salomon Mcknight MD as PCP - General (Internal Medicine)       HPI:  Chief Complaint: Abd pain    A complete HPI/ROS/PMH/PSH/SH/FH are unobtainable due to: None    Chronic or social conditions impacting patient care (Social Determinants of Health): None      Context: Singh Hatch is a 74 y.o. male with a PMH significant for dementia, Parkinson's disease, type 2 diabetes, hyperlipidemia who presents to the ED c/o acute right-sided abdominal pain.  Symptoms have been present for the past 1 week and seem to be progressively worsening since onset.  He has associated constipation and tells me that he was here earlier this week for similar symptoms and after an enema was feeling somewhat better and was discharged home.  He started feeling unwell the following day once again.  No development of fever, chills, nausea, vomiting.  No prior abdominal surgeries.  No recent antibiotics.      Upon review of prior external notes (non-ED) -and- Review of prior external test results outside of this encounter it appears the patient was evaluated in the office with internal medicine for annual physical exam on 9/20/2023.  The patient had a normal vitamin B12 and TSH on 10/14/2024.      PAST MEDICAL HISTORY  Active Ambulatory Problems     Diagnosis Date Noted    Chronic indwelling Holland catheter 10/12/2024    Dementia 10/12/2024    Parkinson disease 10/12/2024    Type 2 diabetes mellitus with hyperglycemia, with long-term current use of insulin 10/13/2024    Acute metabolic encephalopathy 10/13/2024     Resolved Ambulatory Problems     Diagnosis Date Noted    Urinary tract infection in male 10/12/2024     Past Medical History:   Diagnosis Date    Age-related osteoporosis without current pathological fracture     Atherosclerotic heart disease of native coronary artery without angina pectoris      Essential (primary) hypertension     Irritant contact dermatitis due to fecal, urinary or dual incontinence     Long term (current) use of insulin     Mixed hyperlipidemia     Other cervical disc degeneration, unspecified cervical region     Other intervertebral disc degeneration, 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     Other obstructive and reflux uropathy     Other specified anxiety disorders     Parkinson's disease without dyskinesia, without mention of fluctuations     Personal history of malignant neoplasm of prostate     Type 2 diabetes mellitus with hyperglycemia     Unspecified dementia, unspecified severity, without behavioral disturbance, psychotic disturbance, mood disturbance, and anxiety     Vitamin D deficiency, unspecified          PAST SURGICAL HISTORY  No past surgical history on file.      FAMILY HISTORY  No family history on file.      SOCIAL HISTORY  Social History     Socioeconomic History    Marital status:    Tobacco Use    Smoking status: Never     Passive exposure: Never    Smokeless tobacco: Never   Vaping Use    Vaping status: Never Used   Substance and Sexual Activity    Alcohol use: Never    Drug use: Not Currently         ALLERGIES  Patient has no known allergies.      REVIEW OF SYSTEMS  Included in HPI  All systems reviewed and negative except for those discussed in HPI.      PHYSICAL EXAM    I have reviewed the triage vital signs and nursing notes.    ED Triage Vitals [02/24/25 0513]   Temp Heart Rate Resp BP SpO2   98 °F (36.7 °C) 82 15 132/74 95 %      Temp src Heart Rate Source Patient Position BP Location FiO2 (%)   Oral -- -- -- --       Physical Exam  Constitutional:       General: He is not in acute distress.     Appearance: He is well-developed. He is ill-appearing (Chronically).   HENT:      Head: Normocephalic and atraumatic.   Eyes:      General: No scleral icterus.     Conjunctiva/sclera: Conjunctivae normal.   Neck:      Trachea: No tracheal deviation.    Cardiovascular:      Rate and Rhythm: Normal rate and regular rhythm.   Pulmonary:      Effort: Pulmonary effort is normal.      Breath sounds: Normal breath sounds.   Abdominal:      Palpations: Abdomen is soft.      Tenderness: There is abdominal tenderness in the right lower quadrant. There is no guarding.   Musculoskeletal:         General: No deformity.      Cervical back: Normal range of motion.   Lymphadenopathy:      Cervical: No cervical adenopathy.   Skin:     General: Skin is warm and dry.   Neurological:      Mental Status: He is alert and oriented to person, place, and time.   Psychiatric:         Behavior: Behavior normal.         Vital signs and nursing notes reviewed.      PPE: I wore a surgical mask throughout my encounters with the pt. I performed hand hygiene on entry into the pt room and upon exit.     LAB RESULTS  Recent Results (from the past 24 hours)   Comprehensive Metabolic Panel    Collection Time: 02/24/25  5:54 AM    Specimen: Blood   Result Value Ref Range    Glucose 174 (H) 65 - 99 mg/dL    BUN 24 (H) 8 - 23 mg/dL    Creatinine 1.32 (H) 0.76 - 1.27 mg/dL    Sodium 140 136 - 145 mmol/L    Potassium 4.1 3.5 - 5.2 mmol/L    Chloride 106 98 - 107 mmol/L    CO2 23.7 22.0 - 29.0 mmol/L    Calcium 9.0 8.6 - 10.5 mg/dL    Total Protein 7.0 6.0 - 8.5 g/dL    Albumin 3.9 3.5 - 5.2 g/dL    ALT (SGPT) <5 1 - 41 U/L    AST (SGOT) 12 1 - 40 U/L    Alkaline Phosphatase 113 39 - 117 U/L    Total Bilirubin 0.5 0.0 - 1.2 mg/dL    Globulin 3.1 gm/dL    A/G Ratio 1.3 g/dL    BUN/Creatinine Ratio 18.2 7.0 - 25.0    Anion Gap 10.3 5.0 - 15.0 mmol/L    eGFR 57.3 (L) >60.0 mL/min/1.73   Lipase    Collection Time: 02/24/25  5:54 AM    Specimen: Blood   Result Value Ref Range    Lipase 20 13 - 60 U/L   Lactic Acid, Plasma    Collection Time: 02/24/25  5:54 AM    Specimen: Blood   Result Value Ref Range    Lactate 1.1 0.5 - 2.0 mmol/L   CBC Auto Differential    Collection Time: 02/24/25  5:54 AM    Specimen:  Blood   Result Value Ref Range    WBC 8.76 3.40 - 10.80 10*3/mm3    RBC 4.51 4.14 - 5.80 10*6/mm3    Hemoglobin 13.8 13.0 - 17.7 g/dL    Hematocrit 41.2 37.5 - 51.0 %    MCV 91.4 79.0 - 97.0 fL    MCH 30.6 26.6 - 33.0 pg    MCHC 33.5 31.5 - 35.7 g/dL    RDW 13.4 12.3 - 15.4 %    RDW-SD 45.5 37.0 - 54.0 fl    MPV 9.3 6.0 - 12.0 fL    Platelets 196 140 - 450 10*3/mm3    Neutrophil % 69.9 42.7 - 76.0 %    Lymphocyte % 14.6 (L) 19.6 - 45.3 %    Monocyte % 14.2 (H) 5.0 - 12.0 %    Eosinophil % 0.7 0.3 - 6.2 %    Basophil % 0.3 0.0 - 1.5 %    Immature Grans % 0.3 0.0 - 0.5 %    Neutrophils, Absolute 6.12 1.70 - 7.00 10*3/mm3    Lymphocytes, Absolute 1.28 0.70 - 3.10 10*3/mm3    Monocytes, Absolute 1.24 (H) 0.10 - 0.90 10*3/mm3    Eosinophils, Absolute 0.06 0.00 - 0.40 10*3/mm3    Basophils, Absolute 0.03 0.00 - 0.20 10*3/mm3    Immature Grans, Absolute 0.03 0.00 - 0.05 10*3/mm3    nRBC 0.0 0.0 - 0.2 /100 WBC   Urinalysis With Microscopic If Indicated (No Culture) - Urine, Clean Catch    Collection Time: 02/24/25  6:11 AM    Specimen: Urine, Clean Catch   Result Value Ref Range    Color, UA Yellow Yellow, Straw    Appearance, UA Clear Clear    pH, UA 5.5 5.0 - 8.0    Specific Gravity, UA 1.030 1.005 - 1.030    Glucose,  mg/dL (1+) (A) Negative    Ketones, UA 15 mg/dL (1+) (A) Negative    Bilirubin, UA Negative Negative    Blood, UA Negative Negative    Protein, UA 30 mg/dL (1+) (A) Negative    Leuk Esterase, UA Negative Negative    Nitrite, UA Negative Negative    Urobilinogen, UA 1.0 E.U./dL 0.2 - 1.0 E.U./dL   Urinalysis, Microscopic Only - Urine, Clean Catch    Collection Time: 02/24/25  6:11 AM    Specimen: Urine, Clean Catch   Result Value Ref Range    RBC, UA 0-2 None Seen, 0-2 /HPF    WBC, UA 0-2 None Seen, 0-2 /HPF    Bacteria, UA None Seen None Seen /HPF    Squamous Epithelial Cells, UA 0-2 None Seen, 0-2 /HPF    Hyaline Casts, UA 3-6 None Seen /LPF    Methodology Automated Microscopy          RADIOLOGY  CT  Abdomen Pelvis With Contrast    Result Date: 2/24/2025  CT ABDOMEN PELVIS W CONTRAST-  DATE OF EXAM: 2/24/2025 6:48 AM  INDICATION: Right lower quadrant abdominal pain for the past 1 week, constipation, concern for bowel obstruction.  COMPARISON: 2/20/2025.  TECHNIQUE: Multiple contiguous axial images were acquired through the abdomen and pelvis following the intravenous administration of 100 mL of Isovue-300. Reformatted coronal and sagittal sequences were also reviewed. Radiation dose reduction techniques were utilized, including automated exposure control and exposure modulation based on body size.  FINDINGS: Partially imaged bibasilar dependent atelectasis and/or scarring with patchy groundglass opacities in the base of each lower lobe that could also represent atelectasis or superimposed atypical pneumonia.  Stable incompletely evaluated subcentimeter low-density lesion in the left lobe of the liver, statistically representing a benign cyst or hemangioma. The gallbladder and spleen are unremarkable. Similar-appearing large multilobulated cystic lesion in the pancreatic tail, which is difficult to measure due to its irregular shape but not significantly changed from the prior CT study from 4 days ago. The adrenal glands are unremarkable. Similar-appearing incompletely evaluated low-density lesions in both kidneys, probably benign cysts. The urinary bladder is nondistended. Presumed postoperative changes from prostatectomy.  Tiny hiatal hernia. Diffuse gastric wall thickening, which could be accentuated by underdistention. Mild colonic stool. Colonic diverticula, without CT evidence of diverticulitis. No bowel obstruction. The appendix is normal.  No free fluid in the abdomen or pelvis. No free intraperitoneal air. No pathologically enlarged lymph nodes in the abdomen or pelvis. Mild calcified atherosclerotic disease in the abdominal aorta and its distal branches without aneurysm. Similar-appearing postoperative  changes in the lumbosacral spine. The right L2 pedicle screw extends through the L2 superior endplate into the L1-L2 disc space. Partially imaged old healed bilateral rib fracture deformities. Mild to moderate bilateral hip joint DJD. Stable sclerotic focus in the left pubic body. No acute osseous abnormality is identified.       1. Tiny hiatal hernia with gastric wall thickening that could be accentuated by underdistention but could reflect a nonspecific gastritis. 2. Colonic diverticula, without CT evidence of diverticulitis. No the bowel obstruction. 3. Similar-appearing large multilobulated cystic lesion in the tail of the pancreas. 4. Stable sclerotic focus in the left pubic body, possible benign bone island recommend correlating with PSA. 5. Partially imaged multifocal bibasilar subsegmental atelectasis and/or scarring with groundglass opacities in the base of each lower lobe that could represent atelectasis or superimposed pneumonia.  This report was finalized on 2/24/2025 7:42 AM by Matt Perez MD on Workstation: [id number]         MEDICATIONS GIVEN IN ER  Medications   sodium chloride 0.9 % flush 10 mL (has no administration in time range)   morphine injection 4 mg (4 mg Intravenous Given 2/24/25 0625)   ondansetron (ZOFRAN) injection 4 mg (4 mg Intravenous Given 2/24/25 0627)   iopamidol (ISOVUE-300) 61 % injection 100 mL (100 mL Intravenous Given by Other 2/24/25 0702)         ORDERS PLACED DURING THIS VISIT:  Orders Placed This Encounter   Procedures    CT Abdomen Pelvis With Contrast    Comprehensive Metabolic Panel    Lipase    Urinalysis With Microscopic If Indicated (No Culture) - Urine, Clean Catch    Lactic Acid, Plasma    CBC Auto Differential    Urinalysis, Microscopic Only - Urine, Clean Catch    Monitor Blood Pressure    Continuous Pulse Oximetry    Insert Peripheral IV    CBC & Differential         OUTPATIENT MEDICATION MANAGEMENT:  Current Facility-Administered Medications Ordered in Epic    Medication Dose Route Frequency Provider Last Rate Last Admin    sodium chloride 0.9 % flush 10 mL  10 mL Intravenous PRN Haider Soto MD         Current Outpatient Medications Ordered in Epic   Medication Sig Dispense Refill    aspirin 81 MG EC tablet Take 1 tablet by mouth Daily.      bisacodyl (Dulcolax) 10 MG suppository Insert 1 suppository into the rectum Daily As Needed for Constipation. 28 suppository 0    carbidopa-levodopa (SINEMET)  MG per tablet Take 1.5 tablets by mouth 4 (Four) Times a Day.      carbidopa-levodopa CR (SINEMET CR)  MG per CR tablet Take 1 tablet by mouth Every Night. Give at 2300      cephalexin (KEFLEX) 500 MG capsule Take 1 capsule by mouth 4 (Four) Times a Day for 5 days. 20 capsule 0    dextrose (GLUTOSE) 40 % gel Take 15 g by mouth Every 1 (One) Hour As Needed for Low Blood Sugar.      dicyclomine (BENTYL) 20 MG tablet Take 1 tablet by mouth Every 6 (Six) Hours. 20 tablet 0    glucagon, human recombinant, (GLUCAGEN DIAGNOSTIC) 1 MG injection Infuse 1 mg into a venous catheter 1 (One) Time.      insulin glargine (LANTUS, SEMGLEE) 100 UNIT/ML injection Inject 40 Units under the skin into the appropriate area as directed Every Night.      Insulin Lispro (humaLOG) 100 UNIT/ML injection Inject 2-10 Units under the skin into the appropriate area as directed 3 (Three) Times a Day Before Meals. -199=2 unit,200-249=4 unit,250-299=6 unit,300-349=8 unit,350-400=10 unit,>400= call MD      miconazole (MICOTIN) 2 % cream Apply 1 Application topically to the appropriate area as directed 2 (Two) Times a Day. Clean scrotum with soap and water and apply miconazole to area r/t yeast      mirtazapine (REMERON SOL-TAB) 15 MG disintegrating tablet Place 1 tablet on the tongue Every Night.      polyethylene glycol (MIRALAX) 17 GM/SCOOP powder Mix one capful (17 g) in liquid and take by mouth Daily. 510 g 0    sennosides-docusate (senna-docusate sodium) 8.6-50 MG per tablet  Take 1 tablet by mouth Daily for 30 days. 30 tablet 0    vitamin B-12 (CYANOCOBALAMIN) 1000 MCG tablet Take 1 tablet by mouth Daily.      vitamin D (ERGOCALCIFEROL) 1.25 MG (24697 UT) capsule capsule Take 1 capsule by mouth 1 (One) Time Per Week.             PROGRESS, DATA ANALYSIS, CONSULTS, AND MEDICAL DECISION MAKING  All labs have been independently interpreted by me.  All radiology studies have been reviewed by me. All EKG's have been independently viewed and interpreted by me.  Discussion below represents my analysis of pertinent findings related to patient's condition, differential diagnosis, treatment plan and final disposition.    Patient presentation and evaluation most consistent with right-sided abdominal pain from unclear etiology.  His workup is very reassuring in regards to labs and imaging and his symptoms are much improved.  Tolerates p.o. intake.  Plan for outpatient management with continued supportive care with Bentyl and close PCP follow-up.  Patient understands that he should have a low threshold for returning if symptoms worsen as well.  He is agreeable with this plan and all questions have been answered.    DIFFERENTIAL DIAGNOSIS INCLUDE BUT NOT LIMITED TO:     Differential diagnosis includes but is not limited to:  - Hepatobiliary pathology such as cholecystitis, cholangitis, and symptomatic cholelithiasis  - Pancreatitis  - Dyspepsia  - Small bowel obstruction  - Appendicitis  - Diverticulitis  - UTI including pyelonephritis  - Ureteral stone  - Zoster  - Colitis, including infectious and ischemic  - Atypical ACS      Clinical Scores: N/A      ED Course as of 02/24/25 0903   Mon Feb 24, 2025   0653 WBC: 8.76 [DC]   0653 Neutrophil Rel %: 69.9 [DC]   0653 Lipase: 20 [DC]      ED Course User Index  [DC] Mynor Sosa, PA         0903 I rechecked the patient.  I discussed the patient's labs, radiology findings (including all incidental findings), diagnosis, and plan for discharge.  A repeat  exam reveals no new worrisome changes from my initial exam findings.  The patient understands that the fact that they are being discharged does not denote that nothing is abnormal, it indicates that no clinical emergency is present and that they must follow-up as directed in order to properly maintain their health.  Follow-up instructions (specifically listed below) and return to ER precautions were given at this time.  I specifically instructed the patient to follow-up with their PCP.  The patient understands and agrees with the plan, and is ready for discharge.  All questions answered.         AS OF 09:03 EST VITALS:    BP - 145/82  HR - 66  TEMP - 98 °F (36.7 °C) (Oral)  O2 SATS - 93%    COMPLEXITY OF CARE  Admission was considered but after careful review of the patient's presentation, physical examination, diagnostic results, and response to treatment the patient may be safely discharged with outpatient follow-up.      DIAGNOSIS  Final diagnoses:   Right sided abdominal pain   Abnormal CT scan         DISPOSITION  ED Disposition       ED Disposition   Discharge    Condition   Stable    Comment   --                Please note that portions of this document were completed with a voice recognition program.    Note Disclaimer: At [facility], we believe that sharing information builds trust and better relationships. You are receiving this note because you recently visited [facility]. It is possible you will see health information before a provider has talked with you about it. This kind of information can be easy to misunderstand. To help you fully understand what it means for your health, we urge you to discuss this note with your provider.         Mynor Sosa PA  02/24/25 0480

## 2025-02-24 NOTE — ED PROVIDER NOTES
MD ATTESTATION NOTE     SHARED VISIT: This visit was performed by BOTH a physician and an APC. The substantive portion of the medical decision making was performed by this attesting physician who made or approved the management plan and takes responsibility for patient management. All studies in the APC note (if performed) were independently interpreted by me.  The YAZMIN and I have discussed this patient's history, physical exam, and treatment plan. I have reviewed the documentation and personally had a face to face interaction with the patient. I affirm the documentation and agree with the treatment and plan. I provided a substantive portion of the care of the patient.  I personally performed the physical exam in its entirety, and below are my findings.      Brief HPI: Patient complains of acute right sided abdominal pain over the past week.  He has had some constipation.  Denies fever, chills, vomiting, chest pain, shortness of breath, or dysuria.  He was seen here in the ED 4 days ago for abdominal pain.  He was found to have constipation was given an enema.    PHYSICAL EXAM    GENERAL: Awake and alert.  Chronically ill but nontoxic-appearing elderly male.  Resting comfortably in no acute distress  HENT: nares patent  EYES: no scleral icterus  CV: regular rhythm, normal rate  RESPIRATORY: normal effort, CTAB  ABDOMEN: soft, nondistended, there is right lower quadrant tenderness without rebound or guarding, no CVA tenderness  MUSCULOSKELETAL: no deformity  NEURO: alert, moves all extremities, follows commands  PSYCH:  calm, cooperative  SKIN: warm, dry    Vital signs and nursing notes reviewed.        Plan: Will obtain labs and CT abdomen/pelvis.  Differential diagnosis includes but is not limited to: Bowel obstruction, bowel perforation, appendicitis, constipation, colitis, diverticulitis    CT abdomen/pelvis personally interpreted by me.  My personal interpretation is: Bowel obstruction.  No obstructive  uropathy.    MDM: Patient presented to the ED complaint of right lower quadrant pain for the past 1 week.  CT scan and labs are unremarkable.  He did not have an acute abdomen.  He will be discharged back to his facility.    ED Course as of 02/24/25 0924 Mon Feb 24, 2025   0653 WBC: 8.76 [DC]   0653 Neutrophil Rel %: 69.9 [DC]   0653 Lipase: 20 [DC]   0855 Creatinine(!): 1.32  1.43 four days ago []      ED Course User Index  [DC] Mynor Sosa PA  [] German Grajeda MD Holland, William D, MD  02/24/25 0924

## 2025-02-24 NOTE — CASE MANAGEMENT/SOCIAL WORK
Discharge Planning Assessment  [facility]     Patient Name: Singh Hatch  MRN: [id number]  Today's Date: 2/24/2025    Admit Date: 2/24/2025        Discharge Needs Assessment    No documentation.                  Discharge Plan       Row Name 02/24/25 1009       Plan    Plan Comments Call placed to [facility] to schedule transport of pt back to facility; spoke w/Samantha who gave an ETA of 1030- Updated primary RN                  Continued Care and Services - Admitted Since 2/24/2025    No active coordination exists for this encounter.          Demographic Summary    No documentation.                  Functional Status    No documentation.                  Psychosocial    No documentation.                  Abuse/Neglect    No documentation.                  Legal    No documentation.                  Substance Abuse    No documentation.                  Patient Forms    No documentation.                     Coleen Valdovinos RN

## 2025-03-04 ENCOUNTER — APPOINTMENT (OUTPATIENT)
Dept: CT IMAGING | Facility: HOSPITAL | Age: 75
End: 2025-03-04
Payer: MEDICARE

## 2025-03-04 ENCOUNTER — HOSPITAL ENCOUNTER (EMERGENCY)
Facility: HOSPITAL | Age: 75
Discharge: NURSING, REHAB OR LONG-TERM CARE | End: 2025-03-04
Attending: STUDENT IN AN ORGANIZED HEALTH CARE EDUCATION/TRAINING PROGRAM | Admitting: STUDENT IN AN ORGANIZED HEALTH CARE EDUCATION/TRAINING PROGRAM
Payer: MEDICARE

## 2025-03-04 VITALS
OXYGEN SATURATION: 93 % | HEART RATE: 79 BPM | DIASTOLIC BLOOD PRESSURE: 87 MMHG | RESPIRATION RATE: 18 BRPM | TEMPERATURE: 98.2 F | SYSTOLIC BLOOD PRESSURE: 135 MMHG

## 2025-03-04 DIAGNOSIS — N30.00 ACUTE CYSTITIS WITHOUT HEMATURIA: ICD-10-CM

## 2025-03-04 DIAGNOSIS — K59.00 CONSTIPATION, UNSPECIFIED CONSTIPATION TYPE: Primary | ICD-10-CM

## 2025-03-04 LAB
ALBUMIN SERPL-MCNC: 3 G/DL (ref 3.5–5.2)
ALBUMIN/GLOB SERPL: 0.9 G/DL
ALP SERPL-CCNC: 103 U/L (ref 39–117)
ALT SERPL W P-5'-P-CCNC: <5 U/L (ref 1–41)
ANION GAP SERPL CALCULATED.3IONS-SCNC: 9.7 MMOL/L (ref 5–15)
AST SERPL-CCNC: 15 U/L (ref 1–40)
BACTERIA UR QL AUTO: ABNORMAL /HPF
BASOPHILS # BLD AUTO: 0.01 10*3/MM3 (ref 0–0.2)
BASOPHILS NFR BLD AUTO: 0.1 % (ref 0–1.5)
BILIRUB SERPL-MCNC: 0.2 MG/DL (ref 0–1.2)
BILIRUB UR QL STRIP: NEGATIVE
BUN SERPL-MCNC: 25 MG/DL (ref 8–23)
BUN/CREAT SERPL: 16.7 (ref 7–25)
CALCIUM SPEC-SCNC: 8.7 MG/DL (ref 8.6–10.5)
CHLORIDE SERPL-SCNC: 110 MMOL/L (ref 98–107)
CLARITY UR: CLEAR
CO2 SERPL-SCNC: 25.3 MMOL/L (ref 22–29)
COLOR UR: YELLOW
CREAT SERPL-MCNC: 1.5 MG/DL (ref 0.76–1.27)
D-LACTATE SERPL-SCNC: 0.9 MMOL/L (ref 0.5–2)
DEPRECATED RDW RBC AUTO: 43.8 FL (ref 37–54)
EGFRCR SERPLBLD CKD-EPI 2021: 49.2 ML/MIN/1.73
EOSINOPHIL # BLD AUTO: 0.06 10*3/MM3 (ref 0–0.4)
EOSINOPHIL NFR BLD AUTO: 0.8 % (ref 0.3–6.2)
ERYTHROCYTE [DISTWIDTH] IN BLOOD BY AUTOMATED COUNT: 13.4 % (ref 12.3–15.4)
GLOBULIN UR ELPH-MCNC: 3.3 GM/DL
GLUCOSE SERPL-MCNC: 151 MG/DL (ref 65–99)
GLUCOSE UR STRIP-MCNC: NEGATIVE MG/DL
HCT VFR BLD AUTO: 37.1 % (ref 37.5–51)
HGB BLD-MCNC: 12.6 G/DL (ref 13–17.7)
HGB UR QL STRIP.AUTO: NEGATIVE
HOLD SPECIMEN: NORMAL
HOLD SPECIMEN: NORMAL
HYALINE CASTS UR QL AUTO: ABNORMAL /LPF
IMM GRANULOCYTES # BLD AUTO: 0.03 10*3/MM3 (ref 0–0.05)
IMM GRANULOCYTES NFR BLD AUTO: 0.4 % (ref 0–0.5)
KETONES UR QL STRIP: ABNORMAL
LEUKOCYTE ESTERASE UR QL STRIP.AUTO: NEGATIVE
LIPASE SERPL-CCNC: 17 U/L (ref 13–60)
LYMPHOCYTES # BLD AUTO: 1.77 10*3/MM3 (ref 0.7–3.1)
LYMPHOCYTES NFR BLD AUTO: 25 % (ref 19.6–45.3)
MCH RBC QN AUTO: 30.3 PG (ref 26.6–33)
MCHC RBC AUTO-ENTMCNC: 34 G/DL (ref 31.5–35.7)
MCV RBC AUTO: 89.2 FL (ref 79–97)
MONOCYTES # BLD AUTO: 0.8 10*3/MM3 (ref 0.1–0.9)
MONOCYTES NFR BLD AUTO: 11.3 % (ref 5–12)
NEUTROPHILS NFR BLD AUTO: 4.42 10*3/MM3 (ref 1.7–7)
NEUTROPHILS NFR BLD AUTO: 62.4 % (ref 42.7–76)
NITRITE UR QL STRIP: POSITIVE
NRBC BLD AUTO-RTO: 0 /100 WBC (ref 0–0.2)
PH UR STRIP.AUTO: 6 [PH] (ref 5–8)
PLATELET # BLD AUTO: 198 10*3/MM3 (ref 140–450)
PMV BLD AUTO: 9.2 FL (ref 6–12)
POTASSIUM SERPL-SCNC: 4.1 MMOL/L (ref 3.5–5.2)
PROT SERPL-MCNC: 6.3 G/DL (ref 6–8.5)
PROT UR QL STRIP: ABNORMAL
RBC # BLD AUTO: 4.16 10*6/MM3 (ref 4.14–5.8)
RBC # UR STRIP: ABNORMAL /HPF
REF LAB TEST METHOD: ABNORMAL
SODIUM SERPL-SCNC: 145 MMOL/L (ref 136–145)
SP GR UR STRIP: >1.03 (ref 1–1.03)
SQUAMOUS #/AREA URNS HPF: ABNORMAL /HPF
UROBILINOGEN UR QL STRIP: ABNORMAL
WBC # UR STRIP: ABNORMAL /HPF
WBC NRBC COR # BLD AUTO: 7.09 10*3/MM3 (ref 3.4–10.8)
WHOLE BLOOD HOLD COAG: NORMAL
WHOLE BLOOD HOLD SPECIMEN: NORMAL

## 2025-03-04 PROCEDURE — 74177 CT ABD & PELVIS W/CONTRAST: CPT

## 2025-03-04 PROCEDURE — 80053 COMPREHEN METABOLIC PANEL: CPT | Performed by: STUDENT IN AN ORGANIZED HEALTH CARE EDUCATION/TRAINING PROGRAM

## 2025-03-04 PROCEDURE — 83690 ASSAY OF LIPASE: CPT

## 2025-03-04 PROCEDURE — 25510000001 IOPAMIDOL 61 % SOLUTION: Performed by: STUDENT IN AN ORGANIZED HEALTH CARE EDUCATION/TRAINING PROGRAM

## 2025-03-04 PROCEDURE — 36415 COLL VENOUS BLD VENIPUNCTURE: CPT

## 2025-03-04 PROCEDURE — 85025 COMPLETE CBC W/AUTO DIFF WBC: CPT

## 2025-03-04 PROCEDURE — 99285 EMERGENCY DEPT VISIT HI MDM: CPT

## 2025-03-04 PROCEDURE — 81001 URINALYSIS AUTO W/SCOPE: CPT | Performed by: STUDENT IN AN ORGANIZED HEALTH CARE EDUCATION/TRAINING PROGRAM

## 2025-03-04 PROCEDURE — 83605 ASSAY OF LACTIC ACID: CPT

## 2025-03-04 RX ORDER — CEPHALEXIN 500 MG/1
500 CAPSULE ORAL ONCE
Status: COMPLETED | OUTPATIENT
Start: 2025-03-04 | End: 2025-03-04

## 2025-03-04 RX ORDER — CEPHALEXIN 500 MG/1
500 CAPSULE ORAL 2 TIMES DAILY
Qty: 14 CAPSULE | Refills: 0 | Status: SHIPPED | OUTPATIENT
Start: 2025-03-04 | End: 2025-03-15 | Stop reason: HOSPADM

## 2025-03-04 RX ORDER — SODIUM CHLORIDE 0.9 % (FLUSH) 0.9 %
10 SYRINGE (ML) INJECTION AS NEEDED
Status: DISCONTINUED | OUTPATIENT
Start: 2025-03-04 | End: 2025-03-05 | Stop reason: HOSPADM

## 2025-03-04 RX ORDER — IOPAMIDOL 612 MG/ML
100 INJECTION, SOLUTION INTRAVASCULAR
Status: COMPLETED | OUTPATIENT
Start: 2025-03-04 | End: 2025-03-04

## 2025-03-04 RX ADMIN — CEPHALEXIN 500 MG: 500 CAPSULE ORAL at 22:34

## 2025-03-04 RX ADMIN — IOPAMIDOL 85 ML: 612 INJECTION, SOLUTION INTRAVENOUS at 19:44

## 2025-03-04 NOTE — ED TRIAGE NOTES
Patient to ED via EMS from [facility]. Patient c/o RLQ abdominal pain, right flank pain and constipation x 4 days.

## 2025-03-05 NOTE — DISCHARGE INSTRUCTIONS
Please follow with your primary care physician within 1 week for repeat evaluation    I would advise initiating ongoing course of MiraLAX to help prevent return of constipation.  I would recommend 1 capful once per day except in times of significant constipation when you can increase this to 1 capful twice a day for 3 to 5 days.  Please be sure to drink plenty of fluids to help with movements    Please return to the ER with new or worsening symptoms including but limited to uncontrolled pain, fevers, chills, changes in mental status

## 2025-03-05 NOTE — ED PROVIDER NOTES
EMERGENCY DEPARTMENT ENCOUNTER  Room Number:  28/28  PCP: Salomon Mcknight MD  Independent Historians: Patient and Family      HPI:  Chief Complaint: had concerns including Abdominal Pain and Constipation.       Context: Singh Hatch is a 74 y.o. male with a medical history of dementia, Parkinson's, diabetes, constipation who presents to the ED c/o acute abdominal pain and constipation.  Patient reportedly last had a bowel movement late last week after a suppository.  It is unclear if patient has had a bowel movement since that time.  Patient is complaining of pain throughout his lower abdomen.  He is not on any maintenance MiraLAX or fiber supplements.      Review of prior external notes (non-ED) -and- Review of prior external test results outside of this encounter: Discharge summary from 10/14/2024 reviewed and notable for admission secondary to altered mental state.  Patient returned to his baseline after Holland was changed.  Patient resumed on his home meds and eventually discharged.    Prescription drug monitoring program review:         PAST MEDICAL HISTORY  Active Ambulatory Problems     Diagnosis Date Noted    Chronic indwelling Holland catheter 10/12/2024    Dementia 10/12/2024    Parkinson disease 10/12/2024    Type 2 diabetes mellitus with hyperglycemia, with long-term current use of insulin 10/13/2024    Acute metabolic encephalopathy 10/13/2024     Resolved Ambulatory Problems     Diagnosis Date Noted    Urinary tract infection in male 10/12/2024     Past Medical History:   Diagnosis Date    Age-related osteoporosis without current pathological fracture     Atherosclerotic heart disease of native coronary artery without angina pectoris     Essential (primary) hypertension     Irritant contact dermatitis due to fecal, urinary or dual incontinence     Long term (current) use of insulin     Mixed hyperlipidemia     Other cervical disc degeneration, unspecified cervical region     Other intervertebral  disc degeneration, 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     Other obstructive and reflux uropathy     Other specified anxiety disorders     Parkinson's disease without dyskinesia, without mention of fluctuations     Personal history of malignant neoplasm of prostate     Type 2 diabetes mellitus with hyperglycemia     Unspecified dementia, unspecified severity, without behavioral disturbance, psychotic disturbance, mood disturbance, and anxiety     Vitamin D deficiency, unspecified          PAST SURGICAL HISTORY  No past surgical history on file.      FAMILY HISTORY  No family history on file.      SOCIAL HISTORY  Social History     Socioeconomic History    Marital status:    Tobacco Use    Smoking status: Never     Passive exposure: Never    Smokeless tobacco: Never   Vaping Use    Vaping status: Never Used   Substance and Sexual Activity    Alcohol use: Never    Drug use: Not Currently         ALLERGIES  Patient has no known allergies.      REVIEW OF SYSTEMS  Review of Systems  Included in HPI  All systems reviewed and negative except for those discussed in HPI.      PHYSICAL EXAM    I have reviewed the triage vital signs and nursing notes.    ED Triage Vitals   Temp Heart Rate Resp BP SpO2   03/04/25 1657 03/04/25 1656 03/04/25 1656 03/04/25 1656 03/04/25 1656   98.2 °F (36.8 °C) 70 18 115/72 95 %      Temp src Heart Rate Source Patient Position BP Location FiO2 (%)   03/04/25 1657 -- -- -- --   Oral           Physical Exam  GENERAL: alert, no acute distress  SKIN: Warm, dry  HENT: Normocephalic, atraumatic  EYES: no scleral icterus  CV: regular rhythm, regular rate  RESPIRATORY: normal effort, lungs clear  ABDOMEN: soft, mild abdominal tenderness to palpation  MUSCULOSKELETAL: no deformity  NEURO: alert, moves all extremities, follows commands            LAB RESULTS  Recent Results (from the past 24 hours)   Comprehensive Metabolic Panel    Collection Time: 03/04/25  5:44 PM    Specimen: Arm,  Right; Blood   Result Value Ref Range    Glucose 151 (H) 65 - 99 mg/dL    BUN 25 (H) 8 - 23 mg/dL    Creatinine 1.50 (H) 0.76 - 1.27 mg/dL    Sodium 145 136 - 145 mmol/L    Potassium 4.1 3.5 - 5.2 mmol/L    Chloride 110 (H) 98 - 107 mmol/L    CO2 25.3 22.0 - 29.0 mmol/L    Calcium 8.7 8.6 - 10.5 mg/dL    Total Protein 6.3 6.0 - 8.5 g/dL    Albumin 3.0 (L) 3.5 - 5.2 g/dL    ALT (SGPT) <5 1 - 41 U/L    AST (SGOT) 15 1 - 40 U/L    Alkaline Phosphatase 103 39 - 117 U/L    Total Bilirubin 0.2 0.0 - 1.2 mg/dL    Globulin 3.3 gm/dL    A/G Ratio 0.9 g/dL    BUN/Creatinine Ratio 16.7 7.0 - 25.0    Anion Gap 9.7 5.0 - 15.0 mmol/L    eGFR 49.2 (L) >60.0 mL/min/1.73   Lipase    Collection Time: 03/04/25  5:44 PM    Specimen: Arm, Right; Blood   Result Value Ref Range    Lipase 17 13 - 60 U/L   Lactic Acid, Plasma    Collection Time: 03/04/25  5:44 PM    Specimen: Arm, Right; Blood   Result Value Ref Range    Lactate 0.9 0.5 - 2.0 mmol/L   Green Top (Gel)    Collection Time: 03/04/25  5:44 PM   Result Value Ref Range    Extra Tube Hold for add-ons.    Lavender Top    Collection Time: 03/04/25  5:44 PM   Result Value Ref Range    Extra Tube hold for add-on    Gold Top - SST    Collection Time: 03/04/25  5:44 PM   Result Value Ref Range    Extra Tube Hold for add-ons.    Light Blue Top    Collection Time: 03/04/25  5:44 PM   Result Value Ref Range    Extra Tube Hold for add-ons.    CBC Auto Differential    Collection Time: 03/04/25  5:44 PM    Specimen: Arm, Right; Blood   Result Value Ref Range    WBC 7.09 3.40 - 10.80 10*3/mm3    RBC 4.16 4.14 - 5.80 10*6/mm3    Hemoglobin 12.6 (L) 13.0 - 17.7 g/dL    Hematocrit 37.1 (L) 37.5 - 51.0 %    MCV 89.2 79.0 - 97.0 fL    MCH 30.3 26.6 - 33.0 pg    MCHC 34.0 31.5 - 35.7 g/dL    RDW 13.4 12.3 - 15.4 %    RDW-SD 43.8 37.0 - 54.0 fl    MPV 9.2 6.0 - 12.0 fL    Platelets 198 140 - 450 10*3/mm3    Neutrophil % 62.4 42.7 - 76.0 %    Lymphocyte % 25.0 19.6 - 45.3 %    Monocyte % 11.3 5.0 -  12.0 %    Eosinophil % 0.8 0.3 - 6.2 %    Basophil % 0.1 0.0 - 1.5 %    Immature Grans % 0.4 0.0 - 0.5 %    Neutrophils, Absolute 4.42 1.70 - 7.00 10*3/mm3    Lymphocytes, Absolute 1.77 0.70 - 3.10 10*3/mm3    Monocytes, Absolute 0.80 0.10 - 0.90 10*3/mm3    Eosinophils, Absolute 0.06 0.00 - 0.40 10*3/mm3    Basophils, Absolute 0.01 0.00 - 0.20 10*3/mm3    Immature Grans, Absolute 0.03 0.00 - 0.05 10*3/mm3    nRBC 0.0 0.0 - 0.2 /100 WBC   Urinalysis With Microscopic If Indicated (No Culture) - Urine, Clean Catch    Collection Time: 03/04/25  8:44 PM    Specimen: Urine, Clean Catch   Result Value Ref Range    Color, UA Yellow Yellow, Straw    Appearance, UA Clear Clear    pH, UA 6.0 5.0 - 8.0    Specific Gravity, UA >1.030 (H) 1.005 - 1.030    Glucose, UA Negative Negative    Ketones, UA Trace (A) Negative    Bilirubin, UA Negative Negative    Blood, UA Negative Negative    Protein, UA Trace (A) Negative    Leuk Esterase, UA Negative Negative    Nitrite, UA Positive (A) Negative    Urobilinogen, UA 0.2 E.U./dL 0.2 - 1.0 E.U./dL   Urinalysis, Microscopic Only - Urine, Clean Catch    Collection Time: 03/04/25  8:44 PM    Specimen: Urine, Clean Catch   Result Value Ref Range    RBC, UA 0-2 None Seen, 0-2 /HPF    WBC, UA 6-10 (A) None Seen, 0-2 /HPF    Bacteria, UA 2+ (A) None Seen /HPF    Squamous Epithelial Cells, UA 0-2 None Seen, 0-2 /HPF    Hyaline Casts, UA 0-2 None Seen /LPF    Methodology Automated Microscopy          RADIOLOGY  CT Abdomen Pelvis With Contrast    Result Date: 3/4/2025  CT ABDOMEN PELVIS W CONTRAST-  Radiation dose reduction techniques were utilized, including automated exposure control and exposure modulation based on body size.  Clinical: Abdominal pain  COMPARISON 2/24/2025  FINDINGS: 1. There is considerable amount of fecal material demonstrated within the rectum, constipation versus impaction. Diverticulosis of the colon without diverticulitis. The appendix and small bowel have a satisfactory  appearance. Stomach and duodenum are unremarkable.  2. Similar to the previous examination multiloculated cystic area along the pancreatic tail is again seen. CT surveillance with 6-month follow-up. There is a tiny cystic area again noted within the pancreatic head with subtle pancreatic ductal dilatation distal to this location to the ampulla. No biliary duct dilatation, the gallbladder is normal.  3. There is a small hepatic cyst reidentified, the liver is otherwise unremarkable. The spleen and adrenal glands are satisfactory in appearance. The left kidney is normal, there is a small right renal cyst again seen. No calculus or obstructive uropathy. The ureters are normal in course and caliber to the urinary bladder which is satisfactory in appearance. Previous prostatectomy. No free intraperitoneal gas or fluid. The remainder is unremarkable.     This report was finalized on 3/4/2025 7:59 PM by Dr. John Paul Munoz M.D on Workstation: [id number]         MEDICATIONS GIVEN IN ER  Medications   sodium chloride 0.9 % flush 10 mL (has no administration in time range)   cephalexin (KEFLEX) capsule 500 mg (has no administration in time range)   iopamidol (ISOVUE-300) 61 % injection 100 mL (85 mL Intravenous Given by Other 3/4/25 1944)         ORDERS PLACED DURING THIS VISIT:  Orders Placed This Encounter   Procedures    CT Abdomen Pelvis With Contrast    [place] Draw    Comprehensive Metabolic Panel    Lipase    Urinalysis With Microscopic If Indicated (No Culture) - Urine, Clean Catch    Lactic Acid, Plasma    CBC Auto Differential    Urinalysis, Microscopic Only - Urine, Clean Catch    NPO Diet NPO Type: Strict NPO    Undress & Gown    Soap suds enema    Insert Peripheral IV    CBC & Differential    Green Top (Gel)    Lavender Top    Gold Top - SST    Light Blue Top         OUTPATIENT MEDICATION MANAGEMENT:  Current Facility-Administered Medications Ordered in Epic   Medication Dose Route Frequency Provider Last Rate  Last Admin    cephalexin (KEFLEX) capsule 500 mg  500 mg Oral Once Tawanda Whitlock MD        sodium chloride 0.9 % flush 10 mL  10 mL Intravenous PRN Tawanda Whitlock MD         Current Outpatient Medications Ordered in Epic   Medication Sig Dispense Refill    aspirin 81 MG EC tablet Take 1 tablet by mouth Daily.      bisacodyl (Dulcolax) 10 MG suppository Insert 1 suppository into the rectum Daily As Needed for Constipation. 28 suppository 0    carbidopa-levodopa (SINEMET)  MG per tablet Take 1.5 tablets by mouth 4 (Four) Times a Day.      carbidopa-levodopa CR (SINEMET CR)  MG per CR tablet Take 1 tablet by mouth Every Night. Give at 2300      cephalexin (KEFLEX) 500 MG capsule Take 1 capsule by mouth 2 (Two) Times a Day for 7 days. 14 capsule 0    dextrose (GLUTOSE) 40 % gel Take 15 g by mouth Every 1 (One) Hour As Needed for Low Blood Sugar.      dicyclomine (BENTYL) 20 MG tablet Take 1 tablet by mouth Every 6 (Six) Hours. 20 tablet 0    glucagon, human recombinant, (GLUCAGEN DIAGNOSTIC) 1 MG injection Infuse 1 mg into a venous catheter 1 (One) Time.      insulin glargine (LANTUS, SEMGLEE) 100 UNIT/ML injection Inject 40 Units under the skin into the appropriate area as directed Every Night.      Insulin Lispro (humaLOG) 100 UNIT/ML injection Inject 2-10 Units under the skin into the appropriate area as directed 3 (Three) Times a Day Before Meals. -199=2 unit,200-249=4 unit,250-299=6 unit,300-349=8 unit,350-400=10 unit,>400= call MD      miconazole (MICOTIN) 2 % cream Apply 1 Application topically to the appropriate area as directed 2 (Two) Times a Day. Clean scrotum with soap and water and apply miconazole to area r/t yeast      mirtazapine (REMERON SOL-TAB) 15 MG disintegrating tablet Place 1 tablet on the tongue Every Night.      polyethylene glycol (MIRALAX) 17 GM/SCOOP powder Mix one capful (17 g) in liquid and take by mouth Daily. 510 g 0    sennosides-docusate (senna-docusate  sodium) 8.6-50 MG per tablet Take 1 tablet by mouth Daily for 30 days. 30 tablet 0    vitamin B-12 (CYANOCOBALAMIN) 1000 MCG tablet Take 1 tablet by mouth Daily.      vitamin D (ERGOCALCIFEROL) 1.25 MG (19787 UT) capsule capsule Take 1 capsule by mouth 1 (One) Time Per Week.           PROCEDURES  Procedures            PROGRESS, DATA ANALYSIS, CONSULTS, AND MEDICAL DECISION MAKING  All labs have been independently interpreted by me.  All radiology studies have been reviewed by me. All EKG's have been independently viewed and interpreted by me.  Discussion below represents my analysis of pertinent findings related to patient's condition, differential diagnosis, treatment plan and final disposition.    Differential diagnosis includes but is not limited to intubation, impaction, bowel obstruction, appendicitis, UTI.    Clinical Scores:                                       ED Course as of 03/04/25 2222   Tue Mar 04, 2025   2018 CT abdomen and pelvis interpreted by me and demonstrates no evidence of free air [MW]   2027 Will proceed with straight cath for urine and enema for constipation relief. [MW]   2218 Patient with multiple bowel movements and significant release of abdominal pressure.  He is having some ongoing cramping.  Advised this is not uncommon after enemas.  Counseled him to follow-up closely with primary care.    I think urine is questionable for UTI at this time, will initiate short course of antibiotics. [MW]      ED Course User Index  [MW] Tawanda Whitlock MD             AS OF 22:22 EST VITALS:    BP - 135/87  HR - 79  TEMP - 98.2 °F (36.8 °C) (Oral)  O2 SATS - 93%    COMPLEXITY OF CARE  Admission was considered but after careful review of the patient's presentation, physical examination, diagnostic results, and response to treatment the patient may be safely discharged with outpatient follow-up.      DIAGNOSIS  Final diagnoses:   Constipation, unspecified constipation type   Acute cystitis without  hematuria         DISPOSITION  ED Disposition       ED Disposition   Discharge    Condition   Stable    Comment   --                Please note that portions of this document were completed with a voice recognition program.    Note Disclaimer: At [facility], we believe that sharing information builds trust and better relationships. You are receiving this note because you recently visited [facility]. It is possible you will see health information before a provider has talked with you about it. This kind of information can be easy to misunderstand. To help you fully understand what it means for your health, we urge you to discuss this note with your provider.         Tawanda Whitlock MD  03/04/25 9549

## 2025-03-12 ENCOUNTER — APPOINTMENT (OUTPATIENT)
Dept: CT IMAGING | Facility: HOSPITAL | Age: 75
DRG: 392 | End: 2025-03-12
Payer: MEDICARE

## 2025-03-12 ENCOUNTER — HOSPITAL ENCOUNTER (INPATIENT)
Facility: HOSPITAL | Age: 75
LOS: 1 days | Discharge: NURSING, REHAB OR LONG-TERM CARE | DRG: 392 | End: 2025-03-15
Attending: EMERGENCY MEDICINE | Admitting: HOSPITALIST
Payer: MEDICARE

## 2025-03-12 DIAGNOSIS — S22.089A CLOSED FRACTURE OF ELEVENTH THORACIC VERTEBRA, UNSPECIFIED FRACTURE MORPHOLOGY, INITIAL ENCOUNTER: ICD-10-CM

## 2025-03-12 DIAGNOSIS — K59.00 CONSTIPATION, UNSPECIFIED CONSTIPATION TYPE: ICD-10-CM

## 2025-03-12 DIAGNOSIS — R93.5 ABNORMAL CT OF THE ABDOMEN: ICD-10-CM

## 2025-03-12 DIAGNOSIS — R10.9 INTRACTABLE ABDOMINAL PAIN: Primary | ICD-10-CM

## 2025-03-12 LAB
ALBUMIN SERPL-MCNC: 3.8 G/DL (ref 3.5–5.2)
ALBUMIN/GLOB SERPL: 1.4 G/DL
ALP SERPL-CCNC: 123 U/L (ref 39–117)
ALT SERPL W P-5'-P-CCNC: 7 U/L (ref 1–41)
ANION GAP SERPL CALCULATED.3IONS-SCNC: 8.3 MMOL/L (ref 5–15)
AST SERPL-CCNC: 11 U/L (ref 1–40)
BASOPHILS # BLD AUTO: 0.03 10*3/MM3 (ref 0–0.2)
BASOPHILS NFR BLD AUTO: 0.3 % (ref 0–1.5)
BILIRUB SERPL-MCNC: 0.2 MG/DL (ref 0–1.2)
BUN SERPL-MCNC: 21 MG/DL (ref 8–23)
BUN/CREAT SERPL: 18.3 (ref 7–25)
CALCIUM SPEC-SCNC: 9 MG/DL (ref 8.6–10.5)
CHLORIDE SERPL-SCNC: 107 MMOL/L (ref 98–107)
CO2 SERPL-SCNC: 26.7 MMOL/L (ref 22–29)
CREAT SERPL-MCNC: 1.15 MG/DL (ref 0.76–1.27)
DEPRECATED RDW RBC AUTO: 44.3 FL (ref 37–54)
EGFRCR SERPLBLD CKD-EPI 2021: 67.6 ML/MIN/1.73
EOSINOPHIL # BLD AUTO: 0.06 10*3/MM3 (ref 0–0.4)
EOSINOPHIL NFR BLD AUTO: 0.6 % (ref 0.3–6.2)
ERYTHROCYTE [DISTWIDTH] IN BLOOD BY AUTOMATED COUNT: 13.5 % (ref 12.3–15.4)
GLOBULIN UR ELPH-MCNC: 2.8 GM/DL
GLUCOSE BLDC GLUCOMTR-MCNC: 78 MG/DL (ref 70–130)
GLUCOSE BLDC GLUCOMTR-MCNC: 82 MG/DL (ref 70–130)
GLUCOSE SERPL-MCNC: 70 MG/DL (ref 65–99)
HBA1C MFR BLD: 8.3 % (ref 4.8–5.6)
HCT VFR BLD AUTO: 37.7 % (ref 37.5–51)
HGB BLD-MCNC: 12.8 G/DL (ref 13–17.7)
HOLD SPECIMEN: NORMAL
HOLD SPECIMEN: NORMAL
IMM GRANULOCYTES # BLD AUTO: 0.03 10*3/MM3 (ref 0–0.05)
IMM GRANULOCYTES NFR BLD AUTO: 0.3 % (ref 0–0.5)
LIPASE SERPL-CCNC: 17 U/L (ref 13–60)
LYMPHOCYTES # BLD AUTO: 1.52 10*3/MM3 (ref 0.7–3.1)
LYMPHOCYTES NFR BLD AUTO: 16.3 % (ref 19.6–45.3)
MCH RBC QN AUTO: 30.4 PG (ref 26.6–33)
MCHC RBC AUTO-ENTMCNC: 34 G/DL (ref 31.5–35.7)
MCV RBC AUTO: 89.5 FL (ref 79–97)
MONOCYTES # BLD AUTO: 1.2 10*3/MM3 (ref 0.1–0.9)
MONOCYTES NFR BLD AUTO: 12.9 % (ref 5–12)
NEUTROPHILS NFR BLD AUTO: 6.46 10*3/MM3 (ref 1.7–7)
NEUTROPHILS NFR BLD AUTO: 69.6 % (ref 42.7–76)
NRBC BLD AUTO-RTO: 0 /100 WBC (ref 0–0.2)
PLATELET # BLD AUTO: 235 10*3/MM3 (ref 140–450)
PMV BLD AUTO: 9.4 FL (ref 6–12)
POTASSIUM SERPL-SCNC: 3.7 MMOL/L (ref 3.5–5.2)
PROT SERPL-MCNC: 6.6 G/DL (ref 6–8.5)
RBC # BLD AUTO: 4.21 10*6/MM3 (ref 4.14–5.8)
SODIUM SERPL-SCNC: 142 MMOL/L (ref 136–145)
T4 FREE SERPL-MCNC: 1.18 NG/DL (ref 0.92–1.68)
TSH SERPL DL<=0.05 MIU/L-ACNC: 1.1 UIU/ML (ref 0.27–4.2)
WBC NRBC COR # BLD AUTO: 9.3 10*3/MM3 (ref 3.4–10.8)
WHOLE BLOOD HOLD COAG: NORMAL
WHOLE BLOOD HOLD SPECIMEN: NORMAL

## 2025-03-12 PROCEDURE — 84439 ASSAY OF FREE THYROXINE: CPT | Performed by: HOSPITALIST

## 2025-03-12 PROCEDURE — G0378 HOSPITAL OBSERVATION PER HR: HCPCS

## 2025-03-12 PROCEDURE — 25010000002 KETOROLAC TROMETHAMINE PER 15 MG: Performed by: NURSE PRACTITIONER

## 2025-03-12 PROCEDURE — 80053 COMPREHEN METABOLIC PANEL: CPT | Performed by: NURSE PRACTITIONER

## 2025-03-12 PROCEDURE — 84443 ASSAY THYROID STIM HORMONE: CPT | Performed by: HOSPITALIST

## 2025-03-12 PROCEDURE — 83690 ASSAY OF LIPASE: CPT | Performed by: NURSE PRACTITIONER

## 2025-03-12 PROCEDURE — 86301 IMMUNOASSAY TUMOR CA 19-9: CPT | Performed by: HOSPITALIST

## 2025-03-12 PROCEDURE — 83036 HEMOGLOBIN GLYCOSYLATED A1C: CPT | Performed by: HOSPITALIST

## 2025-03-12 PROCEDURE — 82948 REAGENT STRIP/BLOOD GLUCOSE: CPT

## 2025-03-12 PROCEDURE — 85025 COMPLETE CBC W/AUTO DIFF WBC: CPT | Performed by: NURSE PRACTITIONER

## 2025-03-12 PROCEDURE — 74176 CT ABD & PELVIS W/O CONTRAST: CPT

## 2025-03-12 PROCEDURE — 99285 EMERGENCY DEPT VISIT HI MDM: CPT

## 2025-03-12 RX ORDER — AMOXICILLIN 250 MG
2 CAPSULE ORAL 2 TIMES DAILY PRN
Status: DISCONTINUED | OUTPATIENT
Start: 2025-03-12 | End: 2025-03-15 | Stop reason: HOSPADM

## 2025-03-12 RX ORDER — SERTRALINE HYDROCHLORIDE 25 MG/1
75 TABLET, FILM COATED ORAL DAILY
COMMUNITY

## 2025-03-12 RX ORDER — ONDANSETRON 4 MG/1
4 TABLET, ORALLY DISINTEGRATING ORAL EVERY 6 HOURS PRN
Status: DISCONTINUED | OUTPATIENT
Start: 2025-03-12 | End: 2025-03-15 | Stop reason: HOSPADM

## 2025-03-12 RX ORDER — POLYETHYLENE GLYCOL 3350 17 G/17G
17 POWDER, FOR SOLUTION ORAL DAILY PRN
Status: DISCONTINUED | OUTPATIENT
Start: 2025-03-12 | End: 2025-03-15 | Stop reason: HOSPADM

## 2025-03-12 RX ORDER — SODIUM CHLORIDE 9 MG/ML
40 INJECTION, SOLUTION INTRAVENOUS AS NEEDED
Status: DISCONTINUED | OUTPATIENT
Start: 2025-03-12 | End: 2025-03-15 | Stop reason: HOSPADM

## 2025-03-12 RX ORDER — IBUPROFEN 600 MG/1
1 TABLET ORAL
Status: DISCONTINUED | OUTPATIENT
Start: 2025-03-12 | End: 2025-03-15 | Stop reason: HOSPADM

## 2025-03-12 RX ORDER — CARBIDOPA/LEVODOPA 25MG-250MG
1.5 TABLET ORAL 4 TIMES DAILY
Status: DISCONTINUED | OUTPATIENT
Start: 2025-03-12 | End: 2025-03-15 | Stop reason: HOSPADM

## 2025-03-12 RX ORDER — GABAPENTIN 300 MG/1
300 CAPSULE ORAL 2 TIMES DAILY
COMMUNITY

## 2025-03-12 RX ORDER — KETOROLAC TROMETHAMINE 15 MG/ML
15 INJECTION, SOLUTION INTRAMUSCULAR; INTRAVENOUS ONCE
Status: COMPLETED | OUTPATIENT
Start: 2025-03-12 | End: 2025-03-12

## 2025-03-12 RX ORDER — SODIUM CHLORIDE 0.9 % (FLUSH) 0.9 %
10 SYRINGE (ML) INJECTION AS NEEDED
Status: DISCONTINUED | OUTPATIENT
Start: 2025-03-12 | End: 2025-03-15 | Stop reason: HOSPADM

## 2025-03-12 RX ORDER — INSULIN LISPRO 100 [IU]/ML
2-7 INJECTION, SOLUTION INTRAVENOUS; SUBCUTANEOUS
Status: DISCONTINUED | OUTPATIENT
Start: 2025-03-12 | End: 2025-03-15 | Stop reason: HOSPADM

## 2025-03-12 RX ORDER — ACETAMINOPHEN 160 MG/5ML
650 SOLUTION ORAL EVERY 4 HOURS PRN
Status: DISCONTINUED | OUTPATIENT
Start: 2025-03-12 | End: 2025-03-15 | Stop reason: HOSPADM

## 2025-03-12 RX ORDER — NICOTINE POLACRILEX 4 MG
15 LOZENGE BUCCAL
Status: DISCONTINUED | OUTPATIENT
Start: 2025-03-12 | End: 2025-03-15 | Stop reason: HOSPADM

## 2025-03-12 RX ORDER — MIRTAZAPINE 30 MG/1
45 TABLET, FILM COATED ORAL NIGHTLY
COMMUNITY

## 2025-03-12 RX ORDER — MIRTAZAPINE 15 MG/1
45 TABLET, FILM COATED ORAL NIGHTLY
Status: DISCONTINUED | OUTPATIENT
Start: 2025-03-12 | End: 2025-03-15 | Stop reason: HOSPADM

## 2025-03-12 RX ORDER — ONDANSETRON 2 MG/ML
4 INJECTION INTRAMUSCULAR; INTRAVENOUS EVERY 6 HOURS PRN
Status: DISCONTINUED | OUTPATIENT
Start: 2025-03-12 | End: 2025-03-15 | Stop reason: HOSPADM

## 2025-03-12 RX ORDER — BISACODYL 5 MG/1
5 TABLET, DELAYED RELEASE ORAL DAILY PRN
Status: DISCONTINUED | OUTPATIENT
Start: 2025-03-12 | End: 2025-03-15 | Stop reason: HOSPADM

## 2025-03-12 RX ORDER — ACETAMINOPHEN 650 MG/1
650 SUPPOSITORY RECTAL EVERY 4 HOURS PRN
Status: DISCONTINUED | OUTPATIENT
Start: 2025-03-12 | End: 2025-03-15 | Stop reason: HOSPADM

## 2025-03-12 RX ORDER — DICYCLOMINE HYDROCHLORIDE 10 MG/1
10 CAPSULE ORAL ONCE
Status: COMPLETED | OUTPATIENT
Start: 2025-03-12 | End: 2025-03-12

## 2025-03-12 RX ORDER — ACETAMINOPHEN 325 MG/1
650 TABLET ORAL EVERY 4 HOURS PRN
Status: DISCONTINUED | OUTPATIENT
Start: 2025-03-12 | End: 2025-03-15 | Stop reason: HOSPADM

## 2025-03-12 RX ORDER — BISACODYL 10 MG
10 SUPPOSITORY, RECTAL RECTAL DAILY PRN
Status: DISCONTINUED | OUTPATIENT
Start: 2025-03-12 | End: 2025-03-15 | Stop reason: HOSPADM

## 2025-03-12 RX ORDER — CARBIDOPA AND LEVODOPA 50; 200 MG/1; MG/1
1 TABLET, EXTENDED RELEASE ORAL NIGHTLY
Status: DISCONTINUED | OUTPATIENT
Start: 2025-03-12 | End: 2025-03-15 | Stop reason: HOSPADM

## 2025-03-12 RX ORDER — SODIUM CHLORIDE 0.9 % (FLUSH) 0.9 %
10 SYRINGE (ML) INJECTION EVERY 12 HOURS SCHEDULED
Status: DISCONTINUED | OUTPATIENT
Start: 2025-03-12 | End: 2025-03-15 | Stop reason: HOSPADM

## 2025-03-12 RX ORDER — DEXTROSE MONOHYDRATE 25 G/50ML
25 INJECTION, SOLUTION INTRAVENOUS
Status: DISCONTINUED | OUTPATIENT
Start: 2025-03-12 | End: 2025-03-15 | Stop reason: HOSPADM

## 2025-03-12 RX ORDER — HYDROCODONE BITARTRATE AND ACETAMINOPHEN 5; 325 MG/1; MG/1
1 TABLET ORAL EVERY 6 HOURS PRN
Refills: 0 | Status: DISCONTINUED | OUTPATIENT
Start: 2025-03-12 | End: 2025-03-15 | Stop reason: HOSPADM

## 2025-03-12 RX ADMIN — CARBIDOPA AND LEVODOPA 1 TABLET: 50; 200 TABLET, EXTENDED RELEASE ORAL at 21:16

## 2025-03-12 RX ADMIN — SERTRALINE HYDROCHLORIDE 75 MG: 50 TABLET, FILM COATED ORAL at 21:16

## 2025-03-12 RX ADMIN — CARBIDOPA AND LEVODOPA 1.5 TABLET: 25; 250 TABLET ORAL at 21:16

## 2025-03-12 RX ADMIN — KETOROLAC TROMETHAMINE 15 MG: 15 INJECTION, SOLUTION INTRAMUSCULAR; INTRAVENOUS at 09:14

## 2025-03-12 RX ADMIN — MIRTAZAPINE 45 MG: 15 TABLET, FILM COATED ORAL at 21:17

## 2025-03-12 RX ADMIN — HYDROCODONE BITARTRATE AND ACETAMINOPHEN 1 TABLET: 5; 325 TABLET ORAL at 22:31

## 2025-03-12 RX ADMIN — DICYCLOMINE HYDROCHLORIDE 10 MG: 10 CAPSULE ORAL at 09:17

## 2025-03-12 RX ADMIN — Medication 10 ML: at 21:30

## 2025-03-12 NOTE — ED NOTES
Nursing report ED to floor  Singh Hatch  74 y.o.  male    HPI :  HPI  Stated Reason for Visit: patient to ED via EMS from nursing home for generalized abdominal pain x several months- worsening today. denies N/V/D  History Obtained From: patient, EMS    Chief Complaint  Chief Complaint   Patient presents with    Abdominal Pain       Admitting doctor:   Gary Bear MD    Admitting diagnosis:   The primary encounter diagnosis was Intractable abdominal pain. Diagnoses of Constipation, unspecified constipation type, Abnormal CT of the abdomen, and Closed fracture of eleventh thoracic vertebra, unspecified fracture morphology, initial encounter were also pertinent to this visit.    Code status:   Current Code Status       Date Active Code Status Order ID Comments User Context       Prior            Allergies:   Patient has no known allergies.    Isolation:   Contact    Intake and Output  No intake or output data in the 24 hours ending 03/12/25 1352    Weight:       03/12/25  0654   Weight: 65.8 kg (145 lb)       Most recent vitals:   Vitals:    03/12/25 0918 03/12/25 0931 03/12/25 0958 03/12/25 1001   BP: 161/72 150/75  156/90   Pulse: 71 75 79 73   Resp:       Temp:       TempSrc:       SpO2: 97% 95% 97% 96%   Weight:       Height:           Active LDAs/IV Access:   Lines, Drains & Airways       Active LDAs       Name Placement date Placement time Site Days    Peripheral IV 03/12/25 0712 Right Antecubital 03/12/25  0712  Antecubital  less than 1                    Labs (abnormal labs have a star):   Labs Reviewed   COMPREHENSIVE METABOLIC PANEL - Abnormal; Notable for the following components:       Result Value    Alkaline Phosphatase 123 (*)     All other components within normal limits    Narrative:     GFR Categories in Chronic Kidney Disease (CKD)      GFR Category          GFR (mL/min/1.73)    Interpretation  G1                     90 or greater         Normal or high (1)  G2                      60-89                 Mild decrease (1)  G3a                   45-59                Mild to moderate decrease  G3b                   30-44                Moderate to severe decrease  G4                    15-29                Severe decrease  G5                    14 or less           Kidney failure          (1)In the absence of evidence of kidney disease, neither GFR category G1 or G2 fulfill the criteria for CKD.    eGFR calculation 2021 CKD-EPI creatinine equation, which does not include race as a factor   CBC WITH AUTO DIFFERENTIAL - Abnormal; Notable for the following components:    Hemoglobin 12.8 (*)     Lymphocyte % 16.3 (*)     Monocyte % 12.9 (*)     Monocytes, Absolute 1.20 (*)     All other components within normal limits   LIPASE - Normal   RAINBOW DRAW    Narrative:     The following orders were created for panel order [place] Draw.  Procedure                               Abnormality         Status                     ---------                               -----------         ------                     Green Top (Gel)[[id number]]                                  Final result               Lavender Top[[id number]]                                     Final result               Gold Top - SST[[id number]]                                   Final result               Light Blue Top[[id number]]                                   Final result                 Please view results for these tests on the individual orders.   URINALYSIS W/ MICROSCOPIC IF INDICATED (NO CULTURE)   CBC AND DIFFERENTIAL    Narrative:     The following orders were created for panel order CBC & Differential.  Procedure                               Abnormality         Status                     ---------                               -----------         ------                     CBC Auto Differential[[id number]]        Abnormal            Final result                 Please view results for these tests on the individual orders.   GREEN TOP   LAVENDER  TOP   GOLD TOP - SST   LIGHT BLUE TOP       EKG:   No orders to display       Meds given in ED:   Medications   sodium chloride 0.9 % flush 10 mL (has no administration in time range)   ketorolac (TORADOL) injection 15 mg (15 mg Intravenous Given 3/12/25 0914)   dicyclomine (BENTYL) capsule 10 mg (10 mg Oral Given 3/12/25 0917)       Imaging results:  CT Abdomen Pelvis Without Contrast  Result Date: 3/12/2025   1. Colonic diverticulosis. Minimal stranding seen adjacent to a diverticulum in the descending colon questionable for a very early diverticulitis. 2. Large amount of gas and stool seen in the rectum, with distention, unchanged. 3. 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 Nonemergent pancreas MRI could better characterize. 4. Stable inferior endplate fracture along the right margin of the T11 vertebral body that appears new from CT performed February 24, 2025  This report was finalized on 3/12/2025 12:24 PM by Dr. Heri Stern M.D on Workstation: [id number]        Ambulatory status:       Social issues:   Social History     Socioeconomic History    Marital status:    Tobacco Use    Smoking status: Never     Passive exposure: Never    Smokeless tobacco: Never   Vaping Use    Vaping status: Never Used   Substance and Sexual Activity    Alcohol use: Never    Drug use: Not Currently       Peripheral Neurovascular  Peripheral Neurovascular (Adult)  Peripheral Neurovascular WDL: WDL, pulse assessment  Pulse Assessment: posterior tibial, radial    Neuro Cognitive  Neuro Cognitive (Adult)  Cognitive/Neuro/Behavioral WDL: WDL, orientation, level of consciousness  Level of Consciousness: Alert  Orientation: oriented x 4    Learning  Learning Assessment  Learning Readiness and Ability: no barriers identified    Respiratory  Respiratory WDL  Respiratory WDL: WDL    Abdominal Pain       Pain Assessments  Pain (Adult)  (0-10) Pain Rating: Rest: 8  Pain Location: abdomen    NIH Stroke Scale        Diane Anand RN  03/12/25 13:52 EDT

## 2025-03-12 NOTE — PROGRESS NOTES
Clinical Pharmacy Services: Medication History    Singh Hatch is a 74 y.o. male presenting to [facility] for   Chief Complaint   Patient presents with    Abdominal Pain       He  has a past medical history of Age-related osteoporosis without current pathological fracture, Atherosclerotic heart disease of native coronary artery without angina pectoris, Essential (primary) hypertension, Irritant contact dermatitis due to fecal, urinary or dual incontinence, Long term (current) use of insulin, Mixed hyperlipidemia, Other cervical disc degeneration, unspecified cervical region, Other intervertebral disc degeneration, 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, Other obstructive and reflux uropathy, Other specified anxiety disorders, Parkinson's disease without dyskinesia, without mention of fluctuations, Personal history of malignant neoplasm of prostate, Type 2 diabetes mellitus with hyperglycemia, Unspecified dementia, unspecified severity, without behavioral disturbance, psychotic disturbance, mood disturbance, and anxiety, and Vitamin D deficiency, unspecified.    Allergies as of 03/12/2025    (No Known Allergies)       Medication information was obtained from: Nursing Home   Pharmacy and Phone Number:       Prior to Admission Medications       Prescriptions Last Dose Informant Patient Reported? Taking?    aspirin 81 MG EC tablet 3/11/2025 shelter Yes Yes    Take 1 tablet by mouth Daily.    bisacodyl (Dulcolax) 10 MG suppository  Nursing Home No Yes    Insert 1 suppository into the rectum Daily As Needed for Constipation.    carbidopa-levodopa (SINEMET)  MG per tablet 3/11/2025 shelter Yes Yes    Take 1.5 tablets by mouth 4 (Four) Times a Day.    carbidopa-levodopa CR (SINEMET CR)  MG per CR tablet 3/11/2025  Yes Yes    Take 1 tablet by mouth Every Night. Give at 2300    cephalexin (KEFLEX) 500 MG capsule 3/11/2025 Nursing Home No Yes    Take 1 capsule by mouth 2 (Two)  Times a Day for 7 days.    Patient taking differently:  Take 1 capsule by mouth 2 (Two) Times a Day. Ends 3/13/25    dicyclomine (BENTYL) 20 MG tablet 3/12/2025 Nursing Home No Yes    Take 1 tablet by mouth Every 6 (Six) Hours.    gabapentin (NEURONTIN) 300 MG capsule 3/11/2025 [facility] [place] Yes Yes    Take 1 capsule by mouth 2 (Two) Times a Day.    insulin glargine (LANTUS, SEMGLEE) 100 UNIT/ML injection 3/11/2025  Yes Yes    Inject 40 Units under the skin into the appropriate area as directed Every Night.    Insulin Lispro (humaLOG) 100 UNIT/ML injection 3/11/2025 [facility] [place] Yes Yes    Inject 2-10 Units under the skin into the appropriate area as directed 3 (Three) Times a Day Before Meals. -199=2 unit,200-249=4 unit,250-299=6 unit,300-349=8 unit,350-400=10 unit,>400= call MD    miconazole (MICOTIN) 2 % cream 3/11/2025  Yes Yes    Apply 1 Application topically to the appropriate area as directed 2 (Two) Times a Day. Clean scrotum with soap and water and apply miconazole to area r/t yeast    mirtazapine (REMERON) 30 MG tablet 3/11/2025 [facility] Yes Yes    Take 1.5 tablets by mouth Every Night.    polyethylene glycol (MIRALAX) 17 GM/SCOOP powder 3/11/2025 [facility] No Yes    Mix one capful (17 g) in liquid and take by mouth Daily.    sennosides-docusate (senna-docusate sodium) 8.6-50 MG per tablet 3/11/2025 [facility] No Yes    Take 1 tablet by mouth Daily for 30 days.    sertraline (ZOLOFT) 25 MG tablet 3/11/2025 [facility] Yes Yes    Take 3 tablets by mouth Daily.    vitamin B-12 (CYANOCOBALAMIN) 1000 MCG tablet 3/11/2025 [facility] Yes Yes    Take 1 tablet by mouth Daily.    vitamin D (ERGOCALCIFEROL) 1.25 MG (97093 UT) capsule capsule Past Week Nursing Home Yes Yes    Take 1 capsule by mouth 1 (One) Time Per Week. Wednesdays    dextrose (GLUTOSE) 40 % gel   Yes No    Take 15 g by mouth Every 1 (One) Hour As Needed for Low Blood Sugar.    glucagon, human recombinant, (GLUCAGEN DIAGNOSTIC) 1 MG  injection  Nursing Home Yes No    Infuse 1 mg into a venous catheter 1 (One) Time.              Medication notes:     This medication list is complete to the best of my knowledge as of 3/12/2025    Please call if questions.    Cruz Malagon  Medication History Technician  [phone]    3/12/2025 11:09 EDT

## 2025-03-12 NOTE — ED PROVIDER NOTES
EMERGENCY DEPARTMENT ENCOUNTER    Room Number:  P384/1  Date seen:  3/12/2025  PCP: Salomon Mcknight MD  Historian/Independent historian: EMS  Chronic or social conditions impacting care: age      HPI:  Chief Complaint: constipation  A complete HPI/ROS/PMH/PSH/SH/FH are unobtainable due to: n/a  Context: Singh Hatch is a 74 y.o. male who presents to the ED c/o constipation. Patient sent from nursing facility ***    External Medical record review:         PAST MEDICAL HISTORY  Active Ambulatory Problems     Diagnosis Date Noted    Chronic indwelling Holland catheter 10/12/2024    Dementia 10/12/2024    Parkinson disease 10/12/2024    Type 2 diabetes mellitus with hyperglycemia, with long-term current use of insulin 10/13/2024    Acute metabolic encephalopathy 10/13/2024     Resolved Ambulatory Problems     Diagnosis Date Noted    Urinary tract infection in male 10/12/2024     Past Medical History:   Diagnosis Date    Age-related osteoporosis without current pathological fracture     Atherosclerotic heart disease of native coronary artery without angina pectoris     Essential (primary) hypertension     Irritant contact dermatitis due to fecal, urinary or dual incontinence     Long term (current) use of insulin     Mixed hyperlipidemia     Other cervical disc degeneration, unspecified cervical region     Other intervertebral disc degeneration, 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 No pertinent surgical history.      FAMILY  HISTORY  History reviewed. No pertinent family history.      SOCIAL HISTORY  Social History     Socioeconomic History    Marital status:    Tobacco Use    Smoking status: Never     Passive exposure: Never    Smokeless tobacco: Never   Vaping Use    Vaping status: Never Used   Substance and Sexual Activity    Alcohol use: Never    Drug use: Not Currently    Sexual activity: Never     Birth control/protection: Abstinence         ALLERGIES  Patient has no known allergies.        REVIEW OF SYSTEMS  Per HPI, otherwise negative.       PHYSICAL EXAM  ED Triage Vitals   Temp Heart Rate Resp BP SpO2   03/12/25 0654 03/12/25 0654 03/12/25 0656 03/12/25 0654 03/12/25 0654   98.1 °F (36.7 °C) 81 18 137/77 95 %      Temp src Heart Rate Source Patient Position BP Location FiO2 (%)   03/12/25 0654 -- -- -- --   Oral           Physical Exam  Vitals and nursing note reviewed.   Constitutional:       Appearance: Normal appearance. He is well-developed.   HENT:      Head: Normocephalic and atraumatic.      Mouth/Throat:      Mouth: Mucous membranes are moist.   Eyes:      Conjunctiva/sclera: Conjunctivae normal.   Cardiovascular:      Rate and Rhythm: Normal rate and regular rhythm.      Pulses: Normal pulses.      Heart sounds: Normal heart sounds.   Pulmonary:      Effort: Pulmonary effort is normal.      Breath sounds: Normal breath sounds. No wheezing or rhonchi.   Abdominal:      General: Bowel sounds are normal. There is distension (mild, soft distention).      Palpations: Abdomen is soft.      Tenderness: There is no abdominal tenderness. There is no guarding.   Skin:     General: Skin is warm.      Capillary Refill: Capillary refill takes less than 2 seconds.   Neurological:      Mental Status: He is alert and oriented to person, place, and time. Mental status is at baseline.   Psychiatric:         Mood and Affect: Mood normal.               LAB RESULTS  Recent Results (from the past 24 hours)   Comprehensive Metabolic  Panel    Collection Time: 03/12/25  7:10 AM    Specimen: Blood   Result Value Ref Range    Glucose 70 65 - 99 mg/dL    BUN 21 8 - 23 mg/dL    Creatinine 1.15 0.76 - 1.27 mg/dL    Sodium 142 136 - 145 mmol/L    Potassium 3.7 3.5 - 5.2 mmol/L    Chloride 107 98 - 107 mmol/L    CO2 26.7 22.0 - 29.0 mmol/L    Calcium 9.0 8.6 - 10.5 mg/dL    Total Protein 6.6 6.0 - 8.5 g/dL    Albumin 3.8 3.5 - 5.2 g/dL    ALT (SGPT) 7 1 - 41 U/L    AST (SGOT) 11 1 - 40 U/L    Alkaline Phosphatase 123 (H) 39 - 117 U/L    Total Bilirubin 0.2 0.0 - 1.2 mg/dL    Globulin 2.8 gm/dL    A/G Ratio 1.4 g/dL    BUN/Creatinine Ratio 18.3 7.0 - 25.0    Anion Gap 8.3 5.0 - 15.0 mmol/L    eGFR 67.6 >60.0 mL/min/1.73   Lipase    Collection Time: 03/12/25  7:10 AM    Specimen: Blood   Result Value Ref Range    Lipase 17 13 - 60 U/L   Green Top (Gel)    Collection Time: 03/12/25  7:10 AM   Result Value Ref Range    Extra Tube Hold for add-ons.    Lavender Top    Collection Time: 03/12/25  7:10 AM   Result Value Ref Range    Extra Tube hold for add-on    Gold Top - SST    Collection Time: 03/12/25  7:10 AM   Result Value Ref Range    Extra Tube Hold for add-ons.    Light Blue Top    Collection Time: 03/12/25  7:10 AM   Result Value Ref Range    Extra Tube Hold for add-ons.    CBC Auto Differential    Collection Time: 03/12/25  7:10 AM    Specimen: Blood   Result Value Ref Range    WBC 9.30 3.40 - 10.80 10*3/mm3    RBC 4.21 4.14 - 5.80 10*6/mm3    Hemoglobin 12.8 (L) 13.0 - 17.7 g/dL    Hematocrit 37.7 37.5 - 51.0 %    MCV 89.5 79.0 - 97.0 fL    MCH 30.4 26.6 - 33.0 pg    MCHC 34.0 31.5 - 35.7 g/dL    RDW 13.5 12.3 - 15.4 %    RDW-SD 44.3 37.0 - 54.0 fl    MPV 9.4 6.0 - 12.0 fL    Platelets 235 140 - 450 10*3/mm3    Neutrophil % 69.6 42.7 - 76.0 %    Lymphocyte % 16.3 (L) 19.6 - 45.3 %    Monocyte % 12.9 (H) 5.0 - 12.0 %    Eosinophil % 0.6 0.3 - 6.2 %    Basophil % 0.3 0.0 - 1.5 %    Immature Grans % 0.3 0.0 - 0.5 %    Neutrophils, Absolute 6.46 1.70 -  7.00 10*3/mm3    Lymphocytes, Absolute 1.52 0.70 - 3.10 10*3/mm3    Monocytes, Absolute 1.20 (H) 0.10 - 0.90 10*3/mm3    Eosinophils, Absolute 0.06 0.00 - 0.40 10*3/mm3    Basophils, Absolute 0.03 0.00 - 0.20 10*3/mm3    Immature Grans, Absolute 0.03 0.00 - 0.05 10*3/mm3    nRBC 0.0 0.0 - 0.2 /100 WBC       Ordered the above labs and reviewed the results.        RADIOLOGY  CT Abdomen Pelvis Without Contrast  Result Date: 3/12/2025  CT ABDOMEN PELVIS WO CONTRAST-  INDICATION: Abdominal pain  COMPARISON: CT abdomen pelvis March 4, 2025  TECHNIQUE: Routine CT abdomen/pelvis without IV contrast. Coronal and sagittal reformats. Radiation dose reduction techniques were utilized, including automated exposure control and exposure modulation based on body size.  FINDINGS:  Lung bases: Bibasilar lung opacities, suspect atelectasis. Coronary artery atherosclerotic calcifications. Borderline cardiomegaly. Gynecomastia.  ABDOMEN: Small cyst seen in segment 4A of the left hepatic lobe. Normal gallbladder. No biliary ductal dilatation. Spleen is normal in size. Multicystic lesion seen in the pancreatic tail, series 3, axial mage 45, measures approximately 4.7 x 2.2 cm. Mild pancreatic atrophy. No pancreatic ductal dilatation seen. No adrenal nodules. Symmetric perinephric edema. Small fluid attenuating right renal cysts. No urolithiasis. No hydronephrosis.  Pelvis: Prostatectomy. Unremarkable bladder. No bladder calculus.  Bowel: No small bowel obstruction. Colonic diverticulosis. Minimal stranding seen adjacent to a diverticulum in the left mid descending colon, series 3, axial mage 73, without extraluminal gas or pericolonic fluid collection. Large amount of gas and stool seen in the rectum, with distention. Normal appendix.  Abdominal wall: Pelvic wall scarring.  Retroperitoneum: No lymphadenopathy.  Vasculature: Mild aortoiliac atherosclerotic calcification. No abdominal aortic aneurysm.  Osseous structures: Old right-sided  rib fractures. Posterior lumbosacral fusion extends from L3 to through the sacroiliac joints. Anterior lumbar interbody fusion at L5/S1. Stable fracture seen along the right lateral margin of the T11 inferior endplate on series 3, axial mage 43, with involvement of the anterior column and without extension in the middle or posterior column, appears new from CT performed February 24, 2025.       1. Colonic diverticulosis. Minimal stranding seen adjacent to a diverticulum in the descending colon questionable for a very early diverticulitis. 2. Large amount of gas and stool seen in the rectum, with distention, unchanged. 3. 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 Nonemergent pancreas MRI could better characterize. 4. Stable inferior endplate fracture along the right margin of the T11 vertebral body that appears new from CT performed February 24, 2025  This report was finalized on 3/12/2025 12:24 PM by Dr. Heri Stern M.D on Workstation: [id number]        Ordered the above noted radiological studies. Reviewed by me in PACS.        MEDICATIONS GIVEN IN ER  Medications   sodium chloride 0.9 % flush 10 mL (has no administration in time range)   ketorolac (TORADOL) injection 15 mg (15 mg Intravenous Given 3/12/25 0914)   dicyclomine (BENTYL) capsule 10 mg (10 mg Oral Given 3/12/25 0917)           MEDICAL DECISION MAKING, PROGRESS, and CONSULTS    All labs have been independently reviewed by me.  All radiology studies have been reviewed by me and I have also reviewed the radiology report.   EKG's independently viewed and interpreted by me.  Discussion below represents my analysis of pertinent findings related to patient's condition, differential diagnosis, treatment plan and final disposition.    ***          Shared decision making/consideration for admission:  ***      Orders placed during this visit:  Orders Placed This Encounter   Procedures    CT Abdomen Pelvis Without Contrast    [place] Draw     Comprehensive Metabolic Panel    Lipase    Urinalysis With Microscopic If Indicated (No Culture) - Urine, Clean Catch    CBC Auto Differential    Soap suds enema    [facility] (on-call MD unless specified) Details    Inpatient Case Management  Consult    Insert Peripheral IV    Initiate Observation Status    CBC & Differential    Green Top (Gel)    Lavender Top    Gold Top - SST    Light Blue Top         Differential diagnosis include, but not limited to: ***      Additional orders considered but not ordered: ***    Independent interpretation of labs, radiology studies, and discussions with consultants:    Discussed with  ***, who agrees with plan.     ED Course as of 03/12/25 1540   Wed Mar 12, 2025   0813 Patient's laboratory evaluation is normal.  No CT imaging is indicated. [TR]   0858 Patient has had 2 bowel movement, but calling out in abdominal pain/ cramping.  [JG]   1047 RN called and spoke with nursing facility, states that this is a significant change from him yesterday. He was not having pain and was able to walk around the hallway yesterday.  [JG]   1204 CT Abdomen Pelvis Without Contrast  My independent interpretation of the imaging study is constipation [TR]   1227 I updated the patient on current ED work up, including labs and imaging (if performed) with indication for admission. All questions and concerns addressed and ready for admit at this time.    [JG]   1245 Discussed with Dr. Bear, who agrees to admission. No further recommendations.  [JG]      ED Course User Index  [JG] Yelena Lopez APRN  [TR] Livan Moss MD             DIAGNOSIS  Final diagnoses:   Intractable abdominal pain   Constipation, unspecified constipation type   Abnormal CT of the abdomen   Closed fracture of eleventh thoracic vertebra, unspecified fracture morphology, initial encounter         DISPOSITION  ***        Latest Documented Vital Signs:  As of 15:40 EDT  BP- 131/76 HR- 70 Temp- 98.1 °F  (36.7 °C) (Oral) O2 sat- 95%              --    Please note that portions of this were completed with a voice recognition program.       Note Disclaimer: At [facility], we believe that sharing information builds trust and better relationships. You are receiving this note because you are receiving care at [facility] or recently visited. It is possible you will see health information before a provider has talked with you about it. This kind of information can be easy to misunderstand. To help you fully understand what it means for your health, we urge you to discuss this note with your provider.

## 2025-03-12 NOTE — PLAN OF CARE
Goal Outcome Evaluation:    A&OX4, calm and cooperative. Admit from ED- very drowsy after receiving pain medication in ED. Purwick in place, ACHS no coverage needed. Diabetic diet refused dinner. Transferred from [facility]- for constipation. Enema given in ED- resolved. Brief in place. VSS on room air. WCTM

## 2025-03-12 NOTE — H&P
HISTORY AND PHYSICAL   [facility]        Patient Identification:  Name: Singh Hatch  Age: 74 y.o.  Sex: male  :  1950  MRN: [id number]                     Primary Care Physician: Salomon Mcknight MD    Chief Complaint: Abdominal pain    History of Present Illness:       The patient  is a 74 y.o. male with a medical history of Parkinson's disease, dementia, type 2 diabetes who presents to the hospital complaining of abdominal pain and constipation.  The patient reports that his last bowel movement was 4 days ago.  He reports generalized abdominal pain.  He has had several recent visits to the ER for abdominal pain and constipation.  Patient again was evaluated in the ER and medicine was asked to admit for further evaluation treatment.  Patient is pretty demented and is difficult to get much history out of him.    Past Medical History:  Past Medical History:   Diagnosis Date    Age-related osteoporosis without current pathological fracture     Atherosclerotic heart disease of native coronary artery without angina pectoris     Essential (primary) hypertension     Irritant contact dermatitis due to fecal, urinary or dual incontinence     Long term (current) use of insulin     Mixed hyperlipidemia     Other cervical disc degeneration, unspecified cervical region     Other intervertebral disc degeneration, 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     Other obstructive and reflux uropathy     Other specified anxiety disorders     Parkinson's disease without dyskinesia, without mention of fluctuations     Personal history of malignant neoplasm of prostate     Type 2 diabetes mellitus with hyperglycemia     Unspecified dementia, unspecified severity, without behavioral disturbance, psychotic disturbance, mood disturbance, and anxiety     Vitamin D deficiency, unspecified      Past Surgical History:  History reviewed. No pertinent surgical history.   Home Meds:  Medications Prior to Admission    Medication Sig Dispense Refill Last Dose/Taking    aspirin 81 MG EC tablet Take 1 tablet by mouth Daily.   3/11/2025    bisacodyl (Dulcolax) 10 MG suppository Insert 1 suppository into the rectum Daily As Needed for Constipation. 28 suppository 0 Taking As Needed    carbidopa-levodopa (SINEMET)  MG per tablet Take 1.5 tablets by mouth 4 (Four) Times a Day.   3/11/2025    carbidopa-levodopa CR (SINEMET CR)  MG per CR tablet Take 1 tablet by mouth Every Night. Give at 2300   3/11/2025    cephalexin (KEFLEX) 500 MG capsule Take 1 capsule by mouth 2 (Two) Times a Day for 7 days. (Patient taking differently: Take 1 capsule by mouth 2 (Two) Times a Day. Ends 3/13/25) 14 capsule 0 3/11/2025    dicyclomine (BENTYL) 20 MG tablet Take 1 tablet by mouth Every 6 (Six) Hours. 20 tablet 0 3/12/2025    gabapentin (NEURONTIN) 300 MG capsule Take 1 capsule by mouth 2 (Two) Times a Day.   3/11/2025    insulin glargine (LANTUS, SEMGLEE) 100 UNIT/ML injection Inject 40 Units under the skin into the appropriate area as directed Every Night.   3/11/2025    Insulin Lispro (humaLOG) 100 UNIT/ML injection Inject 2-10 Units under the skin into the appropriate area as directed 3 (Three) Times a Day Before Meals. -199=2 unit,200-249=4 unit,250-299=6 unit,300-349=8 unit,350-400=10 unit,>400= call MD   3/11/2025    miconazole (MICOTIN) 2 % cream Apply 1 Application topically to the appropriate area as directed 2 (Two) Times a Day. Clean scrotum with soap and water and apply miconazole to area r/t yeast   3/11/2025    mirtazapine (REMERON) 30 MG tablet Take 1.5 tablets by mouth Every Night.   3/11/2025    polyethylene glycol (MIRALAX) 17 GM/SCOOP powder Mix one capful (17 g) in liquid and take by mouth Daily. 510 g 0 3/11/2025    sennosides-docusate (senna-docusate sodium) 8.6-50 MG per tablet Take 1 tablet by mouth Daily for 30 days. 30 tablet 0 3/11/2025    sertraline (ZOLOFT) 25 MG tablet Take 3 tablets by mouth Daily.    3/11/2025    vitamin B-12 (CYANOCOBALAMIN) 1000 MCG tablet Take 1 tablet by mouth Daily.   3/11/2025    vitamin D (ERGOCALCIFEROL) 1.25 MG (48066 UT) capsule capsule Take 1 capsule by mouth 1 (One) Time Per Week. Wednesdays   Past Week    dextrose (GLUTOSE) 40 % gel Take 15 g by mouth Every 1 (One) Hour As Needed for Low Blood Sugar.       glucagon, human recombinant, (GLUCAGEN DIAGNOSTIC) 1 MG injection Infuse 1 mg into a venous catheter 1 (One) Time.        Current meds    Current Facility-Administered Medications:     sodium chloride 0.9 % flush 10 mL, 10 mL, Intravenous, PRN, Gettelfinger, Yelena, APRJOSUÉ  Allergies:  No Known Allergies  Immunizations:  Immunization History   Administered Date(s) Administered    COVID-19 (PFIZER) 12YRS+ (COMIRNATY) 03/01/2022    COVID-19 (PFIZER) BIVALENT 12+YRS 03/08/2023    COVID-19 (PFIZER) Purple Cap Monovalent 03/16/2021, 04/06/2021     Social History:   Social History     Social History Narrative    Not on file     Social History     Socioeconomic History    Marital status:    Tobacco Use    Smoking status: Never     Passive exposure: Never    Smokeless tobacco: Never   Vaping Use    Vaping status: Never Used   Substance and Sexual Activity    Alcohol use: Never    Drug use: Not Currently    Sexual activity: Never     Birth control/protection: Abstinence       Family History:  History reviewed. No pertinent family history.     Review of Systems  See history of present illness and past medical history.  Patient denies headache, dizziness, syncope, falls, trauma, change in vision, change in hearing, change in taste, changes in weight, changes in appetite, focal weakness, numbness, or paresthesia.  Patient denies chest pain, palpitations, dyspnea, orthopnea, PND, cough, sinus pressure, rhinorrhea, epistaxis, hemoptysis, nausea, vomiting, hematemesis, diarrhea, constipation or hematochezia. Denies cold or heat intolerance, polydipsia, polyuria, polyphagia. Denies  "hematuria, pyuria, dysuria, hesitancy, frequency or urgency. Denies consumption of raw and under cooked meats foods or change in water source.  Denies fever, chills, sweats, night sweats.  Denies missing any routine medications. Remainder of ROS is negative.    Objective:  tMax 24 hrs: Temp (24hrs), Av.1 °F (36.7 °C), Min:98.1 °F (36.7 °C), Max:98.1 °F (36.7 °C)    Vitals Ranges:   Temp:  [98.1 °F (36.7 °C)] 98.1 °F (36.7 °C)  Heart Rate:  [70-81] 70  Resp:  [18] 18  BP: (124-161)/(72-90) 131/76      Exam:  /76   Pulse 70   Temp 98.1 °F (36.7 °C) (Oral)   Resp 18   Ht 180.3 cm (71\")   Wt 65.8 kg (145 lb)   SpO2 95%   BMI 20.22 kg/m²     General Appearance:    Alert, cooperative, no distress, appears stated age   Head:    Normocephalic, without obvious abnormality, atraumatic   Eyes:    PERRL, conjunctivae/corneas clear, EOM's intact, both eyes   Ears:    Normal external ear canals, both ears   Nose:   Nares normal, septum midline, mucosa normal, no drainage    or sinus tenderness   Throat:   Lips, mucosa, and tongue normal   Neck:   Supple, symmetrical, trachea midline, no adenopathy;     thyroid:  no enlargement/tenderness/nodules; no carotid    bruit or JVD   Back:     Symmetric, no curvature, ROM normal, no CVA tenderness   Lungs:     Clear to auscultation bilaterally, respirations unlabored   Chest Wall:    No tenderness or deformity    Heart:    Regular rate and rhythm, S1 and S2 normal, no murmur, rub   or gallop   Abdomen:     Soft, nontender, bowel sounds active all four quadrants,     no masses, no hepatomegaly, no splenomegaly   Extremities:   Extremities normal, atraumatic, no cyanosis or edema   Pulses:   2+ and symmetric all extremities   Skin:   Skin color, texture, turgor normal, no rashes or lesions   Lymph nodes:   Cervical, supraclavicular, and axillary nodes normal   Neurologic:   CNII-XII intact, normal strength, sensation intact throughout      .    Data Review:  Lab Results " (last 72 hours)       Procedure Component Value Units Date/Time    Comprehensive Metabolic Panel [[id number]]  (Abnormal) Collected: 03/12/25 0710    Specimen: Blood Updated: 03/12/25 0744     Glucose 70 mg/dL      BUN 21 mg/dL      Creatinine 1.15 mg/dL      Sodium 142 mmol/L      Potassium 3.7 mmol/L      Chloride 107 mmol/L      CO2 26.7 mmol/L      Calcium 9.0 mg/dL      Total Protein 6.6 g/dL      Albumin 3.8 g/dL      ALT (SGPT) 7 U/L      AST (SGOT) 11 U/L      Alkaline Phosphatase 123 U/L      Total Bilirubin 0.2 mg/dL      Globulin 2.8 gm/dL      A/G Ratio 1.4 g/dL      BUN/Creatinine Ratio 18.3     Anion Gap 8.3 mmol/L      eGFR 67.6 mL/min/1.73     Narrative:      GFR Categories in Chronic Kidney Disease (CKD)      GFR Category          GFR (mL/min/1.73)    Interpretation  G1                     90 or greater         Normal or high (1)  G2                      60-89                Mild decrease (1)  G3a                   45-59                Mild to moderate decrease  G3b                   30-44                Moderate to severe decrease  G4                    15-29                Severe decrease  G5                    14 or less           Kidney failure          (1)In the absence of evidence of kidney disease, neither GFR category G1 or G2 fulfill the criteria for CKD.    eGFR calculation 2021 CKD-EPI creatinine equation, which does not include race as a factor    Lipase [[id number]]  (Normal) Collected: 03/12/25 0710    Specimen: Blood Updated: 03/12/25 0744     Lipase 17 U/L     CBC & Differential [[id number]]  (Abnormal) Collected: 03/12/25 0710    Specimen: Blood Updated: 03/12/25 0734    Narrative:      The following orders were created for panel order CBC & Differential.  Procedure                               Abnormality         Status                     ---------                               -----------         ------                     CBC Auto Differential[[id number]]        Abnormal             Final result                 Please view results for these tests on the individual orders.    CBC Auto Differential [[id number]]  (Abnormal) Collected: 03/12/25 0710    Specimen: Blood Updated: 03/12/25 0734     WBC 9.30 10*3/mm3      RBC 4.21 10*6/mm3      Hemoglobin 12.8 g/dL      Hematocrit 37.7 %      MCV 89.5 fL      MCH 30.4 pg      MCHC 34.0 g/dL      RDW 13.5 %      RDW-SD 44.3 fl      MPV 9.4 fL      Platelets 235 10*3/mm3      Neutrophil % 69.6 %      Lymphocyte % 16.3 %      Monocyte % 12.9 %      Eosinophil % 0.6 %      Basophil % 0.3 %      Immature Grans % 0.3 %      Neutrophils, Absolute 6.46 10*3/mm3      Lymphocytes, Absolute 1.52 10*3/mm3      Monocytes, Absolute 1.20 10*3/mm3      Eosinophils, Absolute 0.06 10*3/mm3      Basophils, Absolute 0.03 10*3/mm3      Immature Grans, Absolute 0.03 10*3/mm3      nRBC 0.0 /100 WBC     [place] Draw [[id number]] Collected: 03/12/25 0710    Specimen: Blood Updated: 03/12/25 0730    Narrative:      The following orders were created for panel order [place] Draw.  Procedure                               Abnormality         Status                     ---------                               -----------         ------                     Green Top (Gel)[[id number]]                                  Final result               Lavender Top[[id number]]                                     Final result               Gold Top - SST[[id number]]                                   Final result               Light Blue Top[[id number]]                                   Final result                 Please view results for these tests on the individual orders.    Green Top (Gel) [[id number]] Collected: 03/12/25 0710    Specimen: Blood Updated: 03/12/25 0730     Extra Tube Hold for add-ons.     Comment: Auto resulted.       Lavender Top [[id number]] Collected: 03/12/25 0710    Specimen: Blood Updated: 03/12/25 0730     Extra Tube hold for add-on     Comment: Auto resulted       Gold Top -  SST [[id number]] Collected: 03/12/25 0710    Specimen: Blood Updated: 03/12/25 0730     Extra Tube Hold for add-ons.     Comment: Auto resulted.       Light Blue Top [[id number]] Collected: 03/12/25 0710    Specimen: Blood Updated: 03/12/25 0730     Extra Tube Hold for add-ons.     Comment: Auto resulted                        Imaging Results (All)       Procedure Component Value Units Date/Time    CT Abdomen Pelvis Without Contrast [[id number]] Collected: 03/12/25 1159     Updated: 03/12/25 1227    Narrative:      CT ABDOMEN PELVIS WO CONTRAST-     INDICATION: Abdominal pain     COMPARISON: CT abdomen pelvis March 4, 2025     TECHNIQUE:  Routine CT abdomen/pelvis without IV contrast. Coronal and sagittal  reformats. Radiation dose reduction techniques were utilized, including  automated exposure control and exposure modulation based on body size.     FINDINGS:      Lung bases: Bibasilar lung opacities, suspect atelectasis. Coronary  artery atherosclerotic calcifications. Borderline cardiomegaly.  Gynecomastia.     ABDOMEN: Small cyst seen in segment 4A of the left hepatic lobe. Normal  gallbladder. No biliary ductal dilatation. Spleen is normal in size.  Multicystic lesion seen in the pancreatic tail, series 3, axial mage 45,  measures approximately 4.7 x 2.2 cm. Mild pancreatic atrophy. No  pancreatic ductal dilatation seen. No adrenal nodules. Symmetric  perinephric edema. Small fluid attenuating right renal cysts. No  urolithiasis. No hydronephrosis.     Pelvis: Prostatectomy. Unremarkable bladder. No bladder calculus.     Bowel: No small bowel obstruction. Colonic diverticulosis. Minimal  stranding seen adjacent to a diverticulum in the left mid descending  colon, series 3, axial mage 73, without extraluminal gas or pericolonic  fluid collection. Large amount of gas and stool seen in the rectum, with  distention. Normal appendix.     Abdominal wall: Pelvic wall scarring.     Retroperitoneum: No lymphadenopathy.      Vasculature: Mild aortoiliac atherosclerotic calcification. No abdominal  aortic aneurysm.     Osseous structures: Old right-sided rib fractures. Posterior lumbosacral  fusion extends from L3 to through the sacroiliac joints. Anterior lumbar  interbody fusion at L5/S1. Stable fracture seen along the right lateral  margin of the T11 inferior endplate on series 3, axial mage 43, with  involvement of the anterior column and without extension in the middle  or posterior column, appears new from CT performed February 24, 2025.       Impression:         1. Colonic diverticulosis. Minimal stranding seen adjacent to a  diverticulum in the descending colon questionable for a very early  diverticulitis.   2. Large amount of gas and stool seen in the rectum, with distention,  unchanged.  3. 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  Nonemergent pancreas MRI could better characterize.  4. Stable inferior endplate fracture along the right margin of the T11  vertebral body that appears new from CT performed February 24, 2025     This report was finalized on 3/12/2025 12:24 PM by Dr. 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     Other obstructive and reflux uropathy     Other specified anxiety disorders     Parkinson's disease without dyskinesia, without mention of fluctuations     Personal history of malignant neoplasm of prostate     Type 2 diabetes mellitus with hyperglycemia      Unspecified dementia, unspecified severity, without behavioral disturbance, psychotic disturbance, mood disturbance, and anxiety     Vitamin D deficiency, unspecified        Assessment:  Active Hospital Problems    Diagnosis  POA    **Intractable abdominal pain [R10.9]  Yes    Constipation [K59.00]  Unknown    Type 2 diabetes mellitus with hyperglycemia, with long-term current use of insulin [E11.65, Z79.4]  Not Applicable    Parkinson disease [G20.A1]  Yes    Dementia [F03.90]  Yes      Resolved Hospital Problems   No resolved problems to display.       Plan:  The patient admitted to the hospital and will ask for GI consult.  Will continue with some laxatives as needed and follow-up on labs.  Will ask for PT and OT eval.    Gary Bear MD  3/12/2025  16:08 EDT

## 2025-03-12 NOTE — ED NOTES
Patient to ED via EMS from [facility] ([facility]) for generalized abdominal pain x several weeks. Patient denies NV/D.    Patient is alert and oriented x4. Patient is nonambulatory and uses a wheelchair.

## 2025-03-12 NOTE — ED PROVIDER NOTES
EMERGENCY DEPARTMENT MD ATTESTATION NOTE    Room Number:  P384/1  PCP: Salomon Mcknight MD  Independent Historians: Patient and EMS    HPI:  A complete HPI/ROS/PMH/PSH/SH/FH are unobtainable due to: None    Chronic or social conditions impacting patient care (Social Determinants of Health): None      Context: Singh Hatch is a 74 y.o. male with a medical history of Parkinson's disease, dementia, type 2 diabetes who presents to the ED c/o acute abdominal pain and constipation.  The patient reports that his last bowel movement was 4 days ago.  He reports generalized abdominal pain.  He has had several recent visits here for abdominal pain and constipation.        Review of prior external notes (non-ED) -and- Review of prior external test results outside of this encounter:  Pelvis dated 3/4/2025 showed constipation versus impaction    Prescription drug monitoring program review:           PHYSICAL EXAM    I have reviewed the triage vital signs and nursing notes.    ED Triage Vitals   Temp Heart Rate Resp BP SpO2   03/12/25 0654 03/12/25 0654 03/12/25 0656 03/12/25 0654 03/12/25 0654   98.1 °F (36.7 °C) 81 18 137/77 95 %      Temp src Heart Rate Source Patient Position BP Location FiO2 (%)   03/12/25 0654 -- -- -- --   Oral           Physical Exam  GENERAL: Awake, alert, no acute distress, chronically ill-appearing  SKIN: Warm, dry  HENT: Normocephalic, atraumatic  EYES: no scleral icterus  CV: regular rhythm, regular rate  RESPIRATORY: normal effort, lungs clear  ABDOMEN: soft, nontender, nondistended  MUSCULOSKELETAL: no deformity  NEURO: alert, moves all extremities, follows commands            MEDICATIONS GIVEN IN ER  Medications   sodium chloride 0.9 % flush 10 mL (has no administration in time range)   ketorolac (TORADOL) injection 15 mg (15 mg Intravenous Given 3/12/25 0914)   dicyclomine (BENTYL) capsule 10 mg (10 mg Oral Given 3/12/25 0917)         ORDERS PLACED DURING THIS VISIT:  Orders Placed This  Encounter   Procedures    CT Abdomen Pelvis Without Contrast    [place] Draw    Comprehensive Metabolic Panel    Lipase    Urinalysis With Microscopic If Indicated (No Culture) - Urine, Clean Catch    CBC Auto Differential    Soap suds enema    [facility] (on-call MD unless specified) Details    Insert Peripheral IV    Initiate Observation Status    CBC & Differential    Green Top (Gel)    Lavender Top    Gold Top - SST    Light Blue Top         PROCEDURES  Procedures            PROGRESS, DATA ANALYSIS, CONSULTS, AND MEDICAL DECISION MAKING  All labs have been independently interpreted by me.  All radiology studies have been reviewed by me. All EKG's have been independently viewed and interpreted by me.  Discussion below represents my analysis of pertinent findings related to patient's condition, differential diagnosis, treatment plan and final disposition.    Differential diagnosis includes but is not limited to constipation, bowel obstruction, chronic abdominal pain.    Clinical Scores:                                     ED Course as of 03/12/25 1454   Wed Mar 12, 2025   0813 Patient's laboratory evaluation is normal.  No CT imaging is indicated. [TR]   0858 Patient has had 2 bowel movement, but calling out in abdominal pain/ cramping.  [JG]   1047 RN called and spoke with nursing facility, states that this is a significant change from him yesterday. He was not having pain and was able to walk around the hallway yesterday.  [JG]   1204 CT Abdomen Pelvis Without Contrast  My independent interpretation of the imaging study is constipation [TR]   1227 I updated the patient on current ED work up, including labs and imaging (if performed) with indication for admission. All questions and concerns addressed and ready for admit at this time.    [JG]   1245 Discussed with Dr. Bear, who agrees to admission. No further recommendations.  [JG]      ED Course User Index  [JG] Yelena Lopez APRN  [TR] Livan Moss MD        MDM: The patient has had multiple recent visits for abdominal pain and constipation.  He does have Parkinson's and is on multiple medications for bowel regimen.  Last time he was here he responded well to an enema.  We will check his laboratory values.  If there are no significant abnormalities and he probably does not need imaging at this time as he has been imaged for the last several times he has been here.      COMPLEXITY OF CARE  The patient requires admission.    Please note that portions of this document were completed with a voice recognition program.    Note Disclaimer: At [facility], we believe that sharing information builds trust and better relationships. You are receiving this note because you recently visited [facility]. It is possible you will see health information before a provider has talked with you about it. This kind of information can be easy to misunderstand. To help you fully understand what it means for your health, we urge you to discuss this note with your provider.         Livan Moss MD  03/12/25 3952

## 2025-03-13 LAB
ANION GAP SERPL CALCULATED.3IONS-SCNC: 9.2 MMOL/L (ref 5–15)
BASOPHILS # BLD AUTO: 0.03 10*3/MM3 (ref 0–0.2)
BASOPHILS NFR BLD AUTO: 0.3 % (ref 0–1.5)
BUN SERPL-MCNC: 18 MG/DL (ref 8–23)
BUN/CREAT SERPL: 15.9 (ref 7–25)
CALCIUM SPEC-SCNC: 8.7 MG/DL (ref 8.6–10.5)
CANCER AG19-9 SERPL-ACNC: 8.8 U/ML
CHLORIDE SERPL-SCNC: 109 MMOL/L (ref 98–107)
CO2 SERPL-SCNC: 26.8 MMOL/L (ref 22–29)
CREAT SERPL-MCNC: 1.13 MG/DL (ref 0.76–1.27)
DEPRECATED RDW RBC AUTO: 43.5 FL (ref 37–54)
EGFRCR SERPLBLD CKD-EPI 2021: 69.1 ML/MIN/1.73
EOSINOPHIL # BLD AUTO: 0.05 10*3/MM3 (ref 0–0.4)
EOSINOPHIL NFR BLD AUTO: 0.5 % (ref 0.3–6.2)
ERYTHROCYTE [DISTWIDTH] IN BLOOD BY AUTOMATED COUNT: 13.3 % (ref 12.3–15.4)
GLUCOSE BLDC GLUCOMTR-MCNC: 137 MG/DL (ref 70–130)
GLUCOSE BLDC GLUCOMTR-MCNC: 160 MG/DL (ref 70–130)
GLUCOSE BLDC GLUCOMTR-MCNC: 241 MG/DL (ref 70–130)
GLUCOSE BLDC GLUCOMTR-MCNC: 99 MG/DL (ref 70–130)
GLUCOSE SERPL-MCNC: 113 MG/DL (ref 65–99)
HCT VFR BLD AUTO: 38.7 % (ref 37.5–51)
HGB BLD-MCNC: 13 G/DL (ref 13–17.7)
IMM GRANULOCYTES # BLD AUTO: 0.04 10*3/MM3 (ref 0–0.05)
IMM GRANULOCYTES NFR BLD AUTO: 0.4 % (ref 0–0.5)
LYMPHOCYTES # BLD AUTO: 1.88 10*3/MM3 (ref 0.7–3.1)
LYMPHOCYTES NFR BLD AUTO: 18.3 % (ref 19.6–45.3)
MCH RBC QN AUTO: 30 PG (ref 26.6–33)
MCHC RBC AUTO-ENTMCNC: 33.6 G/DL (ref 31.5–35.7)
MCV RBC AUTO: 89.2 FL (ref 79–97)
MONOCYTES # BLD AUTO: 1.28 10*3/MM3 (ref 0.1–0.9)
MONOCYTES NFR BLD AUTO: 12.5 % (ref 5–12)
NEUTROPHILS NFR BLD AUTO: 6.98 10*3/MM3 (ref 1.7–7)
NEUTROPHILS NFR BLD AUTO: 68 % (ref 42.7–76)
NRBC BLD AUTO-RTO: 0 /100 WBC (ref 0–0.2)
PLATELET # BLD AUTO: 229 10*3/MM3 (ref 140–450)
PMV BLD AUTO: 9.4 FL (ref 6–12)
POTASSIUM SERPL-SCNC: 4.1 MMOL/L (ref 3.5–5.2)
RBC # BLD AUTO: 4.34 10*6/MM3 (ref 4.14–5.8)
SODIUM SERPL-SCNC: 145 MMOL/L (ref 136–145)
WBC NRBC COR # BLD AUTO: 10.26 10*3/MM3 (ref 3.4–10.8)

## 2025-03-13 PROCEDURE — G0378 HOSPITAL OBSERVATION PER HR: HCPCS

## 2025-03-13 PROCEDURE — 99204 OFFICE O/P NEW MOD 45 MIN: CPT | Performed by: PHYSICIAN ASSISTANT

## 2025-03-13 PROCEDURE — 80048 BASIC METABOLIC PNL TOTAL CA: CPT | Performed by: HOSPITALIST

## 2025-03-13 PROCEDURE — 82948 REAGENT STRIP/BLOOD GLUCOSE: CPT

## 2025-03-13 PROCEDURE — 85025 COMPLETE CBC W/AUTO DIFF WBC: CPT | Performed by: HOSPITALIST

## 2025-03-13 PROCEDURE — 97162 PT EVAL MOD COMPLEX 30 MIN: CPT

## 2025-03-13 PROCEDURE — 97535 SELF CARE MNGMENT TRAINING: CPT

## 2025-03-13 PROCEDURE — 97166 OT EVAL MOD COMPLEX 45 MIN: CPT

## 2025-03-13 PROCEDURE — 63710000001 INSULIN LISPRO (HUMAN) PER 5 UNITS: Performed by: HOSPITALIST

## 2025-03-13 PROCEDURE — 87481 CANDIDA DNA AMP PROBE: CPT | Performed by: HOSPITALIST

## 2025-03-13 PROCEDURE — 25010000002 MORPHINE PER 10 MG: Performed by: NURSE PRACTITIONER

## 2025-03-13 PROCEDURE — 97530 THERAPEUTIC ACTIVITIES: CPT

## 2025-03-13 RX ORDER — POLYETHYLENE GLYCOL 3350 17 G/17G
17 POWDER, FOR SOLUTION ORAL 2 TIMES DAILY
Status: DISCONTINUED | OUTPATIENT
Start: 2025-03-13 | End: 2025-03-15 | Stop reason: HOSPADM

## 2025-03-13 RX ORDER — AMOXICILLIN 250 MG
2 CAPSULE ORAL 2 TIMES DAILY
Status: DISCONTINUED | OUTPATIENT
Start: 2025-03-13 | End: 2025-03-15 | Stop reason: HOSPADM

## 2025-03-13 RX ORDER — MORPHINE SULFATE 2 MG/ML
2 INJECTION, SOLUTION INTRAMUSCULAR; INTRAVENOUS ONCE
Status: COMPLETED | OUTPATIENT
Start: 2025-03-13 | End: 2025-03-13

## 2025-03-13 RX ADMIN — Medication 10 ML: at 21:39

## 2025-03-13 RX ADMIN — SERTRALINE HYDROCHLORIDE 75 MG: 50 TABLET, FILM COATED ORAL at 08:04

## 2025-03-13 RX ADMIN — CARBIDOPA AND LEVODOPA 1.5 TABLET: 25; 250 TABLET ORAL at 17:06

## 2025-03-13 RX ADMIN — INSULIN LISPRO 3 UNITS: 100 INJECTION, SOLUTION INTRAVENOUS; SUBCUTANEOUS at 17:06

## 2025-03-13 RX ADMIN — HYDROCODONE BITARTRATE AND ACETAMINOPHEN 1 TABLET: 5; 325 TABLET ORAL at 18:38

## 2025-03-13 RX ADMIN — INSULIN LISPRO 2 UNITS: 100 INJECTION, SOLUTION INTRAVENOUS; SUBCUTANEOUS at 21:38

## 2025-03-13 RX ADMIN — SENNOSIDES AND DOCUSATE SODIUM 2 TABLET: 50; 8.6 TABLET ORAL at 09:53

## 2025-03-13 RX ADMIN — POLYETHYLENE GLYCOL 3350 17 G: 17 POWDER, FOR SOLUTION ORAL at 09:53

## 2025-03-13 RX ADMIN — MIRTAZAPINE 45 MG: 15 TABLET, FILM COATED ORAL at 21:37

## 2025-03-13 RX ADMIN — Medication 10 ML: at 08:05

## 2025-03-13 RX ADMIN — MORPHINE SULFATE 2 MG: 2 INJECTION, SOLUTION INTRAMUSCULAR; INTRAVENOUS at 02:50

## 2025-03-13 RX ADMIN — CARBIDOPA AND LEVODOPA 1.5 TABLET: 25; 250 TABLET ORAL at 08:05

## 2025-03-13 RX ADMIN — CARBIDOPA AND LEVODOPA 1 TABLET: 50; 200 TABLET, EXTENDED RELEASE ORAL at 21:38

## 2025-03-13 RX ADMIN — SENNOSIDES AND DOCUSATE SODIUM 2 TABLET: 50; 8.6 TABLET ORAL at 21:38

## 2025-03-13 RX ADMIN — CARBIDOPA AND LEVODOPA 1.5 TABLET: 25; 250 TABLET ORAL at 11:51

## 2025-03-13 RX ADMIN — CARBIDOPA AND LEVODOPA 1.5 TABLET: 25; 250 TABLET ORAL at 21:37

## 2025-03-13 RX ADMIN — POLYETHYLENE GLYCOL 3350 17 G: 17 POWDER, FOR SOLUTION ORAL at 21:38

## 2025-03-13 NOTE — PLAN OF CARE
Goal Outcome Evaluation:  Plan of Care Reviewed With: patient           Outcome Evaluation: Pt seen for OT eval after admission 2/2 abdominal pain and constipation. Pt presents from facility where he states he dresses himself but staff assist with bathing. Pt also reports he primarily uses w/c as he staff prefer he have someone present when ambulating 2/2 high fall risk. Pt agreeable to mobilize despite 8/10 pain and able to walk to/from bathroom and complete ADLs standing sinkside. Pt mobilizing with CGAx1-2 using FWW. OT will con't to follow for stated goals and anticipate return to facility at d/c.    Anticipated Discharge Disposition (OT): assisted living

## 2025-03-13 NOTE — PLAN OF CARE
Goal Outcome Evaluation:           Progress: improving  Outcome Evaluation: Patient is alert and oriented but disoriented to time reoriented as needed, assist to feed and tolerating cc diet, coccyx red/blanchable wound care recommendations placed see note/orders, room air, ambulating to the bathroom assist x1 working with PT and up in the recliner as tolerated, large BM today refused enema, vitals stable, safety precautions in use, plan of care ongoing

## 2025-03-13 NOTE — THERAPY EVALUATION
Patient Name: Singh Hatch  : 1950    MRN: [id number]                              Today's Date: 3/13/2025       Admit Date: 3/12/2025    Visit Dx:     ICD-10-CM ICD-9-CM   1. Intractable abdominal pain  R10.9 789.00   2. Constipation, unspecified constipation type  K59.00 564.00   3. Abnormal CT of the abdomen  R93.5 793.6   4. Closed fracture of eleventh thoracic vertebra, unspecified fracture morphology, initial encounter  S22.846P 805.2     Patient Active Problem List   Diagnosis    Chronic indwelling Holland catheter    Dementia    Parkinson disease    Type 2 diabetes mellitus with hyperglycemia, with long-term current use of insulin    Acute metabolic encephalopathy    Intractable abdominal pain    Constipation     Past Medical History:   Diagnosis Date    Age-related osteoporosis without current pathological fracture     Atherosclerotic heart disease of native coronary artery without angina pectoris     Essential (primary) hypertension     Irritant contact dermatitis due to fecal, urinary or dual incontinence     Long term (current) use of insulin     Mixed hyperlipidemia     Other cervical disc degeneration, unspecified cervical region     Other intervertebral disc degeneration, 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     Other obstructive and reflux uropathy     Other specified anxiety disorders     Parkinson's disease without dyskinesia, without mention of fluctuations     Personal history of malignant neoplasm of prostate     Type 2 diabetes mellitus with hyperglycemia     Unspecified dementia, unspecified severity, without behavioral disturbance, psychotic disturbance, mood disturbance, and anxiety     Vitamin D deficiency, unspecified      History reviewed. No pertinent surgical history.   General Information       Row Name 25 1454          OT Time and Intention    Subjective Information no complaints  -CE     Document Type evaluation  -CE     Mode of Treatment co-treatment;physical  therapy;occupational therapy;other (see comments)  -CE     Patient Effort good  -CE     Comment Pt appropriate for co-tx 2/2 unknown level of assist and recent history of fall  -CE       Row Name 03/13/25 1454          General Information    Patient Profile Reviewed yes  -CE     Prior Level of Function min assist:;ADL's;mod assist:  Pt reports he has FWW and w/c but primarily uses w/c at facility as staff prefers he have someone with him when walking 2/2 fall risk. Pt states he dresses himself but staff assist with bathing.  -CE     Existing Precautions/Restrictions fall  -CE     Barriers to Rehab none identified  -CE       Row Name 03/13/25 1454          Living Environment    Current Living Arrangements assisted living facility  -CE     People in Home facility resident  -CE       Row Name 03/13/25 1454          Home Main Entrance    Number of Stairs, Main Entrance none  -CE       Row Name 03/13/25 1454          Stairs Within Home, Primary    Number of Stairs, Within Home, Primary none  -CE       Row Name 03/13/25 1454          Cognition    Orientation Status (Cognition) oriented x 3;disoriented to;time  -CE       Row Name 03/13/25 1454          Safety Issues/Impairments Affecting Functional Mobility    Safety Issues Affecting Function (Mobility) sequencing abilities  -CE     Impairments Affecting Function (Mobility) balance;endurance/activity tolerance;strength;motor control;pain  -CE               User Key  (r) = Recorded By, (t) = Taken By, (c) = Cosigned By      Initials Name Provider Type    CE Adrienne Reveles OT Occupational Therapist                     Mobility/ADL's       Row Name 03/13/25 1458          Bed Mobility    Bed Mobility supine-sit  -CE     Supine-Sit [place] (Bed Mobility) contact guard;standby assist  -CE     Assistive Device (Bed Mobility) bed rails  -CE       Row Name 03/13/25 1458          Transfers    Transfers sit-stand transfer  -CE       Row Name 03/13/25 1458          Sit-Stand  Transfer    Sit-Stand [place] (Transfers) contact guard;standby assist;1 person assist;2 person assist;verbal cues  -CE     Assistive Device (Sit-Stand Transfers) walker, front-wheeled  -CE       Row Name 03/13/25 1458          Functional Mobility    Functional Mobility- Comment Pt able to ambulate into bathroom with CGA-Alia x 1 with mod cues for positioning of AD.  -CE       Row Name 03/13/25 1458          Activities of Daily Living    BADL Assessment/Intervention grooming;lower body dressing  -CE       Row Name 03/13/25 1458          Grooming Assessment/Training    [place] Level (Grooming) hair care, combing/brushing;oral care regimen;set up;contact guard assist  -CE     Position (Grooming) sink side;supported standing  -CE       Row Name 03/13/25 1458          Lower Body Dressing Assessment/Training    [place] Level (Lower Body Dressing) doff;don;socks;standby assist  -CE     Position (Lower Body Dressing) edge of bed sitting  -CE               User Key  (r) = Recorded By, (t) = Taken By, (c) = Cosigned By      Initials Name Provider Type    CE Adrienne Reveles OT Occupational Therapist                   Obj/Interventions       Row Name 03/13/25 1500          Sensory Assessment (Somatosensory)    Sensory Assessment (Somatosensory) not tested  -CE       Row Name 03/13/25 1500          Range of Motion Comprehensive    Comment, General Range of Motion lacking full extension in B knees and grossly WFL BUEs  -CE       Row Name 03/13/25 1500          Strength Comprehensive (MMT)    Comment, General Manual Muscle Testing (MMT) Assessment pt with generalized weakness but grossly >/= 3+/5  -CE       Row Name 03/13/25 1500          Motor Skills    Motor Skills coordination  -CE     Coordination other (see comments)  pt with gross dyskinesia in neck/BUEs/trunk/LEs  -CE       Row Name 03/13/25 1500          Balance    Balance Assessment standing dynamic balance  -CE     Dynamic Standing Balance minimal  assist;1-person assist  -CE     Position/Device Used, Standing Balance walker, rolling  -CE               User Key  (r) = Recorded By, (t) = Taken By, (c) = Cosigned By      Initials Name Provider Type    CE Adrienne Reveles OT Occupational Therapist                   Goals/Plan       Row Name 03/13/25 1504          Bed Mobility Goal 1 (OT)    Activity/Assistive Device (Bed Mobility Goal 1, OT) bed mobility activities, all  -CE     [place] Level/Cues Needed (Bed Mobility Goal 1, OT) supervision required  -CE     Time Frame (Bed Mobility Goal 1, OT) short term goal (STG);2 weeks  -CE       Row Name 03/13/25 1504          Transfer Goal 1 (OT)    Activity/Assistive Device (Transfer Goal 1, OT) toilet;shower chair  -CE     [place] Level/Cues Needed (Transfer Goal 1, OT) standby assist  -CE     Time Frame (Transfer Goal 1, OT) short term goal (STG);2 weeks  -CE       Row Name 03/13/25 1504          Dressing Goal 1 (OT)    Activity/Device (Dressing Goal 1, OT) dressing skills, all  -CE     [place]/Cues Needed (Dressing Goal 1, OT) standby assist  -CE     Time Frame (Dressing Goal 1, OT) short term goal (STG);2 weeks  -CE       Row Name 03/13/25 1504          Toileting Goal 1 (OT)    Activity/Device (Toileting Goal 1, OT) toileting skills, all  -CE     [place] Level/Cues Needed (Toileting Goal 1, OT) standby assist  -CE     Time Frame (Toileting Goal 1, OT) short term goal (STG);2 weeks  -CE       Row Name 03/13/25 1504          Grooming Goal 1 (OT)    Activity/Device (Grooming Goal 1, OT) grooming skills, all  -CE     [place] (Grooming Goal 1, OT) standby assist  -CE     Time Frame (Grooming Goal 1, OT) short term goal (STG);2 weeks  -CE       Row Name 03/13/25 1504          Therapy Assessment/Plan (OT)    Planned Therapy Interventions (OT) activity tolerance training;BADL retraining;transfer/mobility retraining;strengthening exercise  -CE               User Key  (r) = Recorded By, (t) = Taken  By, (c) = Cosigned By      Initials Name Provider Type    CE Adrienne Reveles OT Occupational Therapist                   Clinical Impression       Row Name 03/13/25 1502          Pain Assessment    Pretreatment Pain Rating 8/10  -CE     Posttreatment Pain Rating 8/10  -CE     Pain Location abdomen  -CE     Pain Management Interventions nursing notified  -CE       Row Name 03/13/25 1502          Plan of Care Review    Plan of Care Reviewed With patient  -CE     Outcome Evaluation Pt seen for OT eval after admission 2/2 abdominal pain and constipation. Pt presents from facility where he states he dresses himself but staff assist with bathing. Pt also reports he primarily uses w/c as he staff prefer he have someone present when ambulating 2/2 high fall risk. Pt agreeable to mobilize despite 8/10 pain and able to walk to/from bathroom and complete ADLs standing sinkside. Pt mobilizing with CGAx1-2 using FWW. OT will con't to follow for stated goals and anticipate return to facility at d/c.  -CE       Row Name 03/13/25 1502          Therapy Assessment/Plan (OT)    Rehab Potential (OT) good  -CE     Criteria for Skilled Therapeutic Interventions Met (OT) yes  -CE     Therapy Frequency (OT) 3 times/wk  -CE       Row Name 03/13/25 1502          Therapy Plan Review/Discharge Plan (OT)    Anticipated Discharge Disposition (OT) assisted living  -CE       Row Name 03/13/25 1502          Vital Signs    O2 Delivery Pre Treatment room air  -CE     O2 Delivery Intra Treatment room air  -CE     O2 Delivery Post Treatment room air  -CE     Pre Patient Position Supine  -CE     Intra Patient Position Standing  -CE     Post Patient Position Sitting  -CE       Row Name 03/13/25 1502          Positioning and Restraints    Pre-Treatment Position in bed  -CE     Post Treatment Position chair  -CE     In Chair notified nsg;reclined;call light within reach;encouraged to call for assist;exit alarm on  -CE               User Key  (r) =  Recorded By, (t) = Taken By, (c) = Cosigned By      Initials Name Provider Type    CE Adrienne Reveles, MARCK Occupational Therapist                   Outcome Measures       Row Name 03/13/25 1505          How much help from another is currently needed...    Putting on and taking off regular lower body clothing? 3  -CE     Bathing (including washing, rinsing, and drying) 3  -CE     Toileting (which includes using toilet bed pan or urinal) 2  -CE     Putting on and taking off regular upper body clothing 3  -CE     Taking care of personal grooming (such as brushing teeth) 3  -CE     Eating meals 3  -CE     AM-PAC 6 Clicks Score (OT) 17  -CE       Row Name 03/13/25 1322 03/13/25 0840       How much help from another person do you currently need...    Turning from your back to your side while in flat bed without using bedrails? 3  -MG 3  -EH    Moving from lying on back to sitting on the side of a flat bed without bedrails? 3  -MG 3  -EH    Moving to and from a bed to a chair (including a wheelchair)? 3  -MG 2  -EH    Standing up from a chair using your arms (e.g., wheelchair, bedside chair)? 3  -MG 2  -EH    Climbing 3-5 steps with a railing? 2  -MG 2  -EH    To walk in hospital room? 3  -MG 2  -EH    AM-PAC 6 Clicks Score (PT) 17  -MG 14  -EH      Row Name 03/13/25 1505          Functional Assessment    Outcome Measure Options AM-PAC 6 Clicks Daily Activity (OT)  -CE               User Key  (r) = Recorded By, (t) = Taken By, (c) = Cosigned By      Initials Name Provider Type    Shonda Segovia, PT Physical Therapist    Cece Delgado, RN Registered Nurse    Adrienne Rick, MARCK Occupational Therapist                    Occupational Therapy Education       Title: PT OT SLP Therapies (In Progress)       Topic: Occupational Therapy (In Progress)       Point: ADL training (In Progress)       Learning Progress Summary            Patient Acceptance, E, NR by CE at 3/13/2025 1506                      Point:  Precautions (In Progress)       Learning Progress Summary            Patient Acceptance, E, NR by CE at 3/13/2025 1506                      Point: Body mechanics (In Progress)       Learning Progress Summary            Patient Acceptance, E, NR by CE at 3/13/2025 1506                                      User Key       Initials Effective Dates Name Provider Type Discipline    CE 10/17/22 -  Adrienne Reveles OT Occupational Therapist OT                  OT Recommendation and Plan  Planned Therapy Interventions (OT): activity tolerance training, BADL retraining, transfer/mobility retraining, strengthening exercise  Therapy Frequency (OT): 3 times/wk  Plan of Care Review  Plan of Care Reviewed With: patient  Outcome Evaluation: Pt seen for OT eval after admission 2/2 abdominal pain and constipation. Pt presents from facility where he states he dresses himself but staff assist with bathing. Pt also reports he primarily uses w/c as he staff prefer he have someone present when ambulating 2/2 high fall risk. Pt agreeable to mobilize despite 8/10 pain and able to walk to/from bathroom and complete ADLs standing sinkside. Pt mobilizing with CGAx1-2 using FWW. OT will con't to follow for stated goals and anticipate return to facility at d/c.     Time Calculation:   Evaluation Complexity (OT)  Review Occupational Profile/Medical/Therapy History Complexity: expanded/moderate complexity  Assessment, Occupational Performance/Identification of Deficit Complexity: 3-5 performance deficits  Clinical Decision Making Complexity (OT): detailed assessment/moderate complexity  Overall Complexity of Evaluation (OT): moderate complexity     Time Calculation- OT       Row Name 03/13/25 1507             Time Calculation- OT    OT Start Time 1009  -CE      OT Stop Time 1030  -CE      OT Time Calculation (min) 21 min  -CE      Total Timed Code Minutes- OT 10 minute(s)  -CE      OT Received On 03/13/25  -CE      OT - Next Appointment 03/17/25   -CE      OT Goal Re-Cert Due Date 03/27/25  -CE         Timed Charges    42944 - OT Self Care/Mgmt Minutes 10  -CE         Total Minutes    Timed Charges Total Minutes 10  -CE       Total Minutes 10  -CE                User Key  (r) = Recorded By, (t) = Taken By, (c) = Cosigned By      Initials Name Provider Type    CE Adrienne Reveles OT Occupational Therapist                  Therapy Charges for Today       Code Description Service Date Service Provider Modifiers Qty    [id number] HC OT SELF CARE/MGMT/TRAIN EA 15 MIN 3/13/2025 Adrienne Reveles OT GO 1    [id number]  OT EVAL MOD COMPLEXITY 2 3/13/2025 Adrienne Reveles OT GO 1                 Ardienne Reveles OT  3/13/2025

## 2025-03-13 NOTE — PLAN OF CARE
Goal Outcome Evaluation:  Plan of Care Reviewed With: patient        Progress: improving     Pt is a 74 y.o. male with h/o Parkinson's Disease, DM2 and dementia who was admitted to [facility] on 3/12/2025 with c/o acute abdominal pain and constipation. Patient requires Ax1 at baseline for ADLs/mobility, with use of a WC via stand pivot or RW for short distances and lives in an [facility]. Today, pt performed bed mobility with CG/SBA, required CG/SBA for transfers with a RW, and ambulated 4' to the chair, then 12' x2 chair<>bathroom with a RW and CG/[place] x1-2. Pt demos chronic B knee flexion, lacking approx 10 deg from full extension, and dyskinesia. Pt was mildly unsteady throughout, but no overt LOB or buckling. Pt may benefit from skilled PT services acutely to address their impaired strength, balance and endurance, as well as, improve their level of independence prior to discharge. Discussed w/ RN. Rec return to [facility] upon DC.      Anticipated Discharge Disposition (PT): assisted living (Return to custodial)

## 2025-03-13 NOTE — PLAN OF CARE
Goal Outcome Evaluation:         Patient is alert and oriented x 3 disoriented to time, tachy at times, x2 assist, coccyx red/blanchable with mepilex on, wound care consulted, room air, purewick in use, complaints of pain, see MAR for all medications administered, safety precautions in use, plan of care ongoing,

## 2025-03-13 NOTE — CONSULTS
Called and spoke with patient.  Provided patient with central scheduling phone number.  Patient is to call and schedule.   She is aware and verbalized understanding.    [facility], [place], operated by [facility]  Initial Inpatient Consult Note    Referring Provider: MD Chema    Reason for Consultation: Constipation, abdominal pain    Subjective     History of present illness:    74 y.o. male with a history of Parkinson's disease, dementia, type 2 diabetes mellitus who being asked to see for evaluation of abdominal pain and constipation.    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    Patient reports he does have a history of constipation and will often increase his fiber intake to help with it.  Typically will reach for prunes.  He does not use any fiber supplements or laxatives at home.  He reports he has been constipated for around the last 5 to 10 days.  Patient reports he did do Cologuard testing a few years ago that was reportedly negative.  He has had a previous colonoscopy which she reports was normal.  Could not tell me when he had this completed.  He does have a family history of colon cancer in his maternal grandfather.  He is passing flatus.  He is experiencing pain in the lower abdomen.  Originally it was a sharp pain but now he is describing it as a burning/numb pain.  He does have a history of neuropathy.    Past Medical History:  Past Medical History:   Diagnosis Date    Age-related osteoporosis without current pathological fracture     Atherosclerotic heart disease of native coronary artery without angina pectoris     Essential (primary) hypertension     Irritant contact dermatitis due to fecal, urinary or dual incontinence     Long term (current) use of insulin     Mixed hyperlipidemia     Other cervical disc degeneration, unspecified cervical region     Other intervertebral disc degeneration, lumbar region with  discogenic back pain only     Other obstructive and reflux uropathy     Other specified anxiety disorders     Parkinson's disease without dyskinesia, without mention of fluctuations     Personal history of malignant neoplasm of prostate     Type 2 diabetes mellitus with hyperglycemia     Unspecified dementia, unspecified severity, without behavioral disturbance, psychotic disturbance, mood disturbance, and anxiety     Vitamin D deficiency, unspecified      Past Surgical History:  History reviewed. No pertinent surgical history.   Social History:   Social History     Tobacco Use    Smoking status: Never     Passive exposure: Never    Smokeless tobacco: Never   Substance Use Topics    Alcohol use: Never      Family History:  History reviewed. No pertinent family history.    Home Meds:  Medications Prior to Admission   Medication Sig Dispense Refill Last Dose/Taking    aspirin 81 MG EC tablet Take 1 tablet by mouth Daily.   3/11/2025    bisacodyl (Dulcolax) 10 MG suppository Insert 1 suppository into the rectum Daily As Needed for Constipation. 28 suppository 0 Taking As Needed    carbidopa-levodopa (SINEMET)  MG per tablet Take 1.5 tablets by mouth 4 (Four) Times a Day.   3/11/2025    carbidopa-levodopa CR (SINEMET CR)  MG per CR tablet Take 1 tablet by mouth Every Night. Give at 2300   3/11/2025    [] cephalexin (KEFLEX) 500 MG capsule Take 1 capsule by mouth 2 (Two) Times a Day for 7 days. (Patient taking differently: Take 1 capsule by mouth 2 (Two) Times a Day. Ends 3/13/25) 14 capsule 0 3/11/2025    dicyclomine (BENTYL) 20 MG tablet Take 1 tablet by mouth Every 6 (Six) Hours. 20 tablet 0 3/12/2025    gabapentin (NEURONTIN) 300 MG capsule Take 1 capsule by mouth 2 (Two) Times a Day.   3/11/2025    insulin glargine (LANTUS, SEMGLEE) 100 UNIT/ML injection Inject 40 Units under the skin into the appropriate area as directed Every Night.   3/11/2025    Insulin Lispro (humaLOG) 100 UNIT/ML injection  Inject 2-10 Units under the skin into the appropriate area as directed 3 (Three) Times a Day Before Meals. -199=2 unit,200-249=4 unit,250-299=6 unit,300-349=8 unit,350-400=10 unit,>400= call MD   3/11/2025    miconazole (MICOTIN) 2 % cream Apply 1 Application topically to the appropriate area as directed 2 (Two) Times a Day. Clean scrotum with soap and water and apply miconazole to area r/t yeast   3/11/2025    mirtazapine (REMERON) 30 MG tablet Take 1.5 tablets by mouth Every Night.   3/11/2025    polyethylene glycol (MIRALAX) 17 GM/SCOOP powder Mix one capful (17 g) in liquid and take by mouth Daily. 510 g 0 3/11/2025    sennosides-docusate (senna-docusate sodium) 8.6-50 MG per tablet Take 1 tablet by mouth Daily for 30 days. 30 tablet 0 3/11/2025    sertraline (ZOLOFT) 25 MG tablet Take 3 tablets by mouth Daily.   3/11/2025    vitamin B-12 (CYANOCOBALAMIN) 1000 MCG tablet Take 1 tablet by mouth Daily.   3/11/2025    vitamin D (ERGOCALCIFEROL) 1.25 MG (44336 UT) capsule capsule Take 1 capsule by mouth 1 (One) Time Per Week. Wednesdays   Past Week    dextrose (GLUTOSE) 40 % gel Take 15 g by mouth Every 1 (One) Hour As Needed for Low Blood Sugar.       glucagon, human recombinant, (GLUCAGEN DIAGNOSTIC) 1 MG injection Infuse 1 mg into a venous catheter 1 (One) Time.        Current Meds:   carbidopa-levodopa, 1.5 tablet, Oral, 4x Daily  carbidopa-levodopa CR, 1 tablet, Oral, Nightly  insulin lispro, 2-7 Units, Subcutaneous, 4x Daily AC & at Bedtime  mirtazapine, 45 mg, Oral, Nightly  sertraline, 75 mg, Oral, Daily  sodium chloride, 10 mL, Intravenous, Q12H      Allergies:  No Known Allergies    Objective     Vital Signs  Temp:  [97 °F (36.1 °C)-98.1 °F (36.7 °C)] 97 °F (36.1 °C)  Heart Rate:  [59-79] 66  Resp:  [16-18] 16  BP: (124-161)/(70-90) 145/76  Physical Exam:  General Appearance:     Alert, cooperative, in no acute distress   Abdomen:     Normal bowel sounds, no masses, no organomegaly, soft      nontender, nondistended, no guarding, no rebound                 tenderness   Rectal:     Deferred       Results Review:   I reviewed the patient's new clinical results.  I reviewed the patient's new imaging results and agree with the interpretation.    Results from last 7 days   Lab Units 03/13/25  0656 03/12/25  0710   WBC 10*3/mm3 10.26 9.30   HEMOGLOBIN g/dL 13.0 12.8*   HEMATOCRIT % 38.7 37.7   PLATELETS 10*3/mm3 229 235     Results from last 7 days   Lab Units 03/13/25  0656 03/12/25  0710   SODIUM mmol/L 145 142   POTASSIUM mmol/L 4.1 3.7   CHLORIDE mmol/L 109* 107   CO2 mmol/L 26.8 26.7   BUN mg/dL 18 21   CREATININE mg/dL 1.13 1.15   CALCIUM mg/dL 8.7 9.0   BILIRUBIN mg/dL  --  0.2   ALK PHOS U/L  --  123*   ALT (SGPT) U/L  --  7   AST (SGOT) U/L  --  11   GLUCOSE mg/dL 113* 70         Lab Results   Lab Value Date/Time    LIPASE 17 03/12/2025 0710    LIPASE 17 03/04/2025 1744    LIPASE 20 02/24/2025 0554    LIPASE 18 02/20/2025 0944       Radiology:  CT Abdomen Pelvis Without Contrast   Final Result       1. Colonic diverticulosis. Minimal stranding seen adjacent to a   diverticulum in the descending colon questionable for a very early   diverticulitis.    2. Large amount of gas and stool seen in the rectum, with distention,   unchanged.   3. 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   Nonemergent pancreas MRI could better characterize.   4. Stable inferior endplate fracture along the right margin of the T11   vertebral body that appears new from CT performed February 24, 2025       This report was finalized on 3/12/2025 12:24 PM by Dr. Heri Stern M.D on Workstation: [id number]              Assessment & Plan   Assessment:   Abdominal pain  Constipation  Parkinson's  Diverticulosis with questionable area of early diverticulitis -no evidence of leukocytosis or fever  CT with multicystic appearing pancreas    Plan:   Possible early signs of diverticulitis but physical exam does not  align with this.  He denies any fevers or chills and has no evidence of leukocytosis.  Would hold off on antibiotics for now and monitor closely for any changes.  His current pain sounds consistent with neuropathy.  Patient reports he recently started on gabapentin which may have contributed to his constipation?  Start MiraLAX twice daily  Senokot twice daily  Enema x 1  Encourage ambulation and increased water intake.  Low residue diet  Recommend outpatient MRI to further evaluate cystic lesions of the pancreas.  Will likely need ongoing follow-up.    I discussed the patients findings and my recommendations with patient.    Dictated utilizing Dragon dictation.         Marti Strange PA-C   [facility]  [address] [place] [address]  Office: [phone]

## 2025-03-13 NOTE — CASE MANAGEMENT/SOCIAL WORK
Discharge Planning Assessment  [facility]     Patient Name: Singh Hatch  MRN: [id number]  Today's Date: 3/13/2025    Admit Date: 3/12/2025    Plan: Plan is to return to [facility]. Pt will need transport arranged.   Discharge Needs Assessment       Row Name 03/13/25 1439       Living Environment    People in Home facility resident    Primary Care Provided by other (see comments)  LTC resident    Family Caregiver if Needed none       Resource/Environmental Concerns    Transportation Concerns none       Transition Planning    Patient/Family Anticipates Transition to long-term care facility    Patient/Family Anticipated Services at Transition none    Transportation Anticipated other (see comments)  transport will need to be arranged       Discharge Needs Assessment    Readmission Within the Last 30 Days no previous admission in last 30 days    Current Outpatient/Agency/Support Group long-term acute care facility    Equipment Currently Used at Home walker, rolling    Concerns to be Addressed no discharge needs identified;denies needs/concerns at this time    Anticipated Changes Related to Illness none    Equipment Needed After Discharge none    Outpatient/Agency/Support Group Needs long term acute care facility                   Discharge Plan       Row Name 03/13/25 3398       Plan    Plan Plan is to return to [facility]. Pt will need transport arranged.    Patient/Family in Agreement with Plan yes    Plan Comments CCP s/w Kirti/An pt came from LTC at [facility]; Pt is on a LTC bedhold and can come back whenever he is ready for DC. CCP s/w Yasmin/pt's sister over the phone due to pt confusion. Introduced self and role. Facesheet information and pharmacy verified. Pt lives in LTC at [facility]. Pt does not drive; pt's friend drives him or [facility] provides transportation as needed. Pt ambulates with a walker at baseline. Pt has a walker. Pt has no history of using HH or SNF that [facility]  knows about.  Pt does not have a living will. Pt is not enrolled in [facility]. Yasmin denies pt has trouble affording or managing medications. Plan is to return [facility]. Pt will need transport arranged. CCP will continue to follow for medical DC readiness and any DC needs. Montrell RN/CCP                       Demographic Summary       Row Name 03/13/25 1433       General Information    Admission Type observation    Arrived From long-term [facility]    Required Notices Provided Observation Status Notice    Referral Source case finding;admission list    Preferred Language English       Contact Information    Permission Granted to Share Info With ;family/designee                   Functional Status       Row Name 03/13/25 1436       Functional Status    Usual Activity Tolerance moderate    Current Activity Tolerance fair       Physical Activity    On average, how many days per week do you engage in moderate to strenuous exercise (like a brisk walk)? Pt Declined    On average, how many minutes do you engage in exercise at this level? Pt Declined                          Gabriela Kirby RN

## 2025-03-13 NOTE — PROGRESS NOTES
"DAILY PROGRESS NOTE  [facility]    Patient Identification:  Name: Singh Hatch  Age: 74 y.o.  Sex: male  :  1950  MRN: [id number]         Primary Care Physician: Salomon Mcknight MD    Subjective:  Interval History: He is feeling better today.  He is having some bowel movements and abdominal discomfort is improving.    Objective:    Scheduled Meds:carbidopa-levodopa, 1.5 tablet, Oral, 4x Daily  carbidopa-levodopa CR, 1 tablet, Oral, Nightly  insulin lispro, 2-7 Units, Subcutaneous, 4x Daily AC & at Bedtime  mirtazapine, 45 mg, Oral, Nightly  polyethylene glycol, 17 g, Oral, BID  senna-docusate sodium, 2 tablet, Oral, BID  sertraline, 75 mg, Oral, Daily  sodium chloride, 10 mL, Intravenous, Q12H      Continuous Infusions:     Vital signs in last 24 hours:  Temp:  [97 °F (36.1 °C)-98.4 °F (36.9 °C)] 98.4 °F (36.9 °C)  Heart Rate:  [] 114  Resp:  [16-18] 18  BP: (130-145)/(70-92) 143/92    Intake/Output:    Intake/Output Summary (Last 24 hours) at 3/13/2025 1407  Last data filed at 3/13/2025 0615  Gross per 24 hour   Intake --   Output 500 ml   Net -500 ml       Exam:  /92 (BP Location: Left arm, Patient Position: Sitting)   Pulse 114   Temp 98.4 °F (36.9 °C) (Oral)   Resp 18   Ht 180.3 cm (71\")   Wt 65.8 kg (145 lb)   SpO2 98%   BMI 20.22 kg/m²     General Appearance:    Alert, cooperative, no distress   Head:    Normocephalic, without obvious abnormality, atraumatic   Eyes:       Throat:   Lips, tongue, gums normal   Neck:   Supple, symmetrical, trachea midline, no JVD   Lungs:     Clear to auscultation bilaterally, respirations unlabored   Chest Wall:    No tenderness or deformity    Heart:    Regular rate and rhythm, S1 and S2 normal, no murmur,no  rub or gallop   Abdomen:     Soft, nontender, bowel sounds active, no masses, no organomegaly    Extremities:   Extremities normal, atraumatic, no cyanosis or edema   Pulses:      Skin:   Skin is warm and dry,  no rashes or " palpable lesions   Neurologic:   no focal deficits noted      Lab Results (last 72 hours)       Procedure Component Value Units Date/Time    POC Glucose Once [[id number]]  (Abnormal) Collected: 03/13/25 1154    Specimen: Blood Updated: 03/13/25 1155     Glucose 137 mg/dL     [facility] PCR - Swab, Axilla Right, Axilla Left and Groin [[id number]] Collected: 03/13/25 0952    Specimen: Swab from Axilla Right, Axilla Left and Groin Updated: 03/13/25 0956    POC Glucose Once [[id number]]  (Normal) Collected: 03/13/25 0753    Specimen: Blood Updated: 03/13/25 0754     Glucose 99 mg/dL     Basic Metabolic Panel [[id number]]  (Abnormal) Collected: 03/13/25 0656    Specimen: Blood Updated: 03/13/25 0750     Glucose 113 mg/dL      BUN 18 mg/dL      Creatinine 1.13 mg/dL      Sodium 145 mmol/L      Potassium 4.1 mmol/L      Chloride 109 mmol/L      CO2 26.8 mmol/L      Calcium 8.7 mg/dL      BUN/Creatinine Ratio 15.9     Anion Gap 9.2 mmol/L      eGFR 69.1 mL/min/1.73     Narrative:      GFR Categories in Chronic Kidney Disease (CKD)      GFR Category          GFR (mL/min/1.73)    Interpretation  G1                     90 or greater         Normal or high (1)  G2                      60-89                Mild decrease (1)  G3a                   45-59                Mild to moderate decrease  G3b                   30-44                Moderate to severe decrease  G4                    15-29                Severe decrease  G5                    14 or less           Kidney failure          (1)In the absence of evidence of kidney disease, neither GFR category G1 or G2 fulfill the criteria for CKD.    eGFR calculation 2021 CKD-EPI creatinine equation, which does not include race as a factor    CBC & Differential [[id number]]  (Abnormal) Collected: 03/13/25 0656    Specimen: Blood Updated: 03/13/25 0733    Narrative:      The following orders were created for panel order CBC & Differential.  Procedure                                Abnormality         Status                     ---------                               -----------         ------                     CBC Auto Differential[[id number]]        Abnormal            Final result                 Please view results for these tests on the individual orders.    CBC Auto Differential [[id number]]  (Abnormal) Collected: 03/13/25 0656    Specimen: Blood Updated: 03/13/25 0733     WBC 10.26 10*3/mm3      RBC 4.34 10*6/mm3      Hemoglobin 13.0 g/dL      Hematocrit 38.7 %      MCV 89.2 fL      MCH 30.0 pg      MCHC 33.6 g/dL      RDW 13.3 %      RDW-SD 43.5 fl      MPV 9.4 fL      Platelets 229 10*3/mm3      Neutrophil % 68.0 %      Lymphocyte % 18.3 %      Monocyte % 12.5 %      Eosinophil % 0.5 %      Basophil % 0.3 %      Immature Grans % 0.4 %      Neutrophils, Absolute 6.98 10*3/mm3      Lymphocytes, Absolute 1.88 10*3/mm3      Monocytes, Absolute 1.28 10*3/mm3      Eosinophils, Absolute 0.05 10*3/mm3      Basophils, Absolute 0.03 10*3/mm3      Immature Grans, Absolute 0.04 10*3/mm3      nRBC 0.0 /100 WBC     POC Glucose Once [[id number]]  (Normal) Collected: 03/12/25 2126    Specimen: Blood Updated: 03/12/25 2127     Glucose 78 mg/dL     T4, Free [[id number]]  (Normal) Collected: 03/12/25 0710    Specimen: Blood Updated: 03/12/25 1714     Free T4 1.18 ng/dL     TSH [[id number]]  (Normal) Collected: 03/12/25 0710    Specimen: Blood Updated: 03/12/25 1714     TSH 1.100 uIU/mL     Hemoglobin A1c [[id number]]  (Abnormal) Collected: 03/12/25 0710    Specimen: Blood Updated: 03/12/25 1644     Hemoglobin A1C 8.30 %     Narrative:      Hemoglobin A1C Ranges:    Increased Risk for Diabetes  5.7% to 6.4%  Diabetes                     >= 6.5%  Diabetic Goal                < 7.0%    POC Glucose Once [[id number]]  (Normal) Collected: 03/12/25 1611    Specimen: Blood Updated: 03/12/25 1613     Glucose 82 mg/dL     Comprehensive Metabolic Panel [[id number]]  (Abnormal) Collected: 03/12/25 0710    Specimen: Blood  Updated: 03/12/25 0744     Glucose 70 mg/dL      BUN 21 mg/dL      Creatinine 1.15 mg/dL      Sodium 142 mmol/L      Potassium 3.7 mmol/L      Chloride 107 mmol/L      CO2 26.7 mmol/L      Calcium 9.0 mg/dL      Total Protein 6.6 g/dL      Albumin 3.8 g/dL      ALT (SGPT) 7 U/L      AST (SGOT) 11 U/L      Alkaline Phosphatase 123 U/L      Total Bilirubin 0.2 mg/dL      Globulin 2.8 gm/dL      A/G Ratio 1.4 g/dL      BUN/Creatinine Ratio 18.3     Anion Gap 8.3 mmol/L      eGFR 67.6 mL/min/1.73     Narrative:      GFR Categories in Chronic Kidney Disease (CKD)      GFR Category          GFR (mL/min/1.73)    Interpretation  G1                     90 or greater         Normal or high (1)  G2                      60-89                Mild decrease (1)  G3a                   45-59                Mild to moderate decrease  G3b                   30-44                Moderate to severe decrease  G4                    15-29                Severe decrease  G5                    14 or less           Kidney failure          (1)In the absence of evidence of kidney disease, neither GFR category G1 or G2 fulfill the criteria for CKD.    eGFR calculation 2021 CKD-EPI creatinine equation, which does not include race as a factor    Lipase [[id number]]  (Normal) Collected: 03/12/25 0710    Specimen: Blood Updated: 03/12/25 0744     Lipase 17 U/L     CBC & Differential [[id number]]  (Abnormal) Collected: 03/12/25 0710    Specimen: Blood Updated: 03/12/25 0734    Narrative:      The following orders were created for panel order CBC & Differential.  Procedure                               Abnormality         Status                     ---------                               -----------         ------                     CBC Auto Differential[[id number]]        Abnormal            Final result                 Please view results for these tests on the individual orders.    CBC Auto Differential [[id number]]  (Abnormal) Collected: 03/12/25 0710     Specimen: Blood Updated: 03/12/25 0734     WBC 9.30 10*3/mm3      RBC 4.21 10*6/mm3      Hemoglobin 12.8 g/dL      Hematocrit 37.7 %      MCV 89.5 fL      MCH 30.4 pg      MCHC 34.0 g/dL      RDW 13.5 %      RDW-SD 44.3 fl      MPV 9.4 fL      Platelets 235 10*3/mm3      Neutrophil % 69.6 %      Lymphocyte % 16.3 %      Monocyte % 12.9 %      Eosinophil % 0.6 %      Basophil % 0.3 %      Immature Grans % 0.3 %      Neutrophils, Absolute 6.46 10*3/mm3      Lymphocytes, Absolute 1.52 10*3/mm3      Monocytes, Absolute 1.20 10*3/mm3      Eosinophils, Absolute 0.06 10*3/mm3      Basophils, Absolute 0.03 10*3/mm3      Immature Grans, Absolute 0.03 10*3/mm3      nRBC 0.0 /100 WBC     [facility] Draw [[id number]] Collected: 03/12/25 0710    Specimen: Blood Updated: 03/12/25 0730    Narrative:      The following orders were created for panel order [place] Draw.  Procedure                               Abnormality         Status                     ---------                               -----------         ------                     Green Top (Gel)[[id number]]                                  Final result               Lavender Top[[id number]]                                     Final result               Gold Top - SST[[id number]]                                   Final result               Light Blue Top[[id number]]                                   Final result                 Please view results for these tests on the individual orders.    Green Top (Gel) [[id number]] Collected: 03/12/25 0710    Specimen: Blood Updated: 03/12/25 0730     Extra Tube Hold for add-ons.     Comment: Auto resulted.       Lavender Top [[id number]] Collected: 03/12/25 0710    Specimen: Blood Updated: 03/12/25 0730     Extra Tube hold for add-on     Comment: Auto resulted       Gold Top - SST [[id number]] Collected: 03/12/25 0710    Specimen: Blood Updated: 03/12/25 0730     Extra Tube Hold for add-ons.     Comment: Auto resulted.       Light Blue Top  "[[id number]] Collected: 03/12/25 0710    Specimen: Blood Updated: 03/12/25 0730     Extra Tube Hold for add-ons.     Comment: Auto resulted             Data Review:  Results from last 7 days   Lab Units 03/13/25  0656 03/12/25  0710   SODIUM mmol/L 145 142   POTASSIUM mmol/L 4.1 3.7   CHLORIDE mmol/L 109* 107   CO2 mmol/L 26.8 26.7   BUN mg/dL 18 21   CREATININE mg/dL 1.13 1.15   GLUCOSE mg/dL 113* 70   CALCIUM mg/dL 8.7 9.0     Results from last 7 days   Lab Units 03/13/25  0656 03/12/25  0710   WBC 10*3/mm3 10.26 9.30   HEMOGLOBIN g/dL 13.0 12.8*   HEMATOCRIT % 38.7 37.7   PLATELETS 10*3/mm3 229 235     Results from last 7 days   Lab Units 03/12/25  0710   TSH uIU/mL 1.100   FREE T4 ng/dL 1.18     Results from last 7 days   Lab Units 03/12/25  0710   HEMOGLOBIN A1C % 8.30*     No results found for: \"TROPONINT\"      Results from last 7 days   Lab Units 03/12/25  0710   ALK PHOS U/L 123*   BILIRUBIN mg/dL 0.2   ALT (SGPT) U/L 7   AST (SGOT) U/L 11     Results from last 7 days   Lab Units 03/12/25  0710   TSH uIU/mL 1.100   FREE T4 ng/dL 1.18     Results from last 7 days   Lab Units 03/12/25  0710   HEMOGLOBIN A1C % 8.30*     Glucose   Date/Time Value Ref Range Status   03/13/2025 1154 137 (H) 70 - 130 mg/dL Final   03/13/2025 0753 99 70 - 130 mg/dL Final   03/12/2025 2126 78 70 - 130 mg/dL Final   03/12/2025 1611 82 70 - 130 mg/dL Final           Past Medical History:   Diagnosis Date    Age-related osteoporosis without current pathological fracture     Atherosclerotic heart disease of native coronary artery without angina pectoris     Essential (primary) hypertension     Irritant contact dermatitis due to fecal, urinary or dual incontinence     Long term (current) use of insulin     Mixed hyperlipidemia     Other cervical disc degeneration, unspecified cervical region     Other intervertebral disc degeneration, 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     Other obstructive and reflux uropathy     Other specified " anxiety disorders     Parkinson's disease without dyskinesia, without mention of fluctuations     Personal history of malignant neoplasm of prostate     Type 2 diabetes mellitus with hyperglycemia     Unspecified dementia, unspecified severity, without behavioral disturbance, psychotic disturbance, mood disturbance, and anxiety     Vitamin D deficiency, unspecified        Assessment:  Active Hospital Problems    Diagnosis  POA    **Intractable abdominal pain [R10.9]  Yes    Constipation [K59.00]  Unknown    Type 2 diabetes mellitus with hyperglycemia, with long-term current use of insulin [E11.65, Z79.4]  Not Applicable    Parkinson disease [G20.A1]  Yes    Dementia [F03.90]  Yes      Resolved Hospital Problems   No resolved problems to display.       Plan:  GI consult noted.  Continue with current medications and laxatives.  Follow-up on labs.  DC planning.  Probably needs another day or 2 to see if he is going to feel well enough to go home.    Gary Bear MD  3/13/2025  14:07 EDT

## 2025-03-13 NOTE — THERAPY EVALUATION
Patient Name: Singh Hatch  : 1950    MRN: [id number]                              Today's Date: 3/13/2025       Admit Date: 3/12/2025    Visit Dx:     ICD-10-CM ICD-9-CM   1. Intractable abdominal pain  R10.9 789.00   2. Constipation, unspecified constipation type  K59.00 564.00   3. Abnormal CT of the abdomen  R93.5 793.6   4. Closed fracture of eleventh thoracic vertebra, unspecified fracture morphology, initial encounter  S22.806M 805.2     Patient Active Problem List   Diagnosis    Chronic indwelling Holland catheter    Dementia    Parkinson disease    Type 2 diabetes mellitus with hyperglycemia, with long-term current use of insulin    Acute metabolic encephalopathy    Intractable abdominal pain    Constipation     Past Medical History:   Diagnosis Date    Age-related osteoporosis without current pathological fracture     Atherosclerotic heart disease of native coronary artery without angina pectoris     Essential (primary) hypertension     Irritant contact dermatitis due to fecal, urinary or dual incontinence     Long term (current) use of insulin     Mixed hyperlipidemia     Other cervical disc degeneration, unspecified cervical region     Other intervertebral disc degeneration, 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     Other obstructive and reflux uropathy     Other specified anxiety disorders     Parkinson's disease without dyskinesia, without mention of fluctuations     Personal history of malignant neoplasm of prostate     Type 2 diabetes mellitus with hyperglycemia     Unspecified dementia, unspecified severity, without behavioral disturbance, psychotic disturbance, mood disturbance, and anxiety     Vitamin D deficiency, unspecified      History reviewed. No pertinent surgical history.   General Information       Row Name 25 1313          Physical Therapy Time and Intention    Document Type evaluation  -MG     Mode of Treatment co-treatment;physical therapy;occupational  therapy;other (see comments)  -MG       Row Name 03/13/25 1313          General Information    Patient Profile Reviewed yes  -MG     Prior Level of Function --  Patient reports he requires assist with ADLs, showering mobility with use of WC and RW. States mainly is in WC, which he does stand pivots w/ RW to. Ambulates short distances in room w/ Ax1 and RW.  -MG     Existing Precautions/Restrictions fall  -MG     Barriers to Rehab none identified  -MG       Row Name 03/13/25 1313          Living Environment    Current Living Arrangements assisted living facility  -MG       Row Name 03/13/25 1313          Cognition    Orientation Status (Cognition) oriented x 3;disoriented to;time  States it is Feb 2024 and with cues able to state correct Month/year.  -MG       Row Name 03/13/25 1313          Safety Issues/Impairments Affecting Functional Mobility    Safety Issues Affecting Function (Mobility) sequencing abilities  -MG     Impairments Affecting Function (Mobility) balance;endurance/activity tolerance;strength;motor control;pain  -MG     Comment, Safety Issues/Impairments (Mobility) Co treatment medically appropriate and necessary due to patient acuity level, activity tolerance and safety of patient and staff. Evaluation established to achieve all goals in POC. Gait belt and non-skid socks donned.  -MG               User Key  (r) = Recorded By, (t) = Taken By, (c) = Cosigned By      Initials Name Provider Type    MG Shonda Pollack, PT Physical Therapist                   Mobility       Row Name 03/13/25 1317          Bed Mobility    Bed Mobility supine-sit  -MG     Supine-Sit Stark (Bed Mobility) contact guard;standby assist  -MG     Assistive Device (Bed Mobility) bed rails  -MG     Comment, (Bed Mobility) Assist at trunk. Increased time to complete.  -MG       Row Name 03/13/25 1317          Sit-Stand Transfer    Sit-Stand Stark (Transfers) contact guard;standby assist;1 person assist;2 person  assist;verbal cues  -MG     Assistive Device (Sit-Stand Transfers) walker, front-wheeled  -MG     Comment, (Sit-Stand Transfer) x3 during session. Progression from CGA x2 to SBA. Pushing up with BUEs. No dizziness on standing.  -MG       Row Name 03/13/25 1317          Gait/Stairs (Locomotion)    [place] Level (Gait) contact guard;minimum assist (75% patient effort);verbal cues  -MG     Assistive Device (Gait) walker, front-wheeled  -MG     Distance in Feet (Gait) 4  + 12' x2  -MG     Deviations/Abnormal Patterns (Gait) stride length decreased;base of support, narrow  -MG     Bilateral Gait Deviations forward flexed posture;heel strike decreased  -MG     Comment, (Gait/Stairs) Cues for posture and to stay within RW. Dyskinesia resulting in unsteadiness. No bradykinesia or festinating noted. Limited by fatigue.  -MG               User Key  (r) = Recorded By, (t) = Taken By, (c) = Cosigned By      Initials Name Provider Type    MG Shonda Pollack, PT Physical Therapist                   Obj/Interventions       Row Name 03/13/25 1320          Range of Motion Comprehensive    General Range of Motion lower extremity range of motion deficits identified  -MG     Comment, General Range of Motion B knees chronically flexed around 10deg lacking from full extension.  -MG       Row Name 03/13/25 1320          Strength Comprehensive (MMT)    General Manual Muscle Testing (MMT) Assessment other (see comments)  -MG     Comment, General Manual Muscle Testing (MMT) Assessment Generalized weakness.  -MG       Row Name 03/13/25 1320          Balance    Comment, Balance Sitting EOB w/ SBA. Static standing at the sink w/ this PT for balance of CG/SBA, while performing ADLs w/ OT. Dynamic standing with CG/Francisco w/ mild unsteadiness due to his dyskinesia. No overt LOB or buckling.  -MG       Row Name 03/13/25 1320          Sensory Assessment (Somatosensory)    Sensory Assessment (Somatosensory) sensation intact  -MG               User  Key  (r) = Recorded By, (t) = Taken By, (c) = Cosigned By      Initials Name Provider Type    MG Shonda Pollack, PT Physical Therapist                   Goals/Plan       Row Name 03/13/25 1322          Bed Mobility Goal 1 (PT)    Activity/Assistive Device (Bed Mobility Goal 1, PT) bed mobility activities, all  -MG     [place] Level/Cues Needed (Bed Mobility Goal 1, PT) standby assist  -MG     Time Frame (Bed Mobility Goal 1, PT) 1 week  -MG       Row Name 03/13/25 1322          Transfer Goal 1 (PT)    Activity/Assistive Device (Transfer Goal 1, PT) transfers, all  -MG     [place] Level/Cues Needed (Transfer Goal 1, PT) standby assist  -MG     Time Frame (Transfer Goal 1, PT) 1 week  -MG       Row Name 03/13/25 1322          Gait Training Goal 1 (PT)    Activity/Assistive Device (Gait Training Goal 1, PT) gait (walking locomotion)  -MG     [place] Level (Gait Training Goal 1, PT) contact guard required  -MG     Distance (Gait Training Goal 1, PT) 30  -MG     Time Frame (Gait Training Goal 1, PT) 1 week  -MG       Row Name 03/13/25 1322          Therapy Assessment/Plan (PT)    Planned Therapy Interventions (PT) balance training;bed mobility training;gait training;home exercise program;patient/family education;stretching;strengthening;neuromuscular re-education;ROM (range of motion);postural re-education;transfer training  -MG               User Key  (r) = Recorded By, (t) = Taken By, (c) = Cosigned By      Initials Name Provider Type    MG Shonda Pollack, PT Physical Therapist                   Clinical Impression       Row Name 03/13/25 1321          Pain    Pretreatment Pain Rating 8/10  -MG     Posttreatment Pain Rating 8/10  -MG     Pain Location abdomen  -MG     Pain Management Interventions nursing notified;exercise or physical activity utilized;positioning techniques utilized  -MG     Response to Pain Interventions activity participation with tolerable pain  -MG       Row Name 03/13/25 1321           Plan of Care Review    Plan of Care Reviewed With patient  -MG     Progress improving  -MG     Outcome Evaluation Pt is a 74 y.o. male with h/o Parkinson's Disease, DM2 and dementia who was admitted to [facility] on 3/12/2025 with c/o acute abdominal pain and constipation. Patient requires Ax1 at baseline for ADLs/mobility, with use of a WC via stand pivot or RW for short distances and lives in an [place]. Today, pt performed bed mobility with CG/SBA, required CG/SBA for transfers with a RW, and ambulated 4' to the chair, then 12' x2 chair<>bathroom with a RW and CG/Francisco x1-2. Pt demos chronic B knee flexion, lacking approx 10 deg from full extension, and dyskinesia. Pt was mildly unsteady throughout, but no overt LOB or buckling. Pt may benefit from skilled PT services acutely to address their impaired strength, balance and endurance, as well as, improve their level of independence prior to discharge. Discussed w/ RN. Rec return to [facility] upon DC.  -MG       Row Name 03/13/25 1321          Therapy Assessment/Plan (PT)    Rehab Potential (PT) good  -MG     Criteria for Skilled Interventions Met (PT) yes;meets criteria  -MG     Therapy Frequency (PT) 3 times/wk  -MG       Row Name 03/13/25 1321          Positioning and Restraints    Pre-Treatment Position in bed  -MG     Post Treatment Position chair  -MG     In Chair notified nsg;reclined;call light within reach;encouraged to call for assist;exit alarm on;legs elevated  -MG               User Key  (r) = Recorded By, (t) = Taken By, (c) = Cosigned By      Initials Name Provider Type    MG Shonda Pollack, PT Physical Therapist                   Outcome Measures       Row Name 03/13/25 1322 03/13/25 0840       How much help from another person do you currently need...    Turning from your back to your side while in flat bed without using bedrails? 3  -MG 3  -EH    Moving from lying on back to sitting on the side of a flat bed without bedrails? 3  -MG 3  -EH    Moving to and from  a bed to a chair (including a wheelchair)? 3  -MG 2  -EH    Standing up from a chair using your arms (e.g., wheelchair, bedside chair)? 3  -MG 2  -EH    Climbing 3-5 steps with a railing? 2  -MG 2  -EH    To walk in hospital room? 3  -MG 2  -EH    AM-PAC 6 Clicks Score (PT) 17  -MG 14  -EH              User Key  (r) = Recorded By, (t) = Taken By, (c) = Cosigned By      Initials Name Provider Type    MG Shonda Pollack, JORDAN Physical Therapist     Cece Victor, RN Registered Nurse                                 Physical Therapy Education       Title: PT OT SLP Therapies (In Progress)       Topic: Physical Therapy (In Progress)       Point: Mobility training (Not Started)       Learner Progress:  Not documented in this visit.              Point: Home exercise program (Not Started)       Learner Progress:  Not documented in this visit.              Point: Body mechanics (Done)       Learning Progress Summary            Patient Acceptance, E,D, VU by  at 3/13/2025 1322                      Point: Precautions (Done)       Learning Progress Summary            Patient Acceptance, E,D, VU by  at 3/13/2025 1322                                      User Key       Initials Effective Dates Name Provider Type Discipline     05/24/22 -  Shonda Pollack, PT Physical Therapist PT                  PT Recommendation and Plan  Planned Therapy Interventions (PT): balance training, bed mobility training, gait training, home exercise program, patient/family education, stretching, strengthening, neuromuscular re-education, ROM (range of motion), postural re-education, transfer training  Progress: improving  Outcome Evaluation: Pt is a 74 y.o. male with h/o Parkinson's Disease, DM2 and dementia who was admitted to [facility] on 3/12/2025 with c/o acute abdominal pain and constipation. Patient requires Ax1 at baseline for ADLs/mobility, with use of a WC via stand pivot or RW for short distances and lives in an [place]. Today, pt performed bed  mobility with CG/SBA, required CG/SBA for transfers with a RW, and ambulated 4' to the chair, then 12' x2 chair<>bathroom with a RW and CG/Francisco x1-2. Pt demos chronic B knee flexion, lacking approx 10 deg from full extension, and dyskinesia. Pt was mildly unsteady throughout, but no overt LOB or buckling. Pt may benefit from skilled PT services acutely to address their impaired strength, balance and endurance, as well as, improve their level of independence prior to discharge. Discussed w/ RN. Rec return to group home upon DC.     Time Calculation:         PT Charges       Row Name 03/13/25 1322             Time Calculation    Start Time 1010  -MG      Stop Time 1032  -MG      Time Calculation (min) 22 min  -MG      PT Received On 03/13/25  -MG      PT - Next Appointment 03/14/25  -MG      PT Goal Re-Cert Due Date 03/27/25  -MG         Time Calculation- PT    Total Timed Code Minutes- PT 10 minute(s)  -MG                User Key  (r) = Recorded By, (t) = Taken By, (c) = Cosigned By      Initials Name Provider Type    MG Shonda Pollack, PT Physical Therapist                  Therapy Charges for Today       Code Description Service Date Service Provider Modifiers Qty    56148321212 HC PT EVAL MOD COMPLEXITY 3 3/13/2025 Shonda Pollack, PT GP 1    36501956561 HC PT THERAPEUTIC ACT EA 15 MIN 3/13/2025 Shonda Pollack, PT GP 1            PT G-Codes  AM-PAC 6 Clicks Score (PT): 17  PT Discharge Summary  Anticipated Discharge Disposition (PT): assisted living (Return to group home)    Shonda Pollack PT  3/13/2025

## 2025-03-13 NOTE — NURSING NOTE
Cwon consult received.  Patient with blanchable redness over sacrum.  Intact and dry. Will recommend turn protocol, zinc ointment and a bubble cushion to chair for appropriate pressure redistribution. I have added orders in Epic and updated primary nurse.

## 2025-03-14 LAB
ANION GAP SERPL CALCULATED.3IONS-SCNC: 11 MMOL/L (ref 5–15)
BACTERIA UR QL AUTO: ABNORMAL /HPF
BASOPHILS # BLD AUTO: 0.03 10*3/MM3 (ref 0–0.2)
BASOPHILS NFR BLD AUTO: 0.3 % (ref 0–1.5)
BILIRUB UR QL STRIP: NEGATIVE
BUN SERPL-MCNC: 20 MG/DL (ref 8–23)
BUN/CREAT SERPL: 18.5 (ref 7–25)
C AURIS DNA SPEC QL NAA+NON-PROBE: NOT DETECTED
CALCIUM SPEC-SCNC: 8.7 MG/DL (ref 8.6–10.5)
CHLORIDE SERPL-SCNC: 106 MMOL/L (ref 98–107)
CLARITY UR: CLEAR
CO2 SERPL-SCNC: 24 MMOL/L (ref 22–29)
COLOR UR: YELLOW
CREAT SERPL-MCNC: 1.08 MG/DL (ref 0.76–1.27)
DEPRECATED RDW RBC AUTO: 43.9 FL (ref 37–54)
EGFRCR SERPLBLD CKD-EPI 2021: 72.9 ML/MIN/1.73
EOSINOPHIL # BLD AUTO: 0.03 10*3/MM3 (ref 0–0.4)
EOSINOPHIL NFR BLD AUTO: 0.3 % (ref 0.3–6.2)
ERYTHROCYTE [DISTWIDTH] IN BLOOD BY AUTOMATED COUNT: 13.3 % (ref 12.3–15.4)
GLUCOSE BLDC GLUCOMTR-MCNC: 100 MG/DL (ref 70–130)
GLUCOSE BLDC GLUCOMTR-MCNC: 129 MG/DL (ref 70–130)
GLUCOSE BLDC GLUCOMTR-MCNC: 158 MG/DL (ref 70–130)
GLUCOSE BLDC GLUCOMTR-MCNC: 209 MG/DL (ref 70–130)
GLUCOSE SERPL-MCNC: 119 MG/DL (ref 65–99)
GLUCOSE UR STRIP-MCNC: ABNORMAL MG/DL
HCT VFR BLD AUTO: 38 % (ref 37.5–51)
HGB BLD-MCNC: 12.6 G/DL (ref 13–17.7)
HGB UR QL STRIP.AUTO: NEGATIVE
HYALINE CASTS UR QL AUTO: ABNORMAL /LPF
IMM GRANULOCYTES # BLD AUTO: 0.04 10*3/MM3 (ref 0–0.05)
IMM GRANULOCYTES NFR BLD AUTO: 0.4 % (ref 0–0.5)
KETONES UR QL STRIP: ABNORMAL
LEUKOCYTE ESTERASE UR QL STRIP.AUTO: ABNORMAL
LYMPHOCYTES # BLD AUTO: 1.78 10*3/MM3 (ref 0.7–3.1)
LYMPHOCYTES NFR BLD AUTO: 18 % (ref 19.6–45.3)
MCH RBC QN AUTO: 29.9 PG (ref 26.6–33)
MCHC RBC AUTO-ENTMCNC: 33.2 G/DL (ref 31.5–35.7)
MCV RBC AUTO: 90.3 FL (ref 79–97)
MONOCYTES # BLD AUTO: 1.16 10*3/MM3 (ref 0.1–0.9)
MONOCYTES NFR BLD AUTO: 11.7 % (ref 5–12)
MRSA DNA SPEC QL NAA+PROBE: ABNORMAL
NEUTROPHILS NFR BLD AUTO: 6.86 10*3/MM3 (ref 1.7–7)
NEUTROPHILS NFR BLD AUTO: 69.3 % (ref 42.7–76)
NITRITE UR QL STRIP: POSITIVE
NRBC BLD AUTO-RTO: 0 /100 WBC (ref 0–0.2)
PH UR STRIP.AUTO: 5.5 [PH] (ref 5–8)
PLATELET # BLD AUTO: 225 10*3/MM3 (ref 140–450)
PMV BLD AUTO: 9.3 FL (ref 6–12)
POTASSIUM SERPL-SCNC: 4.1 MMOL/L (ref 3.5–5.2)
PROT UR QL STRIP: ABNORMAL
RBC # BLD AUTO: 4.21 10*6/MM3 (ref 4.14–5.8)
RBC # UR STRIP: ABNORMAL /HPF
REF LAB TEST METHOD: ABNORMAL
SODIUM SERPL-SCNC: 141 MMOL/L (ref 136–145)
SP GR UR STRIP: 1.03 (ref 1–1.03)
SQUAMOUS #/AREA URNS HPF: ABNORMAL /HPF
UROBILINOGEN UR QL STRIP: ABNORMAL
WBC # UR STRIP: ABNORMAL /HPF
WBC NRBC COR # BLD AUTO: 9.9 10*3/MM3 (ref 3.4–10.8)

## 2025-03-14 PROCEDURE — 82948 REAGENT STRIP/BLOOD GLUCOSE: CPT

## 2025-03-14 PROCEDURE — 87040 BLOOD CULTURE FOR BACTERIA: CPT | Performed by: STUDENT IN AN ORGANIZED HEALTH CARE EDUCATION/TRAINING PROGRAM

## 2025-03-14 PROCEDURE — 87641 MR-STAPH DNA AMP PROBE: CPT | Performed by: STUDENT IN AN ORGANIZED HEALTH CARE EDUCATION/TRAINING PROGRAM

## 2025-03-14 PROCEDURE — 99221 1ST HOSP IP/OBS SF/LOW 40: CPT | Performed by: NURSE PRACTITIONER

## 2025-03-14 PROCEDURE — 25010000002 PIPERACILLIN SOD-TAZOBACTAM PER 1 G: Performed by: STUDENT IN AN ORGANIZED HEALTH CARE EDUCATION/TRAINING PROGRAM

## 2025-03-14 PROCEDURE — 63710000001 INSULIN LISPRO (HUMAN) PER 5 UNITS: Performed by: HOSPITALIST

## 2025-03-14 PROCEDURE — 99232 SBSQ HOSP IP/OBS MODERATE 35: CPT | Performed by: PHYSICIAN ASSISTANT

## 2025-03-14 PROCEDURE — 85025 COMPLETE CBC W/AUTO DIFF WBC: CPT | Performed by: HOSPITALIST

## 2025-03-14 PROCEDURE — 87086 URINE CULTURE/COLONY COUNT: CPT | Performed by: STUDENT IN AN ORGANIZED HEALTH CARE EDUCATION/TRAINING PROGRAM

## 2025-03-14 PROCEDURE — 80048 BASIC METABOLIC PNL TOTAL CA: CPT | Performed by: HOSPITALIST

## 2025-03-14 PROCEDURE — 87088 URINE BACTERIA CULTURE: CPT | Performed by: STUDENT IN AN ORGANIZED HEALTH CARE EDUCATION/TRAINING PROGRAM

## 2025-03-14 PROCEDURE — 25010000002 MORPHINE PER 10 MG: Performed by: STUDENT IN AN ORGANIZED HEALTH CARE EDUCATION/TRAINING PROGRAM

## 2025-03-14 PROCEDURE — 81001 URINALYSIS AUTO W/SCOPE: CPT | Performed by: NURSE PRACTITIONER

## 2025-03-14 PROCEDURE — 87186 SC STD MICRODIL/AGAR DIL: CPT | Performed by: STUDENT IN AN ORGANIZED HEALTH CARE EDUCATION/TRAINING PROGRAM

## 2025-03-14 RX ORDER — MORPHINE SULFATE 2 MG/ML
2 INJECTION, SOLUTION INTRAMUSCULAR; INTRAVENOUS EVERY 4 HOURS PRN
Status: DISCONTINUED | OUTPATIENT
Start: 2025-03-14 | End: 2025-03-15 | Stop reason: HOSPADM

## 2025-03-14 RX ORDER — CYCLOBENZAPRINE HCL 10 MG
10 TABLET ORAL 3 TIMES DAILY PRN
Status: DISCONTINUED | OUTPATIENT
Start: 2025-03-14 | End: 2025-03-15 | Stop reason: HOSPADM

## 2025-03-14 RX ADMIN — HYDROCODONE BITARTRATE AND ACETAMINOPHEN 1 TABLET: 5; 325 TABLET ORAL at 14:32

## 2025-03-14 RX ADMIN — MIRTAZAPINE 45 MG: 15 TABLET, FILM COATED ORAL at 21:08

## 2025-03-14 RX ADMIN — Medication 10 ML: at 21:32

## 2025-03-14 RX ADMIN — CARBIDOPA AND LEVODOPA 1.5 TABLET: 25; 250 TABLET ORAL at 08:11

## 2025-03-14 RX ADMIN — CARBIDOPA AND LEVODOPA 1.5 TABLET: 25; 250 TABLET ORAL at 11:53

## 2025-03-14 RX ADMIN — POLYETHYLENE GLYCOL 3350 17 G: 17 POWDER, FOR SOLUTION ORAL at 21:08

## 2025-03-14 RX ADMIN — PIPERACILLIN AND TAZOBACTAM 3.38 G: 3; .375 INJECTION, POWDER, FOR SOLUTION INTRAVENOUS at 21:08

## 2025-03-14 RX ADMIN — POLYETHYLENE GLYCOL 3350 17 G: 17 POWDER, FOR SOLUTION ORAL at 08:10

## 2025-03-14 RX ADMIN — CARBIDOPA AND LEVODOPA 1 TABLET: 50; 200 TABLET, EXTENDED RELEASE ORAL at 21:08

## 2025-03-14 RX ADMIN — SENNOSIDES AND DOCUSATE SODIUM 2 TABLET: 50; 8.6 TABLET ORAL at 08:10

## 2025-03-14 RX ADMIN — MORPHINE SULFATE 2 MG: 2 INJECTION, SOLUTION INTRAMUSCULAR; INTRAVENOUS at 17:00

## 2025-03-14 RX ADMIN — CYCLOBENZAPRINE HYDROCHLORIDE 10 MG: 10 TABLET, FILM COATED ORAL at 17:44

## 2025-03-14 RX ADMIN — MORPHINE SULFATE 2 MG: 2 INJECTION, SOLUTION INTRAMUSCULAR; INTRAVENOUS at 11:53

## 2025-03-14 RX ADMIN — HYDROCODONE BITARTRATE AND ACETAMINOPHEN 1 TABLET: 5; 325 TABLET ORAL at 02:17

## 2025-03-14 RX ADMIN — HYDROCODONE BITARTRATE AND ACETAMINOPHEN 1 TABLET: 5; 325 TABLET ORAL at 21:08

## 2025-03-14 RX ADMIN — INSULIN LISPRO 3 UNITS: 100 INJECTION, SOLUTION INTRAVENOUS; SUBCUTANEOUS at 12:21

## 2025-03-14 RX ADMIN — INSULIN LISPRO 2 UNITS: 100 INJECTION, SOLUTION INTRAVENOUS; SUBCUTANEOUS at 17:02

## 2025-03-14 RX ADMIN — CARBIDOPA AND LEVODOPA 1.5 TABLET: 25; 250 TABLET ORAL at 21:08

## 2025-03-14 RX ADMIN — SENNOSIDES AND DOCUSATE SODIUM 2 TABLET: 50; 8.6 TABLET ORAL at 21:08

## 2025-03-14 RX ADMIN — CARBIDOPA AND LEVODOPA 1.5 TABLET: 25; 250 TABLET ORAL at 17:02

## 2025-03-14 RX ADMIN — HYDROCODONE BITARTRATE AND ACETAMINOPHEN 1 TABLET: 5; 325 TABLET ORAL at 08:13

## 2025-03-14 RX ADMIN — SERTRALINE HYDROCHLORIDE 75 MG: 50 TABLET, FILM COATED ORAL at 08:10

## 2025-03-14 RX ADMIN — Medication 10 ML: at 08:11

## 2025-03-14 RX ADMIN — PIPERACILLIN AND TAZOBACTAM 3.38 G: 3; .375 INJECTION, POWDER, FOR SOLUTION INTRAVENOUS at 16:11

## 2025-03-14 NOTE — PLAN OF CARE
Goal Outcome Evaluation:         Patient is alert and oriented but disoriented to time, vitals stable, room air, up with assist x 2, tremors d/t parkinsons, refused enema but took stool softener and Miralax, peripheral IV saline locked, complaints of pain, see MAR for all medications administered, safety precautions in use, plan of care ongoing.   .  .  .  .  .  .  .  .  .  .  .

## 2025-03-14 NOTE — CONSULTS
[facility] NEUROSURGERY CONSULT NOTE    Patient name: Singh Hatch  Referring Provider: Dr White  Reason for Consultation: T11 [facility]    Patient Care Team:  Salomon Mcknight MD as PCP - General (Internal Medicine)    Chief complaint: Abdominal pain, constipation, back pain    Subjective .     History of present illness:    Patient is a 74 y.o.  male past medical history of Parkinson's disease, dementia, DM 2.  Patient presented to the emergency room 3/12/2025 with complaint of abdominal pain and constipation.  He also reports he is having back pain since he slid out of his bed last week at his rehab facility.  Pt somewhat of a poor historian d/t history of dementia, no family at bedside.      History  PAST MEDICAL HISTORY  Past Medical History:   Diagnosis Date    Age-related osteoporosis without current pathological fracture     Atherosclerotic heart disease of native coronary artery without angina pectoris     Essential (primary) hypertension     Irritant contact dermatitis due to fecal, urinary or dual incontinence     Long term (current) use of insulin     Mixed hyperlipidemia     Other cervical disc degeneration, unspecified cervical region     Other intervertebral disc degeneration, 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     Other obstructive and reflux uropathy     Other specified anxiety disorders     Parkinson's disease without dyskinesia, without mention of fluctuations     Personal history of malignant neoplasm of prostate     Type 2 diabetes mellitus with hyperglycemia     Unspecified dementia, unspecified severity, without behavioral disturbance, psychotic disturbance, mood disturbance, and anxiety     Vitamin D deficiency, unspecified        PAST SURGICAL HISTORY  History reviewed. No pertinent surgical history.    FAMILY HISTORY  History reviewed. No pertinent family history.    SOCIAL HISTORY  Social History     Tobacco Use    Smoking status: Never     Passive exposure: Never    Smokeless  tobacco: Never   Vaping Use    Vaping status: Never Used   Substance Use Topics    Alcohol use: Never    Drug use: Not Currently       Allergies:  Patient has no known allergies.    MEDICATIONS:  Medications Prior to Admission   Medication Sig Dispense Refill Last Dose/Taking    aspirin 81 MG EC tablet Take 1 tablet by mouth Daily.   3/11/2025    bisacodyl (Dulcolax) 10 MG suppository Insert 1 suppository into the rectum Daily As Needed for Constipation. 28 suppository 0 Taking As Needed    carbidopa-levodopa (SINEMET)  MG per tablet Take 1.5 tablets by mouth 4 (Four) Times a Day.   3/11/2025    carbidopa-levodopa CR (SINEMET CR)  MG per CR tablet Take 1 tablet by mouth Every Night. Give at 2300   3/11/2025    [] cephalexin (KEFLEX) 500 MG capsule Take 1 capsule by mouth 2 (Two) Times a Day for 7 days. (Patient taking differently: Take 1 capsule by mouth 2 (Two) Times a Day. Ends 3/13/25) 14 capsule 0 3/11/2025    dicyclomine (BENTYL) 20 MG tablet Take 1 tablet by mouth Every 6 (Six) Hours. 20 tablet 0 3/12/2025    gabapentin (NEURONTIN) 300 MG capsule Take 1 capsule by mouth 2 (Two) Times a Day.   3/11/2025    insulin glargine (LANTUS, SEMGLEE) 100 UNIT/ML injection Inject 40 Units under the skin into the appropriate area as directed Every Night.   3/11/2025    Insulin Lispro (humaLOG) 100 UNIT/ML injection Inject 2-10 Units under the skin into the appropriate area as directed 3 (Three) Times a Day Before Meals. -199=2 unit,200-249=4 unit,250-299=6 unit,300-349=8 unit,350-400=10 unit,>400= call MD   3/11/2025    miconazole (MICOTIN) 2 % cream Apply 1 Application topically to the appropriate area as directed 2 (Two) Times a Day. Clean scrotum with soap and water and apply miconazole to area r/t yeast   3/11/2025    mirtazapine (REMERON) 30 MG tablet Take 1.5 tablets by mouth Every Night.   3/11/2025    polyethylene glycol (MIRALAX) 17 GM/SCOOP powder Mix one capful (17 g) in liquid and take  by mouth Daily. 510 g 0 3/11/2025    sennosides-docusate (senna-docusate sodium) 8.6-50 MG per tablet Take 1 tablet by mouth Daily for 30 days. 30 tablet 0 3/11/2025    sertraline (ZOLOFT) 25 MG tablet Take 3 tablets by mouth Daily.   3/11/2025    vitamin B-12 (CYANOCOBALAMIN) 1000 MCG tablet Take 1 tablet by mouth Daily.   3/11/2025    vitamin D (ERGOCALCIFEROL) 1.25 MG (99946 UT) capsule capsule Take 1 capsule by mouth 1 (One) Time Per Week. Wednesdays   Past Week    dextrose (GLUTOSE) 40 % gel Take 15 g by mouth Every 1 (One) Hour As Needed for Low Blood Sugar.       glucagon, human recombinant, (GLUCAGEN DIAGNOSTIC) 1 MG injection Infuse 1 mg into a venous catheter 1 (One) Time.          Current Facility-Administered Medications:     acetaminophen (TYLENOL) tablet 650 mg, 650 mg, Oral, Q4H PRN **OR** acetaminophen (TYLENOL) 160 MG/5ML oral solution 650 mg, 650 mg, Oral, Q4H PRN **OR** acetaminophen (TYLENOL) suppository 650 mg, 650 mg, Rectal, Q4H PRN, Gary Bear MD    sennosides-docusate (PERICOLACE) 8.6-50 MG per tablet 2 tablet, 2 tablet, Oral, BID PRN **AND** polyethylene glycol (MIRALAX) packet 17 g, 17 g, Oral, Daily PRN **AND** bisacodyl (DULCOLAX) EC tablet 5 mg, 5 mg, Oral, Daily PRN **AND** bisacodyl (DULCOLAX) suppository 10 mg, 10 mg, Rectal, Daily PRN, Gary Bear MD    carbidopa-levodopa (SINEMET)  MG per tablet 1.5 tablet, 1.5 tablet, Oral, 4x Daily, Gary Bear MD, 1.5 tablet at 03/14/25 0811    carbidopa-levodopa CR (SINEMET CR)  MG per CR tablet 1 tablet, 1 tablet, Oral, Nightly, Gary Bear MD, 1 tablet at 03/13/25 2138    dextrose (D50W) (25 g/50 mL) IV injection 25 g, 25 g, Intravenous, Q15 Min PRN, Gary Bear MD    dextrose (GLUTOSE) oral gel 15 g, 15 g, Oral, Q15 Min PRN, Gary Bear MD    glucagon (GLUCAGEN) injection 1 mg, 1 mg, Intramuscular, Q15 Min PRN, Gary Bear MD    HYDROcodone-acetaminophen (NORCO) 5-325 MG per tablet 1 tablet, 1 tablet, Oral,  Q6H PRN, Sudeep Whitlock APRN, 1 tablet at 03/14/25 0813    insulin lispro (HUMALOG/ADMELOG) injection 2-7 Units, 2-7 Units, Subcutaneous, 4x Daily AC & at Bedtime, Gary Bear MD, 2 Units at 03/13/25 2138    mirtazapine (REMERON) tablet 45 mg, 45 mg, Oral, Nightly, Gary Bear MD, 45 mg at 03/13/25 2137    morphine injection 2 mg, 2 mg, Intravenous, Q4H PRN, Rosibel White DO    ondansetron ODT (ZOFRAN-ODT) disintegrating tablet 4 mg, 4 mg, Oral, Q6H PRN **OR** ondansetron (ZOFRAN) injection 4 mg, 4 mg, Intravenous, Q6H PRN, Gary Bear MD    polyethylene glycol (MIRALAX) packet 17 g, 17 g, Oral, BID, Marti Strange PA-C, 17 g at 03/14/25 0810    sennosides-docusate (PERICOLACE) 8.6-50 MG per tablet 2 tablet, 2 tablet, Oral, BID, Marti Strange PA-C, 2 tablet at 03/14/25 0810    sertraline (ZOLOFT) tablet 75 mg, 75 mg, Oral, Daily, Gary Bear MD, 75 mg at 03/14/25 0810    sodium chloride 0.9 % flush 10 mL, 10 mL, Intravenous, PRN, Yelena Lopez APRN    sodium chloride 0.9 % flush 10 mL, 10 mL, Intravenous, Q12H, Gary Bear MD, 10 mL at 03/14/25 0811    sodium chloride 0.9 % flush 10 mL, 10 mL, Intravenous, PRN, Gary Bear MD    sodium chloride 0.9 % infusion 40 mL, 40 mL, Intravenous, PRN, Gary Bear MD    COMORBID CONDITIONS:  Diabetes Type 2 and Dementia, Parkinson's       Review of Systems  Review of Systems   Unable to perform ROS: Dementia   Gastrointestinal:  Positive for abdominal pain and constipation.   Musculoskeletal:  Positive for back pain.   Psychiatric/Behavioral:  Negative for agitation.      Objective     Physical Exam  Physical Exam  Vitals reviewed.   Constitutional:       General: He is awake.      Comments: Frail and elderly appearing   Pulmonary:      Effort: Pulmonary effort is normal.   Musculoskeletal:      Cervical back: Normal.      Thoracic back: Bony tenderness present.      Lumbar back: Bony tenderness present. Negative right  straight leg raise test and negative left straight leg raise test.   Skin:     General: Skin is warm and dry.   Neurological:      Mental Status: He is alert.      Motor: Motor strength is normal.     Deep Tendon Reflexes:      Reflex Scores:       Tricep reflexes are 2+ on the right side and 2+ on the left side.       Bicep reflexes are 2+ on the right side and 2+ on the left side.       Brachioradialis reflexes are 2+ on the right side and 2+ on the left side.       Patellar reflexes are 1+ on the right side and 1+ on the left side.       Achilles reflexes are 1+ on the right side and 1+ on the left side.      Neurological Exam  Mental Status  Awake and alert. Oriented only to person and place.    Motor  Normal muscle bulk throughout. Normal muscle tone. Strength is 5/5 throughout all four extremities.    Sensory  Sensation is intact to light touch, pinprick, vibration and proprioception in all four extremities.    Reflexes                                            Right                      Left  Brachioradialis                    2+                         2+  Biceps                                 2+                         2+  Triceps                                2+                         2+  Patellar                                1+                         1+  Achilles                                1+                         1+    Gait    Gait not tested d/t fall risk.        Results Review:    LABS:    Admission on 03/12/2025   Component Date Value Ref Range Status    Glucose 03/12/2025 70  65 - 99 mg/dL Final    BUN 03/12/2025 21  8 - 23 mg/dL Final    Creatinine 03/12/2025 1.15  0.76 - 1.27 mg/dL Final    Sodium 03/12/2025 142  136 - 145 mmol/L Final    Potassium 03/12/2025 3.7  3.5 - 5.2 mmol/L Final    Chloride 03/12/2025 107  98 - 107 mmol/L Final    CO2 03/12/2025 26.7  22.0 - 29.0 mmol/L Final    Calcium 03/12/2025 9.0  8.6 - 10.5 mg/dL Final    Total Protein 03/12/2025 6.6  6.0 - 8.5 g/dL Final     Albumin 03/12/2025 3.8  3.5 - 5.2 g/dL Final    ALT (SGPT) 03/12/2025 7  1 - 41 U/L Final    AST (SGOT) 03/12/2025 11  1 - 40 U/L Final    Alkaline Phosphatase 03/12/2025 123 (H)  39 - 117 U/L Final    Total Bilirubin 03/12/2025 0.2  0.0 - 1.2 mg/dL Final    Globulin 03/12/2025 2.8  gm/dL Final    A/G Ratio 03/12/2025 1.4  g/dL Final    BUN/Creatinine Ratio 03/12/2025 18.3  7.0 - 25.0 Final    Anion Gap 03/12/2025 8.3  5.0 - 15.0 mmol/L Final    eGFR 03/12/2025 67.6  >60.0 mL/min/1.73 Final    Lipase 03/12/2025 17  13 - 60 U/L Final    Extra Tube 03/12/2025 Hold for add-ons.   Final    Auto resulted.    Extra Tube 03/12/2025 hold for add-on   Final    Auto resulted    Extra Tube 03/12/2025 Hold for add-ons.   Final    Auto resulted.    Extra Tube 03/12/2025 Hold for add-ons.   Final    Auto resulted    WBC 03/12/2025 9.30  3.40 - 10.80 10*3/mm3 Final    RBC 03/12/2025 4.21  4.14 - 5.80 10*6/mm3 Final    Hemoglobin 03/12/2025 12.8 (L)  13.0 - 17.7 g/dL Final    Hematocrit 03/12/2025 37.7  37.5 - 51.0 % Final    MCV 03/12/2025 89.5  79.0 - 97.0 fL Final    MCH 03/12/2025 30.4  26.6 - 33.0 pg Final    MCHC 03/12/2025 34.0  31.5 - 35.7 g/dL Final    RDW 03/12/2025 13.5  12.3 - 15.4 % Final    RDW-SD 03/12/2025 44.3  37.0 - 54.0 fl Final    MPV 03/12/2025 9.4  6.0 - 12.0 fL Final    Platelets 03/12/2025 235  140 - 450 10*3/mm3 Final    Neutrophil % 03/12/2025 69.6  42.7 - 76.0 % Final    Lymphocyte % 03/12/2025 16.3 (L)  19.6 - 45.3 % Final    Monocyte % 03/12/2025 12.9 (H)  5.0 - 12.0 % Final    Eosinophil % 03/12/2025 0.6  0.3 - 6.2 % Final    Basophil % 03/12/2025 0.3  0.0 - 1.5 % Final    Immature Grans % 03/12/2025 0.3  0.0 - 0.5 % Final    Neutrophils, Absolute 03/12/2025 6.46  1.70 - 7.00 10*3/mm3 Final    Lymphocytes, Absolute 03/12/2025 1.52  0.70 - 3.10 10*3/mm3 Final    Monocytes, Absolute 03/12/2025 1.20 (H)  0.10 - 0.90 10*3/mm3 Final    Eosinophils, Absolute 03/12/2025 0.06  0.00 - 0.40 10*3/mm3 Final     Basophils, Absolute 03/12/2025 0.03  0.00 - 0.20 10*3/mm3 Final    Immature Grans, Absolute 03/12/2025 0.03  0.00 - 0.05 10*3/mm3 Final    nRBC 03/12/2025 0.0  0.0 - 0.2 /100 WBC Final    Glucose 03/12/2025 82  70 - 130 mg/dL Final    Hemoglobin A1C 03/12/2025 8.30 (H)  4.80 - 5.60 % Final    Free T4 03/12/2025 1.18  0.92 - 1.68 ng/dL Final    TSH 03/12/2025 1.100  0.270 - 4.200 uIU/mL Final    Glucose 03/12/2025 78  70 - 130 mg/dL Final    Glucose 03/13/2025 113 (H)  65 - 99 mg/dL Final    BUN 03/13/2025 18  8 - 23 mg/dL Final    Creatinine 03/13/2025 1.13  0.76 - 1.27 mg/dL Final    Sodium 03/13/2025 145  136 - 145 mmol/L Final    Potassium 03/13/2025 4.1  3.5 - 5.2 mmol/L Final    Chloride 03/13/2025 109 (H)  98 - 107 mmol/L Final    CO2 03/13/2025 26.8  22.0 - 29.0 mmol/L Final    Calcium 03/13/2025 8.7  8.6 - 10.5 mg/dL Final    BUN/Creatinine Ratio 03/13/2025 15.9  7.0 - 25.0 Final    Anion Gap 03/13/2025 9.2  5.0 - 15.0 mmol/L Final    eGFR 03/13/2025 69.1  >60.0 mL/min/1.73 Final    WBC 03/13/2025 10.26  3.40 - 10.80 10*3/mm3 Final    RBC 03/13/2025 4.34  4.14 - 5.80 10*6/mm3 Final    Hemoglobin 03/13/2025 13.0  13.0 - 17.7 g/dL Final    Hematocrit 03/13/2025 38.7  37.5 - 51.0 % Final    MCV 03/13/2025 89.2  79.0 - 97.0 fL Final    MCH 03/13/2025 30.0  26.6 - 33.0 pg Final    MCHC 03/13/2025 33.6  31.5 - 35.7 g/dL Final    RDW 03/13/2025 13.3  12.3 - 15.4 % Final    RDW-SD 03/13/2025 43.5  37.0 - 54.0 fl Final    MPV 03/13/2025 9.4  6.0 - 12.0 fL Final    Platelets 03/13/2025 229  140 - 450 10*3/mm3 Final    Neutrophil % 03/13/2025 68.0  42.7 - 76.0 % Final    Lymphocyte % 03/13/2025 18.3 (L)  19.6 - 45.3 % Final    Monocyte % 03/13/2025 12.5 (H)  5.0 - 12.0 % Final    Eosinophil % 03/13/2025 0.5  0.3 - 6.2 % Final    Basophil % 03/13/2025 0.3  0.0 - 1.5 % Final    Immature Grans % 03/13/2025 0.4  0.0 - 0.5 % Final    Neutrophils, Absolute 03/13/2025 6.98  1.70 - 7.00 10*3/mm3 Final    Lymphocytes, Absolute  03/13/2025 1.88  0.70 - 3.10 10*3/mm3 Final    Monocytes, Absolute 03/13/2025 1.28 (H)  0.10 - 0.90 10*3/mm3 Final    Eosinophils, Absolute 03/13/2025 0.05  0.00 - 0.40 10*3/mm3 Final    Basophils, Absolute 03/13/2025 0.03  0.00 - 0.20 10*3/mm3 Final    Immature Grans, Absolute 03/13/2025 0.04  0.00 - 0.05 10*3/mm3 Final    nRBC 03/13/2025 0.0  0.0 - 0.2 /100 WBC Final    Glucose 03/13/2025 99  70 - 130 mg/dL Final    Glucose 03/13/2025 137 (H)  70 - 130 mg/dL Final    CA 19-9 03/12/2025 8.8  <=35.0 U/mL Final    Glucose 03/13/2025 241 (H)  70 - 130 mg/dL Final    Glucose 03/13/2025 160 (H)  70 - 130 mg/dL Final    Glucose 03/14/2025 119 (H)  65 - 99 mg/dL Final    BUN 03/14/2025 20  8 - 23 mg/dL Final    Creatinine 03/14/2025 1.08  0.76 - 1.27 mg/dL Final    Sodium 03/14/2025 141  136 - 145 mmol/L Final    Potassium 03/14/2025 4.1  3.5 - 5.2 mmol/L Final    Chloride 03/14/2025 106  98 - 107 mmol/L Final    CO2 03/14/2025 24.0  22.0 - 29.0 mmol/L Final    Calcium 03/14/2025 8.7  8.6 - 10.5 mg/dL Final    BUN/Creatinine Ratio 03/14/2025 18.5  7.0 - 25.0 Final    Anion Gap 03/14/2025 11.0  5.0 - 15.0 mmol/L Final    eGFR 03/14/2025 72.9  >60.0 mL/min/1.73 Final    WBC 03/14/2025 9.90  3.40 - 10.80 10*3/mm3 Final    RBC 03/14/2025 4.21  4.14 - 5.80 10*6/mm3 Final    Hemoglobin 03/14/2025 12.6 (L)  13.0 - 17.7 g/dL Final    Hematocrit 03/14/2025 38.0  37.5 - 51.0 % Final    MCV 03/14/2025 90.3  79.0 - 97.0 fL Final    MCH 03/14/2025 29.9  26.6 - 33.0 pg Final    MCHC 03/14/2025 33.2  31.5 - 35.7 g/dL Final    RDW 03/14/2025 13.3  12.3 - 15.4 % Final    RDW-SD 03/14/2025 43.9  37.0 - 54.0 fl Final    MPV 03/14/2025 9.3  6.0 - 12.0 fL Final    Platelets 03/14/2025 225  140 - 450 10*3/mm3 Final    Neutrophil % 03/14/2025 69.3  42.7 - 76.0 % Final    Lymphocyte % 03/14/2025 18.0 (L)  19.6 - 45.3 % Final    Monocyte % 03/14/2025 11.7  5.0 - 12.0 % Final    Eosinophil % 03/14/2025 0.3  0.3 - 6.2 % Final    Basophil %  03/14/2025 0.3  0.0 - 1.5 % Final    Immature Grans % 03/14/2025 0.4  0.0 - 0.5 % Final    Neutrophils, Absolute 03/14/2025 6.86  1.70 - 7.00 10*3/mm3 Final    Lymphocytes, Absolute 03/14/2025 1.78  0.70 - 3.10 10*3/mm3 Final    Monocytes, Absolute 03/14/2025 1.16 (H)  0.10 - 0.90 10*3/mm3 Final    Eosinophils, Absolute 03/14/2025 0.03  0.00 - 0.40 10*3/mm3 Final    Basophils, Absolute 03/14/2025 0.03  0.00 - 0.20 10*3/mm3 Final    Immature Grans, Absolute 03/14/2025 0.04  0.00 - 0.05 10*3/mm3 Final    nRBC 03/14/2025 0.0  0.0 - 0.2 /100 WBC Final    Glucose 03/14/2025 129  70 - 130 mg/dL Final       DIAGNOSTICS:  CT abdomen and pelvis 3/12:    IMPRESSION:     1. Colonic diverticulosis. Minimal stranding seen adjacent to a  diverticulum in the descending colon questionable for a very early  diverticulitis.   2. Large amount of gas and stool seen in the rectum, with distention,  unchanged.  3. 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  Nonemergent pancreas MRI could better characterize.  4. Stable inferior endplate fracture along the right margin of the T11  vertebral body that appears new from CT performed February 24, 2025    Results Review:   I reviewed the patient's new clinical results.  I personally viewed  the patient's chart/CT abd/pelvis, it was also discussed with Dr. Desai    Vital Signs   Temp:  [97.4 °F (36.3 °C)-98.4 °F (36.9 °C)] 97.6 °F (36.4 °C)  Heart Rate:  [] 101  Resp:  [18-20] 20  BP: (128-147)/() 147/102      Assessment & Plan       Intractable abdominal pain    Dementia    Parkinson disease    Type 2 diabetes mellitus with hyperglycemia, with long-term current use of insulin    Constipation      Problem List Items Addressed This Visit       * (Principal) Intractable abdominal pain - Primary    Constipation     Other Visit Diagnoses         Abnormal CT of the abdomen          Closed fracture of eleventh thoracic vertebra, unspecified fracture morphology, initial  encounter                 74-year-old male with a history of dementia, Parkinson's, previous L3-5 fusion in 2019 with Dr Bush, presents 2 days ago with complaint of abdominal pain and constipation.  He was found to have a T11 compression fracture on CT abdomen and pelvis that was not seen on previous imaging dated 3//25.  Patient is a poor historian but does report that he bed last week and has had back pain since that time.  Will get a TLSO brace, recommend PT.  Pt will need to follow up with his spine surgeon Dr Bush once discharged.  DANNY will sign off at this time. Please feel free to call with any questions or concerns.    PLAN:   -DANNY to sign off-pt will need to f/u with Dr Bush once DC'd from hospital  -TLSO brace when OOB  -PT    A TLSO brace has been ordered due to diagnosis of T11 VCF.  The purpose of the brace is to support weak muscles, stabilize and restrict movement of the trunk to aid in healing and pain relief of (fracture/ postop).          I discussed the patient's findings and my recommendations with patient and Dr Desai    During patient visit, I utilized appropriate personal protective equipment including gloves and mask.  Mask used was standard procedure mask. Appropriate PPE was worn during the entire visit.  Hand hygiene was completed before and after.     I spent 20 minutes caring for Singh Hatch on this date of service. This time includes time spent by me in the following activities: preparing for the visit, reviewing tests, obtaining and/or reviewing a separately obtained history, performing a medically appropriate examination and/or evaluation, counseling and educating the patient/family/caregiver, ordering medications, tests, or procedures, referring and communicating with other health care professionals, documenting information in the medical record, independently interpreting results and communicating that information with the patient/family/caregiver, and care  "coordination         Julieta Mcgraw, APRN  03/14/25  10:24 EDT    \"Dictated utilizing Dragon dictation\".      "

## 2025-03-14 NOTE — DISCHARGE PLACEMENT REQUEST
"Dimitri Webber (74 y.o. Male)       Date of Birth   1950    Social Security Number       Address   [address] [address]    Home Phone   [phone]    MRN   [id number]       Rastafarian   Yarsanism    Marital Status                               Admission Date   3/12/2025    Admission Type   Emergency    Admitting Provider   Gary Bear MD    Attending Provider   Rosibel White DO    Department, Room/Bed   [address], [address]384/1       Discharge Date       Discharge Disposition       Discharge Destination                                 Attending Provider: Rosibel White DO    Allergies: No Known Allergies    Isolation: Contact   Infection: Candida Auris (rule out) (03/12/25), MRSA (03/13/25), ESBL Klebsiella (03/13/25)   Code Status: CPR    Ht: 180.3 cm (71\")   Wt: 65.8 kg (145 lb)    Admission Cmt: None   Principal Problem: Intractable abdominal pain [R10.9]                   Active Insurance as of 3/12/2025       Primary Coverage       Payor Plan Insurance Group Employer/Plan Group    MEDICARE MEDICARE A & B        Payor Plan Address Payor Plan Phone Number Payor Plan Fax Number Effective Dates    [address] [phone]  10/1/2005 - None Entered    [facility] [id number]         Subscriber Name Subscriber Birth Date Member ID       DIMITRI WEBBER 1950 [id number]               Secondary Coverage       Payor Plan Insurance Group Employer/Plan Group    [place]        Payor Plan Address Payor Plan Phone Number Payor Plan Fax Number Effective Dates    [address] [phone]  3/12/2025 - None Entered    [place] [address]         Subscriber Name Subscriber Birth Date Member ID       DIMITRI WEBBER 1950 [id number]                     Emergency Contacts        (Rel.) Home Phone Work Phone Mobile Phone    Mamie,Lenny (Brother) [phone] -- [phone]    Yasmin Lombardi " (Sister) [phone] -- [phone]

## 2025-03-14 NOTE — PROGRESS NOTES
Name: Singh Hatch ADMIT: 3/12/2025   : 1950  PCP: Salomon Mcknight MD    MRN: [id number] LOS: 0 days   AGE/SEX: 74 y.o. male  ROOM: [facility]     Subjective   Subjective   3/14/2025  Nursing reports a bowel movement last night the patient refuses enema.  Patient reports to continued abdominal and back pain.  New T11 compression fracture seen on CT, will have neurosurgery evaluate.  Due to continued pain we will start IV pain meds.       Objective   Objective   Vital Signs  Temp:  [97.4 °F (36.3 °C)-98.2 °F (36.8 °C)] 97.6 °F (36.4 °C)  Heart Rate:  [] 91  Resp:  [18-20] 18  BP: (128-147)/() 133/82  SpO2:  [93 %-99 %] 93 %  on   ;   Device (Oxygen Therapy): room air  Body mass index is 20.22 kg/m².  Physical Exam  Constitutional:       Appearance: He is ill-appearing.   HENT:      Head: Normocephalic and atraumatic.      Nose: Nose normal. No congestion.      Mouth/Throat:      Pharynx: Oropharynx is clear. No oropharyngeal exudate.   Eyes:      General: No scleral icterus.  Cardiovascular:      Rate and Rhythm: Normal rate and regular rhythm.      Heart sounds: No murmur heard.     No friction rub. No gallop.   Pulmonary:      Effort: No respiratory distress.      Breath sounds: No wheezing or rales.   Abdominal:      General: There is no distension.      Tenderness: There is no abdominal tenderness. There is no guarding.   Musculoskeletal:         General: No swelling, deformity or signs of injury.      Cervical back: Normal range of motion. No rigidity.   Skin:     Coloration: Skin is not jaundiced.      Findings: No bruising or lesion.   Neurological:      General: No focal deficit present.      Mental Status: He is alert.      Motor: Weakness present.       Results Review     I reviewed the patient's new clinical results.  Results from last 7 days   Lab Units 25  0640 25  0656 25  0710   WBC 10*3/mm3 9.90 10.26 9.30   HEMOGLOBIN g/dL 12.6* 13.0 12.8*   PLATELETS  10*3/mm3 225 229 235     Results from last 7 days   Lab Units 03/14/25  0640 03/13/25  0656 03/12/25  0710   SODIUM mmol/L 141 145 142   POTASSIUM mmol/L 4.1 4.1 3.7   CHLORIDE mmol/L 106 109* 107   CO2 mmol/L 24.0 26.8 26.7   BUN mg/dL 20 18 21   CREATININE mg/dL 1.08 1.13 1.15   GLUCOSE mg/dL 119* 113* 70   EGFR mL/min/1.73 72.9 69.1 67.6     Results from last 7 days   Lab Units 03/12/25  0710   ALBUMIN g/dL 3.8   BILIRUBIN mg/dL 0.2   ALK PHOS U/L 123*   AST (SGOT) U/L 11   ALT (SGPT) U/L 7     Results from last 7 days   Lab Units 03/14/25  0640 03/13/25  0656 03/12/25  0710   CALCIUM mg/dL 8.7 8.7 9.0   ALBUMIN g/dL  --   --  3.8       Hemoglobin A1C   Date/Time Value Ref Range Status   03/12/2025 0710 8.30 (H) 4.80 - 5.60 % Final     Glucose   Date/Time Value Ref Range Status   03/14/2025 1157 209 (H) 70 - 130 mg/dL Final   03/14/2025 0813 129 70 - 130 mg/dL Final   03/13/2025 2121 160 (H) 70 - 130 mg/dL Final   03/13/2025 1641 241 (H) 70 - 130 mg/dL Final   03/13/2025 1154 137 (H) 70 - 130 mg/dL Final   03/13/2025 0753 99 70 - 130 mg/dL Final   03/12/2025 2126 78 70 - 130 mg/dL Final       No radiology results for the last day    I have personally reviewed all medications:  Scheduled Medications  carbidopa-levodopa, 1.5 tablet, Oral, 4x Daily  carbidopa-levodopa CR, 1 tablet, Oral, Nightly  insulin lispro, 2-7 Units, Subcutaneous, 4x Daily AC & at Bedtime  mirtazapine, 45 mg, Oral, Nightly  polyethylene glycol, 17 g, Oral, BID  senna-docusate sodium, 2 tablet, Oral, BID  sertraline, 75 mg, Oral, Daily  sodium chloride, 10 mL, Intravenous, Q12H    Infusions   Diet  Diet: Diabetic, Gastrointestinal; Consistent Carbohydrate; Fiber-Restricted; Fluid Consistency: Thin (IDDSI 0)    I have personally reviewed:  [x]  Laboratory   [x]  Microbiology   [x]  Radiology   [x]  EKG/Telemetry  [x]  Cardiology/Vascular   []  Pathology    []  Records       Assessment/Plan     Active Hospital Problems    Diagnosis  POA     **Intractable abdominal pain [R10.9]  Yes    Constipation [K59.00]  Unknown    Type 2 diabetes mellitus with hyperglycemia, with long-term current use of insulin [E11.65, Z79.4]  Not Applicable    Parkinson disease [G20.A1]  Yes    Dementia [F03.90]  Yes      Resolved Hospital Problems   No resolved problems to display.       74 y.o. male admitted with Intractable abdominal pain.    #UTI    -UA shows UTI    -Patient with history of ESBL and MRSA from urine culture    -Check urine and blood cultures    -Zosyn, pharmacy dose, monitor for toxicity    -Will hold off vancomycin at this time    #T11 compression fracture     -New T11 compression fracture on CT A/P    -Neurosurgery recommends TLSO brace and follow-up as outpatient    -Due to continued pain even after bowel movement will start morphine 2 mg IV every 4 hours as needed pain    -PT/OT    #Abdominal pain  #Constipation    -Bowel regimen ordered    -Remeron nightly    -Refusing med initially, is to be given    #Cystic pancreas    -Evident on CT A/P    -GI recommends outpatient MRI to further evaluate cystic lesions pancreas    #Pressure ulcer    -WOCN consulted and orders placed    #Parkinson's    -Continue home Sinemet    #Hyperglycemia    -ISS    SCDs for DVT prophylaxis.  Full code.  Discussed with patient.  Anticipate discharge home in 2-3 days.  Expected Discharge Date: 3/15/2025; Expected Discharge Time:       DO [facility]  03/14/25  15:21 EDT

## 2025-03-14 NOTE — PROGRESS NOTES
[facility]  Inpatient Progress Note    Reason for Followup: Abdominal pain    Subjective     Interval History:   Patient refused an enema yesterday.  He is only had a small bowel movement.  Continues to report ongoing pain.  No blood in the stool.    Current Facility-Administered Medications:     acetaminophen (TYLENOL) tablet 650 mg, 650 mg, Oral, Q4H PRN **OR** acetaminophen (TYLENOL) 160 MG/5ML oral solution 650 mg, 650 mg, Oral, Q4H PRN **OR** acetaminophen (TYLENOL) suppository 650 mg, 650 mg, Rectal, Q4H PRN, Gary Bear MD    sennosides-docusate (PERICOLACE) 8.6-50 MG per tablet 2 tablet, 2 tablet, Oral, BID PRN **AND** polyethylene glycol (MIRALAX) packet 17 g, 17 g, Oral, Daily PRN **AND** bisacodyl (DULCOLAX) EC tablet 5 mg, 5 mg, Oral, Daily PRN **AND** bisacodyl (DULCOLAX) suppository 10 mg, 10 mg, Rectal, Daily PRN, Gary Bear MD    carbidopa-levodopa (SINEMET)  MG per tablet 1.5 tablet, 1.5 tablet, Oral, 4x Daily, Gary Bear MD, 1.5 tablet at 03/14/25 0811    carbidopa-levodopa CR (SINEMET CR)  MG per CR tablet 1 tablet, 1 tablet, Oral, Nightly, Gary Bear MD, 1 tablet at 03/13/25 2138    dextrose (D50W) (25 g/50 mL) IV injection 25 g, 25 g, Intravenous, Q15 Min PRN, Gary Bear MD    dextrose (GLUTOSE) oral gel 15 g, 15 g, Oral, Q15 Min PRN, Gary Bear MD    glucagon (GLUCAGEN) injection 1 mg, 1 mg, Intramuscular, Q15 Min PRN, Gary Bear MD    HYDROcodone-acetaminophen (NORCO) 5-325 MG per tablet 1 tablet, 1 tablet, Oral, Q6H PRN, Sudeep Whitlock APRN, 1 tablet at 03/14/25 0813    insulin lispro (HUMALOG/ADMELOG) injection 2-7 Units, 2-7 Units, Subcutaneous, 4x Daily AC & at Bedtime, Gary Bear MD, 2 Units at 03/13/25 2138    mirtazapine (REMERON) tablet 45 mg, 45 mg, Oral, Nightly, Gary Bear MD, 45 mg at 03/13/25 2137    ondansetron ODT (ZOFRAN-ODT) disintegrating tablet 4 mg, 4 mg, Oral, Q6H PRN **OR** ondansetron (ZOFRAN)  injection 4 mg, 4 mg, Intravenous, Q6H PRN, Gary Bear MD    polyethylene glycol (MIRALAX) packet 17 g, 17 g, Oral, BID, Marti Straneg PA-C, 17 g at 03/14/25 0810    sennosides-docusate (PERICOLACE) 8.6-50 MG per tablet 2 tablet, 2 tablet, Oral, BID, Marti Strange PA-C, 2 tablet at 03/14/25 0810    sertraline (ZOLOFT) tablet 75 mg, 75 mg, Oral, Daily, Gary Bear MD, 75 mg at 03/14/25 0810    sodium chloride 0.9 % flush 10 mL, 10 mL, Intravenous, PRN, GettelfingerLarsYelena, APRN    sodium chloride 0.9 % flush 10 mL, 10 mL, Intravenous, Q12H, Gary Bear MD, 10 mL at 03/14/25 0811    sodium chloride 0.9 % flush 10 mL, 10 mL, Intravenous, PRN, Gary Bear MD    sodium chloride 0.9 % infusion 40 mL, 40 mL, Intravenous, PRN, Gary Bear MD    Objective     Vital Signs  Temp:  [97.4 °F (36.3 °C)-98.4 °F (36.9 °C)] 97.6 °F (36.4 °C)  Heart Rate:  [] 101  Resp:  [18-20] 20  BP: (128-147)/() 147/102  Body mass index is 20.22 kg/m².    Intake/Output Summary (Last 24 hours) at 3/14/2025 0917  Last data filed at 3/14/2025 0516  Gross per 24 hour   Intake --   Output 300 ml   Net -300 ml     No intake/output data recorded.     Physical Exam:   General: patient awake, alert and cooperative   Abdomen: soft, nontender, nondistended; normal bowel sounds     Results Review:     I reviewed the patient's new clinical results.  I reviewed the patient's new imaging results and agree with the interpretation.    Results from last 7 days   Lab Units 03/14/25  0640 03/13/25  0656 03/12/25  0710   WBC 10*3/mm3 9.90 10.26 9.30   HEMOGLOBIN g/dL 12.6* 13.0 12.8*   HEMATOCRIT % 38.0 38.7 37.7   PLATELETS 10*3/mm3 225 229 235     Results from last 7 days   Lab Units 03/14/25  0640 03/13/25  0656 03/12/25  0710   SODIUM mmol/L 141 145 142   POTASSIUM mmol/L 4.1 4.1 3.7   CHLORIDE mmol/L 106 109* 107   CO2 mmol/L 24.0 26.8 26.7   BUN mg/dL 20 18 21   CREATININE mg/dL 1.08 1.13 1.15   CALCIUM mg/dL 8.7  8.7 9.0   BILIRUBIN mg/dL  --   --  0.2   ALK PHOS U/L  --   --  123*   ALT (SGPT) U/L  --   --  7   AST (SGOT) U/L  --   --  11   GLUCOSE mg/dL 119* 113* 70         Lab Results   Lab Value Date/Time    LIPASE 17 03/12/2025 0710    LIPASE 17 03/04/2025 1744    LIPASE 20 02/24/2025 0554    LIPASE 18 02/20/2025 0944       Radiology:  CT Abdomen Pelvis Without Contrast   Final Result       1. Colonic diverticulosis. Minimal stranding seen adjacent to a   diverticulum in the descending colon questionable for a very early   diverticulitis.    2. Large amount of gas and stool seen in the rectum, with distention,   unchanged.   3. 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   Nonemergent pancreas MRI could better characterize.   4. Stable inferior endplate fracture along the right margin of the T11   vertebral body that appears new from CT performed February 24, 2025       This report was finalized on 3/12/2025 12:24 PM by Dr. Heri Stern M.D on Workstation: [id number]                Assessment & Plan   Assessment:   Abdominal pain  Constipation  Parkinson's  Diverticulosis with questionable area of early diverticulitis -no evidence of leukocytosis or fever  CT with multicystic appearing pancreas    Plan:   Possible early signs of diverticulitis but physical exam does not align with this.  He denies any fevers or chills and has no evidence of leukocytosis.  Would hold off on antibiotics for now and monitor closely for any changes.  His current pain sounds consistent with neuropathy.  Patient reports he recently started on gabapentin which may have contributed to his constipation?  Continue MiraLAX twice daily  Senokot twice daily  We discussed proceeding with an enema.  He is agreeable.  Soapsuds enema ordered  Limit narcotics as able secondary to constipation.  Encourage ambulation and increased water intake.  Low residue diet  Recommend outpatient MRI to further evaluate cystic lesions of the  pancreas.  Will likely need ongoing follow-up.    I discussed the patient's findings and my recommendations with patient.    Dictated utilizing Dragon dictation.        Marti Strange PA-C   [facility]  [address] [place] [address]  Office: [phone]

## 2025-03-14 NOTE — PROGRESS NOTES
[facility] Clinical Pharmacy Services: Piperacillin-Tazobactam Consult    Pt Name: Singh Hatch   : 1950    Indication:  cystitis    Relevant clinical data and objective history reviewed:    Past Medical History:   Diagnosis Date    Age-related osteoporosis without current pathological fracture     Atherosclerotic heart disease of native coronary artery without angina pectoris     Essential (primary) hypertension     Irritant contact dermatitis due to fecal, urinary or dual incontinence     Long term (current) use of insulin     Mixed hyperlipidemia     Other cervical disc degeneration, unspecified cervical region     Other intervertebral disc degeneration, 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     Other obstructive and reflux uropathy     Other specified anxiety disorders     Parkinson's disease without dyskinesia, without mention of fluctuations     Personal history of malignant neoplasm of prostate     Type 2 diabetes mellitus with hyperglycemia     Unspecified dementia, unspecified severity, without behavioral disturbance, psychotic disturbance, mood disturbance, and anxiety     Vitamin D deficiency, unspecified      Creatinine   Date Value Ref Range Status   2025 1.08 0.76 - 1.27 mg/dL Final   2025 1.13 0.76 - 1.27 mg/dL Final   2025 1.15 0.76 - 1.27 mg/dL Final   2023 0.94 0.73 - 1.18 mg/dL Final   2023 0.92 0.73 - 1.18 mg/dL Final   2023 1.04 0.73 - 1.18 mg/dL Final   2022 202.0 15 - 500 mg/dL Final     BUN   Date Value Ref Range Status   2025 20 8 - 23 mg/dL Final   2023 19 8 - 26 mg/dL Final     Estimated Creatinine Clearance: 57.5 mL/min (by C-G formula based on SCr of 1.08 mg/dL).    Lab Results   Component Value Date    WBC 9.90 2025     Temp Readings from Last 3 Encounters:   25 97.8 °F (36.6 °C) (Oral)   25 98.2 °F (36.8 °C) (Oral)   25 98 °F (36.7 °C) (Oral)      Assessment/Plan  Estimated CrCl >20  mL/min at this time; BMI 20 kg/m2  Will start piperacillin-tazobactam 3.375 g IV every 8 hours via extended infusion protocol    Pharmacy will continue to follow daily while on piperacillin-tazobactam and adjust as needed. Thank you for this consult.    Rio Perales, [facility]  Clinical Pharmacist

## 2025-03-14 NOTE — PLAN OF CARE
Goal Outcome Evaluation:           Progress: improving  Outcome Evaluation: Patient is alert and oriented but disoriented to time reoriented as needed, assist to feed and tolerating cc diet, coccyx red/blanchable wound care recommendations placed see note/orders, room air, increased pain this shift morphine and norco alternated, needs to be fitted for back brace see neurosurgery note for details, IV antibiotics given per MAR, enema administered no results so far, vitals stable, safety precautions in use, plan of care ongoing

## 2025-03-15 VITALS
RESPIRATION RATE: 18 BRPM | SYSTOLIC BLOOD PRESSURE: 143 MMHG | WEIGHT: 145 LBS | BODY MASS INDEX: 20.3 KG/M2 | HEIGHT: 71 IN | HEART RATE: 86 BPM | DIASTOLIC BLOOD PRESSURE: 68 MMHG | TEMPERATURE: 98.2 F | OXYGEN SATURATION: 95 %

## 2025-03-15 LAB
ANION GAP SERPL CALCULATED.3IONS-SCNC: 10.1 MMOL/L (ref 5–15)
BUN SERPL-MCNC: 17 MG/DL (ref 8–23)
BUN/CREAT SERPL: 14.8 (ref 7–25)
CALCIUM SPEC-SCNC: 9.1 MG/DL (ref 8.6–10.5)
CHLORIDE SERPL-SCNC: 105 MMOL/L (ref 98–107)
CO2 SERPL-SCNC: 23.9 MMOL/L (ref 22–29)
CREAT SERPL-MCNC: 1.15 MG/DL (ref 0.76–1.27)
DEPRECATED RDW RBC AUTO: 45.4 FL (ref 37–54)
EGFRCR SERPLBLD CKD-EPI 2021: 67.6 ML/MIN/1.73
ERYTHROCYTE [DISTWIDTH] IN BLOOD BY AUTOMATED COUNT: 13.5 % (ref 12.3–15.4)
GLUCOSE BLDC GLUCOMTR-MCNC: 119 MG/DL (ref 70–130)
GLUCOSE BLDC GLUCOMTR-MCNC: 143 MG/DL (ref 70–130)
GLUCOSE SERPL-MCNC: 107 MG/DL (ref 65–99)
HCT VFR BLD AUTO: 38.1 % (ref 37.5–51)
HGB BLD-MCNC: 12.8 G/DL (ref 13–17.7)
MCH RBC QN AUTO: 30.6 PG (ref 26.6–33)
MCHC RBC AUTO-ENTMCNC: 33.6 G/DL (ref 31.5–35.7)
MCV RBC AUTO: 91.1 FL (ref 79–97)
PLATELET # BLD AUTO: 223 10*3/MM3 (ref 140–450)
PMV BLD AUTO: 9.5 FL (ref 6–12)
POTASSIUM SERPL-SCNC: 3.9 MMOL/L (ref 3.5–5.2)
RBC # BLD AUTO: 4.18 10*6/MM3 (ref 4.14–5.8)
SODIUM SERPL-SCNC: 139 MMOL/L (ref 136–145)
WBC NRBC COR # BLD AUTO: 10.36 10*3/MM3 (ref 3.4–10.8)

## 2025-03-15 PROCEDURE — 25010000002 PIPERACILLIN SOD-TAZOBACTAM PER 1 G: Performed by: STUDENT IN AN ORGANIZED HEALTH CARE EDUCATION/TRAINING PROGRAM

## 2025-03-15 PROCEDURE — 25010000002 VANCOMYCIN HCL 1.25 G RECONSTITUTED SOLUTION 1 EACH VIAL: Performed by: STUDENT IN AN ORGANIZED HEALTH CARE EDUCATION/TRAINING PROGRAM

## 2025-03-15 PROCEDURE — 85027 COMPLETE CBC AUTOMATED: CPT | Performed by: STUDENT IN AN ORGANIZED HEALTH CARE EDUCATION/TRAINING PROGRAM

## 2025-03-15 PROCEDURE — 82948 REAGENT STRIP/BLOOD GLUCOSE: CPT

## 2025-03-15 PROCEDURE — 80048 BASIC METABOLIC PNL TOTAL CA: CPT | Performed by: STUDENT IN AN ORGANIZED HEALTH CARE EDUCATION/TRAINING PROGRAM

## 2025-03-15 PROCEDURE — 25010000002 MORPHINE PER 10 MG: Performed by: STUDENT IN AN ORGANIZED HEALTH CARE EDUCATION/TRAINING PROGRAM

## 2025-03-15 PROCEDURE — 25810000003 SODIUM CHLORIDE 0.9 % SOLUTION 250 ML FLEX CONT: Performed by: STUDENT IN AN ORGANIZED HEALTH CARE EDUCATION/TRAINING PROGRAM

## 2025-03-15 RX ORDER — SULFAMETHOXAZOLE AND TRIMETHOPRIM 800; 160 MG/1; MG/1
1 TABLET ORAL 2 TIMES DAILY
Qty: 10 TABLET | Refills: 0 | Status: SHIPPED | OUTPATIENT
Start: 2025-03-15 | End: 2025-03-20

## 2025-03-15 RX ORDER — AMOXICILLIN 250 MG
1 CAPSULE ORAL 2 TIMES DAILY
Qty: 60 TABLET | Refills: 0 | Status: SHIPPED | OUTPATIENT
Start: 2025-03-15 | End: 2025-04-14

## 2025-03-15 RX ORDER — OXYCODONE AND ACETAMINOPHEN 5; 325 MG/1; MG/1
1 TABLET ORAL EVERY 4 HOURS PRN
Qty: 20 TABLET | Refills: 0 | Status: SHIPPED | OUTPATIENT
Start: 2025-03-15

## 2025-03-15 RX ORDER — CYCLOBENZAPRINE HCL 10 MG
10 TABLET ORAL 3 TIMES DAILY PRN
Qty: 30 TABLET | Refills: 0 | Status: SHIPPED | OUTPATIENT
Start: 2025-03-15

## 2025-03-15 RX ORDER — POLYETHYLENE GLYCOL 3350 17 G/17G
17 POWDER, FOR SOLUTION ORAL 2 TIMES DAILY
Qty: 60 PACKET | Refills: 0 | Status: SHIPPED | OUTPATIENT
Start: 2025-03-15

## 2025-03-15 RX ADMIN — MORPHINE SULFATE 2 MG: 2 INJECTION, SOLUTION INTRAMUSCULAR; INTRAVENOUS at 02:27

## 2025-03-15 RX ADMIN — VANCOMYCIN HYDROCHLORIDE 1250 MG: 1.25 INJECTION, POWDER, LYOPHILIZED, FOR SOLUTION INTRAVENOUS at 09:00

## 2025-03-15 RX ADMIN — SERTRALINE HYDROCHLORIDE 75 MG: 50 TABLET, FILM COATED ORAL at 09:01

## 2025-03-15 RX ADMIN — ACETAMINOPHEN 650 MG: 325 TABLET, FILM COATED ORAL at 11:59

## 2025-03-15 RX ADMIN — Medication 10 ML: at 09:02

## 2025-03-15 RX ADMIN — CARBIDOPA AND LEVODOPA 1.5 TABLET: 25; 250 TABLET ORAL at 11:59

## 2025-03-15 RX ADMIN — POLYETHYLENE GLYCOL 3350 17 G: 17 POWDER, FOR SOLUTION ORAL at 09:01

## 2025-03-15 RX ADMIN — CARBIDOPA AND LEVODOPA 1.5 TABLET: 25; 250 TABLET ORAL at 09:01

## 2025-03-15 RX ADMIN — PIPERACILLIN AND TAZOBACTAM 3.38 G: 3; .375 INJECTION, POWDER, FOR SOLUTION INTRAVENOUS at 05:58

## 2025-03-15 RX ADMIN — SENNOSIDES AND DOCUSATE SODIUM 2 TABLET: 50; 8.6 TABLET ORAL at 09:01

## 2025-03-15 RX ADMIN — HYDROCODONE BITARTRATE AND ACETAMINOPHEN 1 TABLET: 5; 325 TABLET ORAL at 09:01

## 2025-03-15 RX ADMIN — MORPHINE SULFATE 2 MG: 2 INJECTION, SOLUTION INTRAMUSCULAR; INTRAVENOUS at 07:14

## 2025-03-15 RX ADMIN — CYCLOBENZAPRINE HYDROCHLORIDE 10 MG: 10 TABLET, FILM COATED ORAL at 09:01

## 2025-03-15 NOTE — PLAN OF CARE
Goal Outcome Evaluation:         Patient alert and oriented w/periods of forgetfulness, on room air, on bedrest until brace fitting. No walking w/o brace and HOB no greater than 45 degrees unless brace is on. Incont of b&b, zinc to coccyx wound. Purewick in place. Blood cultures pending, morphine and norco for pain. Flexeril and zosyn for muscle relaxation and ABX. 20g in right ac which is saline lock. Plan of care is ongoing.

## 2025-03-15 NOTE — PROGRESS NOTES
"[facility] Clinical Pharmacy Services: Vancomycin Pharmacokinetic Initial Consult Note    Singh Hatch is a 74 y.o. male who is on day 1 of pharmacy to dose vancomycin.    Indication: Urinary Tract Infection  Consulting Provider: Dr. Rosibel White  Planned Duration of Therapy: 5 days  Loading Dose Given: 1250 mg IV once on 3/15 at 0900  MRSA PCR performed: No  Culture/Source: UCx  Target: -600 mg/L.hr   Other Antimicrobials: Zosyn 3.375 g IV q8h    Vitals/Labs  Ht: 180.3 cm (71\"); Wt: 65.8 kg (145 lb)  Temp Readings from Last 1 Encounters:   03/15/25 98.2 °F (36.8 °C) (Oral)      Estimated Creatinine Clearance: 54 mL/min (by C-G formula based on SCr of 1.15 mg/dL).    Results from last 7 days   Lab Units 03/15/25  0808 03/14/25  0640 03/13/25  0656   CREATININE mg/dL 1.15 1.08 1.13   WBC 10*3/mm3 10.36 9.90 10.26     Assessment/Plan:    Vancomycin Dose: 1250 mg IV every 24 hours  Predictive AUC level for the dose ordered is 509 mg/L.hr, which is within the target of 400-600 mg/L.hr  Vanc Trough has not been ordered since patient may discharge today/    Pharmacy will follow patient's kidney function and will adjust doses and obtain levels as necessary. Thank you for involving pharmacy in this patient's care. Please contact pharmacy with any questions or concerns.                           Mariaelena Pearson, Pharm.D., [facility]   Clinical Pharmacist   "

## 2025-03-15 NOTE — NURSING NOTE
1310 RN called pt's brother, Mario Htach, update given regarding pt going back to [facility], TSLO brace, and pain control.

## 2025-03-15 NOTE — PROGRESS NOTES
"Physicians Statement of Medical Necessity for  Ambulance Transportation    GENERAL INFORMATION     Name: Singh Hatch  YOB: 1950  Medicare #:     [id number]     Transport Date: 3/15/2025  (Valid for round trips this date, or for scheduled repetitive trips for 60 days from the date signed below.)  Origin: [facility], [address] [place] [address]    Destination: [facility], [address] [place] [address]   Is the Patient's stay covered under Medicare Part A (PPS/DRG?)Yes  Closest appropriate facility? Yes  If this a hosp-hosp transfer? No  Is this a hospice patient? No    MEDICAL NECESSITY QUESTIONAIRE    Ambulance Transportation is medically necessary only if other means of transportation are contraindicated or would be potentially harmful to the patient.  To meet this requirement, the patient must be either \"bed confined\" or suffer from a condition such that transport by means other than an ambulance is contraindicated by the patient's condition.  The following questions must be answered by the healthcare professional signing below for this form to be valid:     1) Describe the MEDICAL CONDITION (physical and/or mental) of this patient AT THE TIME OF AMBULANCE TRANSPORT that requires the patient to be transported in an ambulance, and why transport by other means is contraindicated by the patient's condition: Intractable abdominal pain, Parkinson's Disease, Dementia  Past Medical History:   Diagnosis Date    Age-related osteoporosis without current pathological fracture     Atherosclerotic heart disease of native coronary artery without angina pectoris     Essential (primary) hypertension     Irritant contact dermatitis due to fecal, urinary or dual incontinence     Long term (current) use of insulin     Mixed hyperlipidemia     Other cervical disc degeneration, unspecified cervical region     Other intervertebral disc degeneration, lumbar region " "with discogenic back pain only     Other obstructive and reflux uropathy     Other specified anxiety disorders     Parkinson's disease without dyskinesia, without mention of fluctuations     Personal history of malignant neoplasm of prostate     Type 2 diabetes mellitus with hyperglycemia     Unspecified dementia, unspecified severity, without behavioral disturbance, psychotic disturbance, mood disturbance, and anxiety     Vitamin D deficiency, unspecified       History reviewed. No pertinent surgical history.   2) Is this patient \"bed confined\" as defined below?Yes   To be \"bed confined\" the patient must satisfy all three of the following criteria:  (1) unable to get up from bed without assistance; AND (2) unable to ambulate;  AND (3) unable to sit in a chair or wheelchair.  3) Can this patient safely be transported by car or wheelchair van (I.e., may safely sit during transport, without an attendant or monitoring?)No   4. In addition to completing questions 1-3 above, please check any of the following conditions that apply*:          *Note: supporting documentation for any boxes checked must be maintained in the patient's medical records Contractures, Patient is confused, and Unable to tolerate seated position for time needed to transport      SIGNATURE OF PHYSICIAN OR OTHER AUTHORIZED HEALTHCARE PROFESSIONAL    I certify that the above information is true and correct based on my evaluation of this patient, and represent that the patient requires transport by ambulance and that other forms of transport are contraindicated.  I understand that this information will be used by the Centers for Medicare and Medicaid Services (CMS) to support the determiniation of medical necessity for ambulance services, and I represent that I have personal knowledge of the patient's condition at the time of transport.       If this box is checked, I also certify that the patient is physically or mentally incapable of signing the " ambulance service's claim form and that the institution with which I am affiliated has furnished care, services or assistance to the patient.  My signature below is made on behalf of the patient pursuant to 42 .36(b)(4). In accordance with 42 .37, the specific reason(s) that the patient is physically or mentally incapable of signing the claim for is as follows:     Signature of Physician or Healthcare Professional  Date/Time:        (For Scheduled repetitive transport, this form is not valid for transports performed more than 60 days after this date).                                                                                                                                            --------------------------------------------------------------------------------------------  Printed Name and Credentials of Physician or Authorized Healthcare Professional     *Form must be signed by patient's attending physician for scheduled, repetitive transports,.  For non-repetitive ambulance transports, if unable to obtain the signature of the attending physician, any of the following may sign (please select below):     Physician  Clinical Nurse Specialist  Registered Nurse     Physician Assistant  Discharge Planner  Licensed Practical Nurse     Nurse Practitioner

## 2025-03-15 NOTE — DISCHARGE INSTRUCTIONS
#Discharge plan    -TLSO brace when out of bed    -Patient will need to follow-up with Dr. Bush (neurosurgery)  as outpatient    -Continue MiraLAX twice daily    -Continue Senokot twice daily    -Patient to follow-up with GI as outpatient to get MRI to evaluate cystic lesions of the pancreas    -Take Percocet 5/325 mg p.o. every 4 hours as needed pain    -Flexeril 10 mg p.o. 3 times daily as needed muscle spasms    -Take Bactrim DS 1 tablet by mouth twice a day for 5 days    -Return to long-term care

## 2025-03-15 NOTE — NURSING NOTE
1120 RN called [facility], spoke with Ray CALDERON and gave report. MATT Liriano will be transported with pt. EMS transport scheduled for 1200.

## 2025-03-15 NOTE — DISCHARGE SUMMARY
Patient Name: Singh Hatch  : 1950  MRN: [id number]    Date of Admission: 3/12/2025  Date of Discharge:  3/15/2025  Primary Care Physician: Salomon Mcknight MD      Chief Complaint:   Abdominal Pain      Discharge Diagnoses     Active Hospital Problems    Diagnosis  POA    **Intractable abdominal pain [R10.9]  Yes    Constipation [K59.00]  Unknown    Type 2 diabetes mellitus with hyperglycemia, with long-term current use of insulin [E11.65, Z79.4]  Not Applicable    Parkinson disease [G20.A1]  Yes    Dementia [F03.90]  Yes      Resolved Hospital Problems   No resolved problems to display.        Hospital Course     Mr. Hatch is a 74 y.o. male with a history of hyperlipidemia, DM, hypertension, CAD, BPH, anxiety, Parkinson's who presented to [facility] initially complaining of abdominal pain and constipation.  Please see the admitting history and physical for further details.  He was found to have constipation along with new T11 vertebral fracture and was admitted to the hospital for further evaluation and treatment.  GI started on bowel regimen, patient initially refused enema and had minimal output but after enema was given patient had BM and pain improved.  Neurosurgery placed TLSO brace and recommended follow-up as outpatient.  Pain meds were adjusted and patient's pain and condition improved and he was tolerating diet well.  UA showed UTI but he was asymptomatic, due to history of ESBL and MRSA he was covered with antibiotics.  Patient was afebrile and without leukocytosis.  As he was cleared by GI and neurosurgery and symptoms improved he was agreeable with discharge.  Patient discharged back to long-term care facility in agreement with plan below      #Discharge plan    -TLSO brace when out of bed    -Patient will need to follow-up with Dr. Bush (neurosurgery)  as outpatient    -Continue MiraLAX twice daily    -Continue Senokot twice daily    -Patient to follow-up with GI  as outpatient to get MRI to evaluate cystic lesions of the pancreas    -Take Percocet 5/325 mg p.o. every 4 hours as needed pain    -Flexeril 10 mg p.o. 3 times daily as needed muscle spasms    -Take Bactrim DS 1 tablet by mouth twice a day for 5 days    -Return to long-term care    Day of Discharge     Subjective:  Patient seen and examined at bedside, appears much improved today, he is in less distress and tolerating diet this morning.  He has had multiple bowel movements.  She states he feels improved and is agreeable to go back to LTC.  He is afebrile with leukocytosis and not symptomatic for UTI, however will treat with a course of antibiotics.    Physical Exam:  Temp:  [97.6 °F (36.4 °C)-98.2 °F (36.8 °C)] 98.2 °F (36.8 °C)  Heart Rate:  [75-91] 86  Resp:  [18] 18  BP: (122-148)/(68-87) 143/68  Body mass index is 20.22 kg/m².  Physical Exam  Constitutional:       General: He is not in acute distress.     Appearance: He is not toxic-appearing.   HENT:      Head: Normocephalic and atraumatic.      Nose: Nose normal. No congestion.      Mouth/Throat:      Pharynx: Oropharynx is clear. No oropharyngeal exudate.   Eyes:      General: No scleral icterus.  Cardiovascular:      Rate and Rhythm: Normal rate and regular rhythm.      Heart sounds: No murmur heard.     No friction rub. No gallop.   Pulmonary:      Effort: No respiratory distress.      Breath sounds: No wheezing or rales.   Abdominal:      General: There is no distension.      Tenderness: There is no abdominal tenderness. There is no guarding.   Musculoskeletal:         General: No swelling or deformity.      Cervical back: Normal range of motion. No rigidity.      Right lower leg: No edema.      Left lower leg: No edema.   Skin:     Coloration: Skin is not jaundiced.      Findings: No bruising or lesion.      Comments: Sacral ulcer bandaged and appears to be healing well   Neurological:      General: No focal deficit present.      Mental Status: He is  alert and oriented to person, place, and time.      Motor: No weakness.         Consultants     Consult Orders (all) (From admission, onward)       Start     Ordered    03/14/25 0954  Inpatient Neurosurgery Consult  Once        Specialty:  Neurosurgery  Provider:  Haider Desai MD    03/14/25 0954    03/12/25 1620  Inpatient Gastroenterology Consult  Once        Specialty:  Gastroenterology  Provider:  Moo Llanes MD    03/12/25 1621    03/12/25 1456  Inpatient Case Management  Consult  Once        Provider:  (Not yet assigned)    03/12/25 1455    03/12/25 1230  [facility] (on-call MD unless specified) Details  Once        Specialty:  Hospitalist  Provider:  Gary Bear MD    03/12/25 1229                  Procedures     * Surgery not found *    Imaging Results (All)       Procedure Component Value Units Date/Time    CT Abdomen Pelvis Without Contrast [[id number]] Collected: 03/12/25 1159     Updated: 03/12/25 1227    Narrative:      CT ABDOMEN PELVIS WO CONTRAST-     INDICATION: Abdominal pain     COMPARISON: CT abdomen pelvis March 4, 2025     TECHNIQUE:  Routine CT abdomen/pelvis without IV contrast. Coronal and sagittal  reformats. Radiation dose reduction techniques were utilized, including  automated exposure control and exposure modulation based on body size.     FINDINGS:      Lung bases: Bibasilar lung opacities, suspect atelectasis. Coronary  artery atherosclerotic calcifications. Borderline cardiomegaly.  Gynecomastia.     ABDOMEN: Small cyst seen in segment 4A of the left hepatic lobe. Normal  gallbladder. No biliary ductal dilatation. Spleen is normal in size.  Multicystic lesion seen in the pancreatic tail, series 3, axial mage 45,  measures approximately 4.7 x 2.2 cm. Mild pancreatic atrophy. No  pancreatic ductal dilatation seen. No adrenal nodules. Symmetric  perinephric edema. Small fluid attenuating right renal cysts. No  urolithiasis. No hydronephrosis.     Pelvis:  Prostatectomy. Unremarkable bladder. No bladder calculus.     Bowel: No small bowel obstruction. Colonic diverticulosis. Minimal  stranding seen adjacent to a diverticulum in the left mid descending  colon, series 3, axial mage 73, without extraluminal gas or pericolonic  fluid collection. Large amount of gas and stool seen in the rectum, with  distention. Normal appendix.     Abdominal wall: Pelvic wall scarring.     Retroperitoneum: No lymphadenopathy.     Vasculature: Mild aortoiliac atherosclerotic calcification. No abdominal  aortic aneurysm.     Osseous structures: Old right-sided rib fractures. Posterior lumbosacral  fusion extends from L3 to through the sacroiliac joints. Anterior lumbar  interbody fusion at L5/S1. Stable fracture seen along the right lateral  margin of the T11 inferior endplate on series 3, axial mage 43, with  involvement of the anterior column and without extension in the middle  or posterior column, appears new from CT performed February 24, 2025.       Impression:         1. Colonic diverticulosis. Minimal stranding seen adjacent to a  diverticulum in the descending colon questionable for a very early  diverticulitis.   2. Large amount of gas and stool seen in the rectum, with distention,  unchanged.  3. 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  Nonemergent pancreas MRI could better characterize.  4. Stable inferior endplate fracture along the right margin of the T11  vertebral body that appears new from CT performed February 24, 2025     This report was finalized on 3/12/2025 12:24 PM by Dr. Heri Stern M.D on Workstation: [id number]                 Pertinent Labs     Results from last 7 days   Lab Units 03/15/25  0808 03/14/25  0640 03/13/25  0656 03/12/25  0710   WBC 10*3/mm3 10.36 9.90 10.26 9.30   HEMOGLOBIN g/dL 12.8* 12.6* 13.0 12.8*   PLATELETS 10*3/mm3 223 225 229 235     Results from last 7 days   Lab Units 03/15/25  0808 03/14/25  0640  "03/13/25  0656 03/12/25  0710   SODIUM mmol/L 139 141 145 142   POTASSIUM mmol/L 3.9 4.1 4.1 3.7   CHLORIDE mmol/L 105 106 109* 107   CO2 mmol/L 23.9 24.0 26.8 26.7   BUN mg/dL 17 20 18 21   CREATININE mg/dL 1.15 1.08 1.13 1.15   GLUCOSE mg/dL 107* 119* 113* 70   EGFR mL/min/1.73 67.6 72.9 69.1 67.6     Results from last 7 days   Lab Units 03/12/25  0710   ALBUMIN g/dL 3.8   BILIRUBIN mg/dL 0.2   ALK PHOS U/L 123*   AST (SGOT) U/L 11   ALT (SGPT) U/L 7     Results from last 7 days   Lab Units 03/15/25  0808 03/14/25  0640 03/13/25  0656 03/12/25  0710   CALCIUM mg/dL 9.1 8.7 8.7 9.0   ALBUMIN g/dL  --   --   --  3.8     Results from last 7 days   Lab Units 03/12/25  0710   LIPASE U/L 17             Invalid input(s): \"LDLCALC\"  Results from last 7 days   Lab Units 03/14/25  1606   MRSAPCR  MRSA Detected*         Test Results Pending at Discharge     Pending Results       Procedure [Order ID] Specimen - Date/Time    Blood Culture - Blood, Arm, Right [[id number]] Collected: 03/14/25 1611    Specimen: Blood from Arm, Right Updated: 03/14/25 1636    Blood Culture - Blood, Hand, Right [[id number]] Collected: 03/14/25 1601    Specimen: Blood from Hand, Right Updated: 03/14/25 1637    Urine Culture - Urine, Urine, Clean Catch [[id number]] Collected: 03/14/25 1438    Specimen: Urine, Clean Catch Updated: 03/14/25 1540              Discharge Details        Discharge Medications        New Medications        Instructions Start Date   cyclobenzaprine 10 MG tablet  Commonly known as: FLEXERIL   10 mg, Oral, 3 Times Daily PRN      oxyCODONE-acetaminophen 5-325 MG per tablet  Commonly known as: Percocet   1 tablet, Oral, Every 4 Hours PRN      polyethylene glycol 17 g packet  Commonly known as: MIRALAX  Replaces: polyethylene glycol 17 GM/SCOOP powder   17 g, Oral, 2 Times Daily      sulfamethoxazole-trimethoprim 800-160 MG per tablet  Commonly known as: Bactrim DS   1 tablet, Oral, 2 Times Daily             Changes to Medications  "       Instructions Start Date   sennosides-docusate 8.6-50 MG per tablet  Commonly known as: PERICOLACE  What changed: when to take this   1 tablet, Oral, 2 Times Daily             Continue These Medications        Instructions Start Date   aspirin 81 MG EC tablet   81 mg, Daily      bisacodyl 10 MG suppository  Commonly known as: Dulcolax   10 mg, Rectal, Daily PRN      carbidopa-levodopa CR  MG per CR tablet  Commonly known as: SINEMET CR   1 tablet, Nightly      carbidopa-levodopa  MG per tablet  Commonly known as: SINEMET   1.5 tablets, 4 Times Daily      dextrose 40 % gel  Commonly known as: GLUTOSE   15 g, Oral, Every 1 Hour PRN      dicyclomine 20 MG tablet  Commonly known as: BENTYL   20 mg, Oral, Every 6 Hours      gabapentin 300 MG capsule  Commonly known as: NEURONTIN   300 mg, 2 Times Daily      glucagon (human recombinant) 1 MG injection  Commonly known as: GLUCAGEN DIAGNOSTIC   1 mg, Intravenous, Once      insulin glargine 100 UNIT/ML injection  Commonly known as: LANTUS, SEMGLEE   40 Units, Nightly      Insulin Lispro 100 UNIT/ML injection  Commonly known as: humaLOG   2-10 Units, 3 Times Daily Before Meals      miconazole 2 % cream  Commonly known as: MICOTIN   1 Application, 2 Times Daily      mirtazapine 30 MG tablet  Commonly known as: REMERON   45 mg, Nightly      sertraline 25 MG tablet  Commonly known as: ZOLOFT   75 mg, Daily      vitamin B-12 1000 MCG tablet  Commonly known as: CYANOCOBALAMIN   1,000 mcg, Daily      vitamin D 1.25 MG (38463 UT) capsule capsule  Commonly known as: ERGOCALCIFEROL   50,000 Units, Weekly             Stop These Medications      cephalexin 500 MG capsule  Commonly known as: KEFLEX     polyethylene glycol 17 GM/SCOOP powder  Commonly known as: MIRALAX  Replaced by: polyethylene glycol 17 g packet              No Known Allergies    Discharge Disposition:  Long Term Care (DC - External)      Discharge Diet:  Diet Order   Procedures    Diet: Diabetic,  Gastrointestinal; Consistent Carbohydrate; Fiber-Restricted; Fluid Consistency: Thin (IDDSI 0)       Discharge Activity:       CODE STATUS:    Code Status and Medical Interventions: CPR (Attempt to Resuscitate); Full Support   Ordered at: 03/12/25 1622     Code Status (Patient has no pulse and is not breathing):    CPR (Attempt to Resuscitate)     Medical Interventions (Patient has pulse or is breathing):    Full Support     Level Of Support Discussed With:    Patient       No future appointments.   Contact information for follow-up providers       Haider Desai MD. Schedule an appointment as soon as possible for a visit in 1 month(s).    Specialty: Neurosurgery  Contact information:  [address]  [place] [id number]  [phone]               Moo Llanes MD. Schedule an appointment as soon as possible for a visit in 1 month(s).    Specialty: Gastroenterology  Contact information:  [address]  [place] [address]  [phone]               Salomon Mcknight MD .    Specialty: Internal Medicine  Contact information:  [address] [place] [id number] [phone]                       Contact information for after-discharge care       Destination       [facility] .    Service: Intermediate Care  Contact information:  [address] [facility] [id number] [phone]                                   Time Spent on Discharge: 45 minutes      Rosibel White DO  [facility]  03/15/25  09:25 EDT

## 2025-03-15 NOTE — PROGRESS NOTES
Continued Stay Note  [facility]     Patient Name: Singh Hatch  MRN: [id number]  Today's Date: 3/15/2025    Admit Date: 3/12/2025    Plan: Return to [facility] via [facility] at 1200 today.........Elida RN   Discharge Plan       Row Name 03/15/25 0950       Plan    Plan Return to [facility] via [facility] at 1200 today.........Elida CALDERON    Patient/Family in Agreement with Plan yes    Plan Comments Inbound call from MD to notify CCP of planned DC today and need for transport back to facility. Reviewed PT notes and D/W RN, patient appropriate for EMS transport. [facility], transport arranged for 1200 noon today. Update to MD and RN..............Elida RN                   Discharge Codes    No documentation.                 Expected Discharge Date and Time       Expected Discharge Date Expected Discharge Time    Mar 15, 2025               Rupinder Ibanez, RN

## 2025-03-16 LAB — BACTERIA SPEC AEROBE CULT: ABNORMAL

## 2025-03-16 NOTE — CASE MANAGEMENT/SOCIAL WORK
Case Management Discharge Note      Final Note: [facility]         Selected Continued Care - Discharged on 3/15/2025 Admission date: 3/12/2025 - Discharge disposition: Long Term Care (DC - External)      Destination Coordination complete.      Service Provider Services Address Phone Fax Patient Preferred    [facility] [address] [address] [phone] --              Durable Medical Equipment    No services have been selected for the patient.                Dialysis/Infusion    No services have been selected for the patient.                Home Medical Care    No services have been selected for the patient.                Therapy    No services have been selected for the patient.                Community Resources    No services have been selected for the patient.                Community & DME    No services have been selected for the patient.                    Transportation Services  Ambulance: [facility]    Final Discharge Disposition Code: 04 - [facility] care facility

## 2025-03-19 LAB
BACTERIA SPEC AEROBE CULT: NORMAL
BACTERIA SPEC AEROBE CULT: NORMAL

## 2025-03-22 ENCOUNTER — APPOINTMENT (OUTPATIENT)
Dept: GENERAL RADIOLOGY | Facility: HOSPITAL | Age: 75
DRG: 682 | End: 2025-03-22
Payer: MEDICARE

## 2025-03-22 ENCOUNTER — HOSPITAL ENCOUNTER (INPATIENT)
Facility: HOSPITAL | Age: 75
LOS: 5 days | Discharge: NURSING, REHAB OR LONG-TERM CARE | DRG: 682 | End: 2025-03-30
Attending: EMERGENCY MEDICINE | Admitting: INTERNAL MEDICINE
Payer: MEDICARE

## 2025-03-22 ENCOUNTER — APPOINTMENT (OUTPATIENT)
Dept: CT IMAGING | Facility: HOSPITAL | Age: 75
DRG: 682 | End: 2025-03-22
Payer: MEDICARE

## 2025-03-22 DIAGNOSIS — N17.9 AKI (ACUTE KIDNEY INJURY): ICD-10-CM

## 2025-03-22 DIAGNOSIS — S22.080D COMPRESSION FRACTURE OF T11 VERTEBRA WITH ROUTINE HEALING, SUBSEQUENT ENCOUNTER: Primary | ICD-10-CM

## 2025-03-22 DIAGNOSIS — M62.82 NON-TRAUMATIC RHABDOMYOLYSIS: ICD-10-CM

## 2025-03-22 DIAGNOSIS — Z79.899 POLYPHARMACY: ICD-10-CM

## 2025-03-22 DIAGNOSIS — R41.82 ALTERED MENTAL STATUS, UNSPECIFIED ALTERED MENTAL STATUS TYPE: ICD-10-CM

## 2025-03-22 LAB
ALBUMIN SERPL-MCNC: 3.8 G/DL (ref 3.5–5.2)
ALBUMIN/GLOB SERPL: 1.2 G/DL
ALP SERPL-CCNC: 132 U/L (ref 39–117)
ALT SERPL W P-5'-P-CCNC: 5 U/L (ref 1–41)
AMMONIA BLD-SCNC: 16 UMOL/L (ref 16–60)
ANION GAP SERPL CALCULATED.3IONS-SCNC: 11.4 MMOL/L (ref 5–15)
ANION GAP SERPL CALCULATED.3IONS-SCNC: 11.4 MMOL/L (ref 5–15)
AST SERPL-CCNC: 26 U/L (ref 1–40)
B PARAPERT DNA SPEC QL NAA+PROBE: NOT DETECTED
B PERT DNA SPEC QL NAA+PROBE: NOT DETECTED
BASOPHILS # BLD AUTO: 0.04 10*3/MM3 (ref 0–0.2)
BASOPHILS NFR BLD AUTO: 0.3 % (ref 0–1.5)
BILIRUB SERPL-MCNC: 0.4 MG/DL (ref 0–1.2)
BILIRUB UR QL STRIP: NEGATIVE
BUN SERPL-MCNC: 36 MG/DL (ref 8–23)
BUN SERPL-MCNC: 40 MG/DL (ref 8–23)
BUN/CREAT SERPL: 25.4 (ref 7–25)
BUN/CREAT SERPL: 26.5 (ref 7–25)
C PNEUM DNA NPH QL NAA+NON-PROBE: NOT DETECTED
CALCIUM SPEC-SCNC: 9 MG/DL (ref 8.6–10.5)
CALCIUM SPEC-SCNC: 9.1 MG/DL (ref 8.6–10.5)
CHLORIDE SERPL-SCNC: 105 MMOL/L (ref 98–107)
CHLORIDE SERPL-SCNC: 107 MMOL/L (ref 98–107)
CK SERPL-CCNC: 548 U/L (ref 20–200)
CK SERPL-CCNC: 754 U/L (ref 20–200)
CLARITY UR: CLEAR
CO2 SERPL-SCNC: 20.6 MMOL/L (ref 22–29)
CO2 SERPL-SCNC: 24.6 MMOL/L (ref 22–29)
COLOR UR: YELLOW
CREAT SERPL-MCNC: 1.42 MG/DL (ref 0.76–1.27)
CREAT SERPL-MCNC: 1.51 MG/DL (ref 0.76–1.27)
DEPRECATED RDW RBC AUTO: 43.4 FL (ref 37–54)
DEPRECATED RDW RBC AUTO: 45.4 FL (ref 37–54)
EGFRCR SERPLBLD CKD-EPI 2021: 48.8 ML/MIN/1.73
EGFRCR SERPLBLD CKD-EPI 2021: 52.5 ML/MIN/1.73
EOSINOPHIL # BLD AUTO: 0.03 10*3/MM3 (ref 0–0.4)
EOSINOPHIL NFR BLD AUTO: 0.2 % (ref 0.3–6.2)
ERYTHROCYTE [DISTWIDTH] IN BLOOD BY AUTOMATED COUNT: 13.4 % (ref 12.3–15.4)
ERYTHROCYTE [DISTWIDTH] IN BLOOD BY AUTOMATED COUNT: 13.5 % (ref 12.3–15.4)
FLUAV SUBTYP SPEC NAA+PROBE: NOT DETECTED
FLUBV RNA ISLT QL NAA+PROBE: NOT DETECTED
GLOBULIN UR ELPH-MCNC: 3.2 GM/DL
GLUCOSE BLDC GLUCOMTR-MCNC: 106 MG/DL (ref 70–130)
GLUCOSE BLDC GLUCOMTR-MCNC: 112 MG/DL (ref 70–130)
GLUCOSE BLDC GLUCOMTR-MCNC: 112 MG/DL (ref 70–130)
GLUCOSE BLDC GLUCOMTR-MCNC: 119 MG/DL (ref 70–130)
GLUCOSE BLDC GLUCOMTR-MCNC: 99 MG/DL (ref 70–130)
GLUCOSE SERPL-MCNC: 108 MG/DL (ref 65–99)
GLUCOSE SERPL-MCNC: 85 MG/DL (ref 65–99)
GLUCOSE UR STRIP-MCNC: NEGATIVE MG/DL
HADV DNA SPEC NAA+PROBE: NOT DETECTED
HCOV 229E RNA SPEC QL NAA+PROBE: NOT DETECTED
HCOV HKU1 RNA SPEC QL NAA+PROBE: NOT DETECTED
HCOV NL63 RNA SPEC QL NAA+PROBE: NOT DETECTED
HCOV OC43 RNA SPEC QL NAA+PROBE: NOT DETECTED
HCT VFR BLD AUTO: 40.6 % (ref 37.5–51)
HCT VFR BLD AUTO: 43.4 % (ref 37.5–51)
HGB BLD-MCNC: 13.9 G/DL (ref 13–17.7)
HGB BLD-MCNC: 14 G/DL (ref 13–17.7)
HGB UR QL STRIP.AUTO: NEGATIVE
HMPV RNA NPH QL NAA+NON-PROBE: NOT DETECTED
HOLD SPECIMEN: NORMAL
HOLD SPECIMEN: NORMAL
HPIV1 RNA ISLT QL NAA+PROBE: NOT DETECTED
HPIV2 RNA SPEC QL NAA+PROBE: NOT DETECTED
HPIV3 RNA NPH QL NAA+PROBE: NOT DETECTED
HPIV4 P GENE NPH QL NAA+PROBE: NOT DETECTED
IMM GRANULOCYTES # BLD AUTO: 0.05 10*3/MM3 (ref 0–0.05)
IMM GRANULOCYTES NFR BLD AUTO: 0.4 % (ref 0–0.5)
KETONES UR QL STRIP: ABNORMAL
LEUKOCYTE ESTERASE UR QL STRIP.AUTO: NEGATIVE
LYMPHOCYTES # BLD AUTO: 1.6 10*3/MM3 (ref 0.7–3.1)
LYMPHOCYTES NFR BLD AUTO: 12 % (ref 19.6–45.3)
M PNEUMO IGG SER IA-ACNC: NOT DETECTED
MAGNESIUM SERPL-MCNC: 2.2 MG/DL (ref 1.6–2.4)
MCH RBC QN AUTO: 29.4 PG (ref 26.6–33)
MCH RBC QN AUTO: 30.5 PG (ref 26.6–33)
MCHC RBC AUTO-ENTMCNC: 32 G/DL (ref 31.5–35.7)
MCHC RBC AUTO-ENTMCNC: 34.5 G/DL (ref 31.5–35.7)
MCV RBC AUTO: 88.5 FL (ref 79–97)
MCV RBC AUTO: 91.9 FL (ref 79–97)
MONOCYTES # BLD AUTO: 2.03 10*3/MM3 (ref 0.1–0.9)
MONOCYTES NFR BLD AUTO: 15.2 % (ref 5–12)
NEUTROPHILS NFR BLD AUTO: 71.9 % (ref 42.7–76)
NEUTROPHILS NFR BLD AUTO: 9.59 10*3/MM3 (ref 1.7–7)
NITRITE UR QL STRIP: NEGATIVE
NRBC BLD AUTO-RTO: 0 /100 WBC (ref 0–0.2)
PH UR STRIP.AUTO: <=5 [PH] (ref 5–8)
PLATELET # BLD AUTO: 244 10*3/MM3 (ref 140–450)
PLATELET # BLD AUTO: 293 10*3/MM3 (ref 140–450)
PMV BLD AUTO: 9.5 FL (ref 6–12)
PMV BLD AUTO: 9.6 FL (ref 6–12)
POTASSIUM SERPL-SCNC: 4.7 MMOL/L (ref 3.5–5.2)
POTASSIUM SERPL-SCNC: 4.7 MMOL/L (ref 3.5–5.2)
PROT SERPL-MCNC: 7 G/DL (ref 6–8.5)
PROT UR QL STRIP: ABNORMAL
QT INTERVAL: 336 MS
QTC INTERVAL: 443 MS
RBC # BLD AUTO: 4.59 10*6/MM3 (ref 4.14–5.8)
RBC # BLD AUTO: 4.72 10*6/MM3 (ref 4.14–5.8)
RHINOVIRUS RNA SPEC NAA+PROBE: NOT DETECTED
RSV RNA NPH QL NAA+NON-PROBE: NOT DETECTED
SARS-COV-2 RNA NPH QL NAA+NON-PROBE: NOT DETECTED
SODIUM SERPL-SCNC: 139 MMOL/L (ref 136–145)
SODIUM SERPL-SCNC: 141 MMOL/L (ref 136–145)
SP GR UR STRIP: 1.03 (ref 1–1.03)
UROBILINOGEN UR QL STRIP: ABNORMAL
WBC NRBC COR # BLD AUTO: 13.34 10*3/MM3 (ref 3.4–10.8)
WBC NRBC COR # BLD AUTO: 9.19 10*3/MM3 (ref 3.4–10.8)
WHOLE BLOOD HOLD COAG: NORMAL
WHOLE BLOOD HOLD SPECIMEN: NORMAL

## 2025-03-22 PROCEDURE — 93010 ELECTROCARDIOGRAM REPORT: CPT | Performed by: INTERNAL MEDICINE

## 2025-03-22 PROCEDURE — 83735 ASSAY OF MAGNESIUM: CPT | Performed by: EMERGENCY MEDICINE

## 2025-03-22 PROCEDURE — 80053 COMPREHEN METABOLIC PANEL: CPT | Performed by: EMERGENCY MEDICINE

## 2025-03-22 PROCEDURE — G0378 HOSPITAL OBSERVATION PER HR: HCPCS

## 2025-03-22 PROCEDURE — 82140 ASSAY OF AMMONIA: CPT | Performed by: EMERGENCY MEDICINE

## 2025-03-22 PROCEDURE — 36415 COLL VENOUS BLD VENIPUNCTURE: CPT | Performed by: NURSE PRACTITIONER

## 2025-03-22 PROCEDURE — 82948 REAGENT STRIP/BLOOD GLUCOSE: CPT

## 2025-03-22 PROCEDURE — 25810000003 LACTATED RINGERS SOLUTION: Performed by: EMERGENCY MEDICINE

## 2025-03-22 PROCEDURE — 82550 ASSAY OF CK (CPK): CPT | Performed by: NURSE PRACTITIONER

## 2025-03-22 PROCEDURE — 0202U NFCT DS 22 TRGT SARS-COV-2: CPT | Performed by: PHYSICIAN ASSISTANT

## 2025-03-22 PROCEDURE — 81003 URINALYSIS AUTO W/O SCOPE: CPT | Performed by: EMERGENCY MEDICINE

## 2025-03-22 PROCEDURE — 85025 COMPLETE CBC W/AUTO DIFF WBC: CPT | Performed by: EMERGENCY MEDICINE

## 2025-03-22 PROCEDURE — 70450 CT HEAD/BRAIN W/O DYE: CPT

## 2025-03-22 PROCEDURE — 93005 ELECTROCARDIOGRAM TRACING: CPT | Performed by: EMERGENCY MEDICINE

## 2025-03-22 PROCEDURE — 25810000003 SODIUM CHLORIDE 0.9 % SOLUTION: Performed by: NURSE PRACTITIONER

## 2025-03-22 PROCEDURE — 85027 COMPLETE CBC AUTOMATED: CPT | Performed by: NURSE PRACTITIONER

## 2025-03-22 PROCEDURE — 82550 ASSAY OF CK (CPK): CPT | Performed by: EMERGENCY MEDICINE

## 2025-03-22 PROCEDURE — 71045 X-RAY EXAM CHEST 1 VIEW: CPT

## 2025-03-22 PROCEDURE — P9612 CATHETERIZE FOR URINE SPEC: HCPCS

## 2025-03-22 PROCEDURE — 99285 EMERGENCY DEPT VISIT HI MDM: CPT

## 2025-03-22 RX ORDER — ACETAMINOPHEN 650 MG/1
650 SUPPOSITORY RECTAL EVERY 4 HOURS PRN
Status: DISCONTINUED | OUTPATIENT
Start: 2025-03-22 | End: 2025-03-30 | Stop reason: HOSPADM

## 2025-03-22 RX ORDER — ONDANSETRON 4 MG/1
4 TABLET, ORALLY DISINTEGRATING ORAL EVERY 6 HOURS PRN
Status: DISCONTINUED | OUTPATIENT
Start: 2025-03-22 | End: 2025-03-30 | Stop reason: HOSPADM

## 2025-03-22 RX ORDER — BISACODYL 10 MG
10 SUPPOSITORY, RECTAL RECTAL DAILY PRN
Status: DISCONTINUED | OUTPATIENT
Start: 2025-03-22 | End: 2025-03-30 | Stop reason: HOSPADM

## 2025-03-22 RX ORDER — GABAPENTIN 300 MG/1
300 CAPSULE ORAL 2 TIMES DAILY
Status: DISCONTINUED | OUTPATIENT
Start: 2025-03-22 | End: 2025-03-30 | Stop reason: HOSPADM

## 2025-03-22 RX ORDER — SODIUM CHLORIDE 0.9 % (FLUSH) 0.9 %
10 SYRINGE (ML) INJECTION AS NEEDED
Status: DISCONTINUED | OUTPATIENT
Start: 2025-03-22 | End: 2025-03-30 | Stop reason: HOSPADM

## 2025-03-22 RX ORDER — CARBIDOPA AND LEVODOPA 50; 200 MG/1; MG/1
1 TABLET, EXTENDED RELEASE ORAL NIGHTLY
Status: DISCONTINUED | OUTPATIENT
Start: 2025-03-22 | End: 2025-03-30 | Stop reason: HOSPADM

## 2025-03-22 RX ORDER — SODIUM CHLORIDE 9 MG/ML
100 INJECTION, SOLUTION INTRAVENOUS CONTINUOUS
Status: ACTIVE | OUTPATIENT
Start: 2025-03-22 | End: 2025-03-23

## 2025-03-22 RX ORDER — BISACODYL 5 MG/1
5 TABLET, DELAYED RELEASE ORAL DAILY PRN
Status: DISCONTINUED | OUTPATIENT
Start: 2025-03-22 | End: 2025-03-30 | Stop reason: HOSPADM

## 2025-03-22 RX ORDER — NICOTINE POLACRILEX 4 MG
15 LOZENGE BUCCAL
Status: DISCONTINUED | OUTPATIENT
Start: 2025-03-22 | End: 2025-03-30 | Stop reason: HOSPADM

## 2025-03-22 RX ORDER — NICOTINE POLACRILEX 4 MG
15 LOZENGE BUCCAL
Status: DISCONTINUED | OUTPATIENT
Start: 2025-03-22 | End: 2025-03-22 | Stop reason: SDUPTHER

## 2025-03-22 RX ORDER — POLYETHYLENE GLYCOL 3350 17 G/17G
17 POWDER, FOR SOLUTION ORAL DAILY PRN
Status: DISCONTINUED | OUTPATIENT
Start: 2025-03-22 | End: 2025-03-30 | Stop reason: HOSPADM

## 2025-03-22 RX ORDER — MULTIVITAMIN WITH IRON
1000 TABLET ORAL DAILY
Status: DISCONTINUED | OUTPATIENT
Start: 2025-03-22 | End: 2025-03-30 | Stop reason: HOSPADM

## 2025-03-22 RX ORDER — ASPIRIN 81 MG/1
81 TABLET ORAL DAILY
Status: DISCONTINUED | OUTPATIENT
Start: 2025-03-22 | End: 2025-03-30 | Stop reason: HOSPADM

## 2025-03-22 RX ORDER — AMOXICILLIN 250 MG
1 CAPSULE ORAL 2 TIMES DAILY
Status: DISCONTINUED | OUTPATIENT
Start: 2025-03-22 | End: 2025-03-30 | Stop reason: HOSPADM

## 2025-03-22 RX ORDER — CALCIUM CARBONATE 500 MG/1
2 TABLET, CHEWABLE ORAL 2 TIMES DAILY PRN
Status: DISCONTINUED | OUTPATIENT
Start: 2025-03-22 | End: 2025-03-30 | Stop reason: HOSPADM

## 2025-03-22 RX ORDER — IBUPROFEN 600 MG/1
1 TABLET ORAL
Status: DISCONTINUED | OUTPATIENT
Start: 2025-03-22 | End: 2025-03-30 | Stop reason: HOSPADM

## 2025-03-22 RX ORDER — SODIUM CHLORIDE 0.9 % (FLUSH) 0.9 %
10 SYRINGE (ML) INJECTION EVERY 12 HOURS SCHEDULED
Status: DISCONTINUED | OUTPATIENT
Start: 2025-03-22 | End: 2025-03-30 | Stop reason: HOSPADM

## 2025-03-22 RX ORDER — SODIUM CHLORIDE 9 MG/ML
40 INJECTION, SOLUTION INTRAVENOUS AS NEEDED
Status: DISCONTINUED | OUTPATIENT
Start: 2025-03-22 | End: 2025-03-30 | Stop reason: HOSPADM

## 2025-03-22 RX ORDER — ACETAMINOPHEN 325 MG/1
650 TABLET ORAL EVERY 4 HOURS PRN
Status: DISCONTINUED | OUTPATIENT
Start: 2025-03-22 | End: 2025-03-30 | Stop reason: HOSPADM

## 2025-03-22 RX ORDER — AMOXICILLIN 250 MG
2 CAPSULE ORAL 2 TIMES DAILY PRN
Status: DISCONTINUED | OUTPATIENT
Start: 2025-03-22 | End: 2025-03-30 | Stop reason: HOSPADM

## 2025-03-22 RX ORDER — MIRTAZAPINE 15 MG/1
45 TABLET, FILM COATED ORAL NIGHTLY
Status: DISCONTINUED | OUTPATIENT
Start: 2025-03-22 | End: 2025-03-30 | Stop reason: HOSPADM

## 2025-03-22 RX ORDER — CARBIDOPA/LEVODOPA 25MG-250MG
1.5 TABLET ORAL 4 TIMES DAILY
Status: DISCONTINUED | OUTPATIENT
Start: 2025-03-22 | End: 2025-03-30 | Stop reason: HOSPADM

## 2025-03-22 RX ORDER — DEXTROSE MONOHYDRATE 25 G/50ML
25 INJECTION, SOLUTION INTRAVENOUS
Status: DISCONTINUED | OUTPATIENT
Start: 2025-03-22 | End: 2025-03-30 | Stop reason: HOSPADM

## 2025-03-22 RX ORDER — POLYETHYLENE GLYCOL 3350 17 G/17G
17 POWDER, FOR SOLUTION ORAL 2 TIMES DAILY
Status: DISCONTINUED | OUTPATIENT
Start: 2025-03-22 | End: 2025-03-30 | Stop reason: HOSPADM

## 2025-03-22 RX ORDER — ACETAMINOPHEN 160 MG/5ML
650 SOLUTION ORAL EVERY 4 HOURS PRN
Status: DISCONTINUED | OUTPATIENT
Start: 2025-03-22 | End: 2025-03-30 | Stop reason: HOSPADM

## 2025-03-22 RX ORDER — MICONAZOLE NITRATE 20 MG/G
1 CREAM TOPICAL 2 TIMES DAILY
Status: DISCONTINUED | OUTPATIENT
Start: 2025-03-22 | End: 2025-03-30 | Stop reason: HOSPADM

## 2025-03-22 RX ORDER — ONDANSETRON 2 MG/ML
4 INJECTION INTRAMUSCULAR; INTRAVENOUS EVERY 6 HOURS PRN
Status: DISCONTINUED | OUTPATIENT
Start: 2025-03-22 | End: 2025-03-30 | Stop reason: HOSPADM

## 2025-03-22 RX ORDER — INSULIN LISPRO 100 [IU]/ML
2-7 INJECTION, SOLUTION INTRAVENOUS; SUBCUTANEOUS
Status: DISCONTINUED | OUTPATIENT
Start: 2025-03-22 | End: 2025-03-30 | Stop reason: HOSPADM

## 2025-03-22 RX ORDER — CYCLOBENZAPRINE HCL 10 MG
10 TABLET ORAL 3 TIMES DAILY PRN
Status: DISCONTINUED | OUTPATIENT
Start: 2025-03-22 | End: 2025-03-30 | Stop reason: HOSPADM

## 2025-03-22 RX ADMIN — ASPIRIN 81 MG: 81 TABLET, COATED ORAL at 16:58

## 2025-03-22 RX ADMIN — CARBIDOPA AND LEVODOPA 1.5 TABLET: 25; 250 TABLET ORAL at 18:30

## 2025-03-22 RX ADMIN — Medication 10 ML: at 08:46

## 2025-03-22 RX ADMIN — GABAPENTIN 300 MG: 300 CAPSULE ORAL at 20:32

## 2025-03-22 RX ADMIN — POLYETHYLENE GLYCOL 3350 17 G: 17 POWDER, FOR SOLUTION ORAL at 20:32

## 2025-03-22 RX ADMIN — SODIUM CHLORIDE 100 ML/HR: 9 INJECTION, SOLUTION INTRAVENOUS at 06:33

## 2025-03-22 RX ADMIN — Medication 1000 MCG: at 16:58

## 2025-03-22 RX ADMIN — SODIUM CHLORIDE, SODIUM LACTATE, POTASSIUM CHLORIDE, CALCIUM CHLORIDE 1000 ML: 20; 30; 600; 310 INJECTION, SOLUTION INTRAVENOUS at 02:46

## 2025-03-22 RX ADMIN — SENNOSIDES AND DOCUSATE SODIUM 1 TABLET: 50; 8.6 TABLET ORAL at 20:32

## 2025-03-22 RX ADMIN — CARBIDOPA AND LEVODOPA 1 TABLET: 50; 200 TABLET, EXTENDED RELEASE ORAL at 20:25

## 2025-03-22 RX ADMIN — SERTRALINE HYDROCHLORIDE 75 MG: 50 TABLET, FILM COATED ORAL at 16:58

## 2025-03-22 RX ADMIN — MIRTAZAPINE 45 MG: 15 TABLET, FILM COATED ORAL at 20:32

## 2025-03-22 NOTE — H&P
Patient Name:  Singh Hatch  YOB: 1950  MRN:  [id number]  Admit Date:  3/22/2025  Patient Care Team:  Salomon Mcknight MD as PCP - General (Internal Medicine)      Subjective   History Present Illness     Chief Complaint   Patient presents with    Altered Mental Status       Mr. Hatch is a 74 y.o. male with a history of Parkinson's disease, osteoporosis with recent T11 vertebral fracture, HTN, recent admission for constipation, degenerative disc disease, history of prostate cancer, diabetes, anxiety and dementia that was sent in to [facility] ED by NH staff due to concerns of altered mental status.      They say the nursing home told them that he has had a decline over the past 3 days. The nurse who came on at 6 noticed and progressively worsening, which prompted her to call EMS. They did not report any fever, vomiting, shortness of breath, or other associated symptoms.       He was discharged from this facility about 1 week ago after short stay for constipation resolved with enema as well as new T11 vertebral fracture for which neurosurgery recommended TLSO brace when out of bed.  At that time he was also discharged on Bactrim for 5 days for abnormal urinalysis without symptoms for UTI.    History of Present Illness      Review of Systems     Personal History     Past Medical History:   Diagnosis Date    Age-related osteoporosis without current pathological fracture     Atherosclerotic heart disease of native coronary artery without angina pectoris     Essential (primary) hypertension     Irritant contact dermatitis due to fecal, urinary or dual incontinence     Long term (current) use of insulin     Mixed hyperlipidemia     Other cervical disc degeneration, unspecified cervical region     Other intervertebral disc degeneration, 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     Other obstructive and reflux uropathy     Other specified anxiety disorders     Parkinson's  disease without dyskinesia, without mention of fluctuations     Personal history of malignant neoplasm of prostate     Type 2 diabetes mellitus with hyperglycemia     Unspecified dementia, unspecified severity, without behavioral disturbance, psychotic disturbance, mood disturbance, and anxiety     Vitamin D deficiency, unspecified      History reviewed. No pertinent surgical history.  History reviewed. No pertinent family history.  Social History     Tobacco Use    Smoking status: Never     Passive exposure: Never    Smokeless tobacco: Never   Vaping Use    Vaping status: Never Used   Substance Use Topics    Alcohol use: Never    Drug use: Not Currently     No current facility-administered medications on file prior to encounter.     Current Outpatient Medications on File Prior to Encounter   Medication Sig Dispense Refill    aspirin 81 MG EC tablet Take 1 tablet by mouth Daily.      carbidopa-levodopa (SINEMET)  MG per tablet Take 1.5 tablets by mouth 4 (Four) Times a Day.      carbidopa-levodopa CR (SINEMET CR)  MG per CR tablet Take 1 tablet by mouth Every Night. Give at 2300      dicyclomine (BENTYL) 20 MG tablet Take 1 tablet by mouth Every 6 (Six) Hours. 20 tablet 0    gabapentin (NEURONTIN) 300 MG capsule Take 1 capsule by mouth 2 (Two) Times a Day.      insulin glargine (LANTUS, SEMGLEE) 100 UNIT/ML injection Inject 40 Units under the skin into the appropriate area as directed Every Night.      Insulin Lispro (humaLOG) 100 UNIT/ML injection Inject 2-10 Units under the skin into the appropriate area as directed 3 (Three) Times a Day Before Meals. -199=2 unit,200-249=4 unit,250-299=6 unit,300-349=8 unit,350-400=10 unit,>400= call MD      miconazole (MICOTIN) 2 % cream Apply 1 Application topically to the appropriate area as directed 2 (Two) Times a Day. Clean scrotum with soap and water and apply miconazole to area r/t yeast      mirtazapine (REMERON) 30 MG tablet Take 1.5 tablets by mouth  Every Night.      oxyCODONE-acetaminophen (Percocet) 5-325 MG per tablet Take 1 tablet by mouth Every 4 (Four) Hours As Needed for Severe Pain. 20 tablet 0    sennosides-docusate (senna-docusate sodium) 8.6-50 MG per tablet Take 1 tablet by mouth 2 (Two) Times a Day for 30 days. 60 tablet 0    sertraline (ZOLOFT) 25 MG tablet Take 3 tablets by mouth Daily.      vitamin B-12 (CYANOCOBALAMIN) 1000 MCG tablet Take 1 tablet by mouth Daily.      vitamin D (ERGOCALCIFEROL) 1.25 MG (54081 UT) capsule capsule Take 1 capsule by mouth 1 (One) Time Per Week. Wednesdays      bisacodyl (Dulcolax) 10 MG suppository Insert 1 suppository into the rectum Daily As Needed for Constipation. 28 suppository 0    cyclobenzaprine (FLEXERIL) 10 MG tablet Take 1 tablet by mouth 3 (Three) Times a Day As Needed for Muscle Spasms. 30 tablet 0    dextrose (GLUTOSE) 40 % gel Take 15 g by mouth Every 1 (One) Hour As Needed for Low Blood Sugar.      glucagon, human recombinant, (GLUCAGEN DIAGNOSTIC) 1 MG injection Infuse 1 mg into a venous catheter 1 (One) Time.      polyethylene glycol (MIRALAX) 17 g packet Take 17 g by mouth 2 (Two) Times a Day. 60 packet 0     No Known Allergies    Objective    Objective     Vital Signs  Temp:  [98.2 °F (36.8 °C)-99.9 °F (37.7 °C)] 98.2 °F (36.8 °C)  Heart Rate:  [] 88  Resp:  [18-20] 20  BP: (112-148)/(34-93) 128/76  SpO2:  [93 %-100 %] 100 %  on  Flow (L/min) (Oxygen Therapy):  [2] 2;   Device (Oxygen Therapy): room air  Body mass index is 20.23 kg/m².    Physical Exam  Vitals and nursing note reviewed.   Constitutional:       General: He is sleeping.      Appearance: He is ill-appearing.   HENT:      Head: Normocephalic and atraumatic.      Mouth/Throat:      Mouth: Mucous membranes are dry.   Eyes:      General: No scleral icterus.  Cardiovascular:      Rate and Rhythm: Normal rate and regular rhythm.   Pulmonary:      Effort: Pulmonary effort is normal.      Breath sounds: Normal breath sounds.    Abdominal:      General: Bowel sounds are normal. There is no distension.      Palpations: Abdomen is soft.      Tenderness: There is no abdominal tenderness.   Skin:     General: Skin is warm and dry.   Neurological:      Mental Status: He is lethargic.   Psychiatric:         Attention and Perception: He is inattentive.         Cognition and Memory: Cognition is impaired. Memory is impaired.         Results Review:  I reviewed the patient's new clinical results.  I reviewed the patient's new imaging results and agree with the interpretation.  I reviewed the patient's other test results and agree with the interpretation  I personally viewed and interpreted the patient's EKG/Telemetry data  Discussed with ED provider.    Lab Results (last 24 hours)       Procedure Component Value Units Date/Time    POC Glucose Once [[id number]]  (Normal) Collected: 03/22/25 0037    Specimen: Blood Updated: 03/22/25 0039     Glucose 112 mg/dL     CBC & Differential [[id number]]  (Abnormal) Collected: 03/22/25 0042    Specimen: Blood Updated: 03/22/25 0057    Narrative:      The following orders were created for panel order CBC & Differential.  Procedure                               Abnormality         Status                     ---------                               -----------         ------                     CBC Auto Differential[[id number]]        Abnormal            Final result                 Please view results for these tests on the individual orders.    Comprehensive Metabolic Panel [[id number]]  (Abnormal) Collected: 03/22/25 0042    Specimen: Blood Updated: 03/22/25 0137     Glucose 108 mg/dL      BUN 40 mg/dL      Creatinine 1.51 mg/dL      Sodium 141 mmol/L      Potassium 4.7 mmol/L      Chloride 105 mmol/L      CO2 24.6 mmol/L      Calcium 9.0 mg/dL      Total Protein 7.0 g/dL      Albumin 3.8 g/dL      ALT (SGPT) 5 U/L      AST (SGOT) 26 U/L      Alkaline Phosphatase 132 U/L      Total Bilirubin 0.4 mg/dL       Globulin 3.2 gm/dL      A/G Ratio 1.2 g/dL      BUN/Creatinine Ratio 26.5     Anion Gap 11.4 mmol/L      eGFR 48.8 mL/min/1.73     Narrative:      GFR Categories in Chronic Kidney Disease (CKD)      GFR Category          GFR (mL/min/1.73)    Interpretation  G1                     90 or greater         Normal or high (1)  G2                      60-89                Mild decrease (1)  G3a                   45-59                Mild to moderate decrease  G3b                   30-44                Moderate to severe decrease  G4                    15-29                Severe decrease  G5                    14 or less           Kidney failure          (1)In the absence of evidence of kidney disease, neither GFR category G1 or G2 fulfill the criteria for CKD.    eGFR calculation 2021 CKD-EPI creatinine equation, which does not include race as a factor    CK [[id number]]  (Abnormal) Collected: 03/22/25 0042    Specimen: Blood Updated: 03/22/25 0117     Creatine Kinase 754 U/L     Magnesium [[id number]]  (Normal) Collected: 03/22/25 0042    Specimen: Blood Updated: 03/22/25 0117     Magnesium 2.2 mg/dL     Ammonia [[id number]]  (Normal) Collected: 03/22/25 0042    Specimen: Blood Updated: 03/22/25 0110     Ammonia 16 umol/L     CBC Auto Differential [[id number]]  (Abnormal) Collected: 03/22/25 0042    Specimen: Blood Updated: 03/22/25 0057     WBC 13.34 10*3/mm3      RBC 4.72 10*6/mm3      Hemoglobin 13.9 g/dL      Hematocrit 43.4 %      MCV 91.9 fL      MCH 29.4 pg      MCHC 32.0 g/dL      RDW 13.4 %      RDW-SD 45.4 fl      MPV 9.6 fL      Platelets 293 10*3/mm3      Neutrophil % 71.9 %      Lymphocyte % 12.0 %      Monocyte % 15.2 %      Eosinophil % 0.2 %      Basophil % 0.3 %      Immature Grans % 0.4 %      Neutrophils, Absolute 9.59 10*3/mm3      Lymphocytes, Absolute 1.60 10*3/mm3      Monocytes, Absolute 2.03 10*3/mm3      Eosinophils, Absolute 0.03 10*3/mm3      Basophils, Absolute 0.04 10*3/mm3      Immature Grans,  Absolute 0.05 10*3/mm3      nRBC 0.0 /100 WBC     Urinalysis With Culture If Indicated - Urine, Catheter [[id number]]  (Abnormal) Collected: 03/22/25 0109    Specimen: Urine, Catheter Updated: 03/22/25 0153     Color, UA Yellow     Appearance, UA Clear     pH, UA <=5.0     Specific Gravity, UA 1.028     Glucose, UA Negative     Ketones, UA 15 mg/dL (1+)     Bilirubin, UA Negative     Blood, UA Negative     Protein, UA Trace     Leuk Esterase, UA Negative     Nitrite, UA Negative     Urobilinogen, UA 1.0 E.U./dL    Narrative:      In absence of clinical symptoms, the presence of pyuria, bacteria, and/or nitrites on the urinalysis result does not correlate with infection.  Urine microscopic not indicated.    Respiratory Panel PCR w/COVID-19(SARS-CoV-2) [facility]ONNA/FRAN[facility] In-House, NP Swab in UTM/VTM, 2 HR TAT - Swab, Nasopharynx [[id number]]  (Normal) Collected: 03/22/25 0444    Specimen: Swab from Nasopharynx Updated: 03/22/25 0622     ADENOVIRUS, PCR Not Detected     Coronavirus 229E Not Detected     Coronavirus HKU1 Not Detected     Coronavirus NL63 Not Detected     Coronavirus OC43 Not Detected     COVID19 Not Detected     Human Metapneumovirus Not Detected     Human Rhinovirus/Enterovirus Not Detected     Influenza A PCR Not Detected     Influenza B PCR Not Detected     Parainfluenza Virus 1 Not Detected     Parainfluenza Virus 2 Not Detected     Parainfluenza Virus 3 Not Detected     Parainfluenza Virus 4 Not Detected     RSV, PCR Not Detected     Bordetella pertussis pcr Not Detected     Bordetella parapertussis PCR Not Detected     Chlamydophila pneumoniae PCR Not Detected     Mycoplasma pneumo by PCR Not Detected    Narrative:      In the setting of a positive respiratory panel with a viral infection PLUS a negative procalcitonin without other underlying concern for bacterial infection, consider observing off antibiotics or discontinuation of antibiotics and continue supportive care. If the respiratory  panel is positive for atypical bacterial infection (Bordetella pertussis, Chlamydophila pneumoniae, or Mycoplasma pneumoniae), consider antibiotic de-escalation to target atypical bacterial infection.    Basic Metabolic Panel [[id number]]  (Abnormal) Collected: 03/22/25 0650    Specimen: Blood Updated: 03/22/25 0757     Glucose 85 mg/dL      BUN 36 mg/dL      Creatinine 1.42 mg/dL      Sodium 139 mmol/L      Potassium 4.7 mmol/L      Comment: Slight hemolysis detected by analyzer. Result may be falsely elevated.        Chloride 107 mmol/L      CO2 20.6 mmol/L      Calcium 9.1 mg/dL      BUN/Creatinine Ratio 25.4     Anion Gap 11.4 mmol/L      eGFR 52.5 mL/min/1.73     Narrative:      GFR Categories in Chronic Kidney Disease (CKD)      GFR Category          GFR (mL/min/1.73)    Interpretation  G1                     90 or greater         Normal or high (1)  G2                      60-89                Mild decrease (1)  G3a                   45-59                Mild to moderate decrease  G3b                   30-44                Moderate to severe decrease  G4                    15-29                Severe decrease  G5                    14 or less           Kidney failure          (1)In the absence of evidence of kidney disease, neither GFR category G1 or G2 fulfill the criteria for CKD.    eGFR calculation 2021 CKD-EPI creatinine equation, which does not include race as a factor    CBC (No Diff) [[id number]]  (Normal) Collected: 03/22/25 0650    Specimen: Blood Updated: 03/22/25 0738     WBC 9.19 10*3/mm3      RBC 4.59 10*6/mm3      Hemoglobin 14.0 g/dL      Hematocrit 40.6 %      MCV 88.5 fL      MCH 30.5 pg      MCHC 34.5 g/dL      RDW 13.5 %      RDW-SD 43.4 fl      MPV 9.5 fL      Platelets 244 10*3/mm3     CK [[id number]]  (Abnormal) Collected: 03/22/25 0650    Specimen: Blood Updated: 03/22/25 0757     Creatine Kinase 548 U/L     POC Glucose Once [[id number]]  (Normal) Collected: 03/22/25 0737    Specimen:  Blood Updated: 03/22/25 0738     Glucose 112 mg/dL     POC Glucose Once [[id number]]  (Normal) Collected: 03/22/25 1146    Specimen: Blood Updated: 03/22/25 1151     Glucose 99 mg/dL             Imaging Results (Last 24 Hours)       Procedure Component Value Units Date/Time    CT Head Without Contrast [[id number]] Collected: 03/22/25 0519     Updated: 03/22/25 0519    Narrative:        Patient: DIMITRI WEBBER  Time Out: 05:18  Exam(s): CT HEAD Without Contrast     EXAM:    CT Head Without Intravenous Contrast    CLINICAL HISTORY:     Reason for exam: AMS.    TECHNIQUE:    Axial computed tomography images of the head brain without intravenous   contrast.  CTDI is 55.96 mGy and DLP is 1040.4 mGy-cm.  This CT exam was   performed according to the principle of ALARA (As Low As Reasonably   Achievable) by using one or more of the following dose reduction   techniques: automated exposure control, adjustment of the mA and or kV   according to patient size, and or use of iterative reconstruction   technique.    COMPARISON:    No relevant prior studies available.    FINDINGS:    Brain:  No acute intracranial abnormality.  Consider MRI if there is   further concern.  Areas of decreased attenuation in the deep cerebral   white matter are consistent with small vessel ischemic degenerative   changes.  The cerebral and cerebellar sulci are prominent consistent with   brain atrophy.  No hemorrhage.    Ventricles:  Unremarkable.  No ventriculomegaly.    Bones joints:  Unremarkable.  No acute fracture.    Soft tissues:  Unremarkable.    Vasculature:  Atherosclerotic disease.    Sinuses:  Unremarkable as visualized.    Mastoid air cells:  Unremarkable as visualized.  No mastoid effusion.    Orbits:  Bilateral lens replacements.    IMPRESSION:       1.  No acute intracranial abnormality.  Consider MRI if there is further   concern.  2.  Small vessel ischemic degenerative changes.  3.  Cerebral and cerebellar atrophy.      Impression:    "       Electronically signed by Tawanda Kinney MD on 03-22-25 at 0518    XR Chest 1 View [[id number]] Collected: 03/22/25 0104     Updated: 03/22/25 0108    Narrative:      CXR ONE VIEW      HISTORY: AMS Protocol     COMPARISON: 10/11/2024     TECHNIQUE: single portable AP       Impression:         Allowing for submaximal inspiratory result, heart size appears within  normal limits.     There is a submaximal inspiratory result. Allowing for that, there is no  convincing evidence of infiltrate, effusion or pneumothorax.           This report was finalized on 3/22/2025 1:05 AM by Dr. Haider Carlson M.D on Workstation: [id number]                 ECG 12 Lead Altered Mental Status   Preliminary Result   HEART SEQF=039  bpm   RR Sigryuiw=747  ms   NM Okohojvl=489  ms   P Horizontal Axis=28  deg   P Front Axis=2  deg   QRSD Ptoxtoun=121  ms   QT Cpcbidfe=856  ms   ZJzT=781  ms   QRS Axis=-28  deg   T Wave Axis=21  deg   - ABNORMAL ECG -   Sinus tachycardia   Ventricular premature complex   Prolonged NM interval   Inferior infarct, old   Date and Time of Study:2025-03-22 00:56:08      Telemetry Scan   Final Result        Assessment/Plan   Assessment & Plan   Active Hospital Problems    Diagnosis  POA    **Altered mental status [R41.82]  Yes    Essential (primary) hypertension [I10]  Unknown    Type 2 diabetes mellitus with hyperglycemia, with long-term current use of insulin [E11.65, Z79.4]  Not Applicable    Parkinson disease [G20.A1]  Yes    Dementia [F03.90]  Yes    Chronic indwelling Holland catheter [Z97.8]  Not Applicable      Resolved Hospital Problems   No resolved problems to display.       74 y.o. male admitted with Altered mental status.    Patient's only complaint is of feeling \"sleepy.\"  Not really answering my questions.  Discussed with nurse who states he was alert enough to eat breakfast this morning and has been able to answer some simple questions.    The etiology for his lethargy unclear.  Recently " treated for UTI with Bactrim though per discharge summary no urinary or infectious symptoms and patient noted to have chronic indwelling Holland catheter.  Suspect  mild elevation in creatinine secondary to Bactrim use.  He currently has no infectious symptoms and urinalysis not consistent with UTI.    Will resume his Parkinson's meds.  Discontinue Percocet and give gentle hydration.  Hold Bactrim.  Otherwise seems stable.  If no improvement in mental status by tomorrow would consult neurology.      SCDs for DVT prophylaxis.  Full code.  Discussed with patient, nursing staff, and ED provider.      Angel Foley MD  [facility]  03/22/25  15:31 EDT

## 2025-03-22 NOTE — PLAN OF CARE
VSS this shift. Patient slept most of day. He woke up to eat Breakfast and Dinner but wanted to sleep instead of eat lunch. Patient AOx2. Q4 neuro checks.Bed alarm on and call light in reach.

## 2025-03-22 NOTE — ED PROVIDER NOTES
EMERGENCY DEPARTMENT ENCOUNTER  Room Number:  22/22  PCP: Salomon Mcknight MD  Independent Historians: EMS      HPI:  Chief Complaint: had concerns including Altered Mental Status.     A complete HPI/ROS/PMH/PSH/SH/FH are unobtainable due to: Altered Mental Status    Chronic or social conditions impacting patient care (Social Determinants of Health): Community and Social Context (Social Integration, Support Systems, Community Engagement, Intimate Partner Violence, Discrimination, Stress)      Context: Singh Hatch is a 74 y.o. male with a medical history of Parkinson's disease, diabetes, hyperlipidemia, CAD who presents to the ED c/o acute AMS.  All the history is provided by EMS as the patient cannot currently contribute with details of history.  They say the nursing home told them that he has had a decline over the past 3 days.  The nurse who came on at 6 noticed and progressively worsening, which prompted her to call EMS.  They did not report any fever, vomiting, shortness of breath, or other associated symptoms.      Review of prior external notes (non-ED) -and- Review of prior external test results outside of this encounter:  I reviewed the discharge summary from hospitalization 3/12/2025 for patient was evaluated for dementia, Parkinson's disease, abdominal pain, type 2 diabetes and constipation.    Prescription drug monitoring program review:     N/A    PAST MEDICAL HISTORY  Active Ambulatory Problems     Diagnosis Date Noted    Chronic indwelling Holland catheter 10/12/2024    Dementia 10/12/2024    Parkinson disease 10/12/2024    Type 2 diabetes mellitus with hyperglycemia, with long-term current use of insulin 10/13/2024    Acute metabolic encephalopathy 10/13/2024    Intractable abdominal pain 03/12/2025    Constipation 03/12/2025     Resolved Ambulatory Problems     Diagnosis Date Noted    Urinary tract infection in male 10/12/2024     Past Medical History:   Diagnosis Date    Age-related osteoporosis  without current pathological fracture     Atherosclerotic heart disease of native coronary artery without angina pectoris     Essential (primary) hypertension     Irritant contact dermatitis due to fecal, urinary or dual incontinence     Long term (current) use of insulin     Mixed hyperlipidemia     Other cervical disc degeneration, unspecified cervical region     Other intervertebral disc degeneration, 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     Other obstructive and reflux uropathy     Other specified anxiety disorders     Parkinson's disease without dyskinesia, without mention of fluctuations     Personal history of malignant neoplasm of prostate     Type 2 diabetes mellitus with hyperglycemia     Unspecified dementia, unspecified severity, without behavioral disturbance, psychotic disturbance, mood disturbance, and anxiety     Vitamin D deficiency, unspecified          PAST SURGICAL HISTORY  No past surgical history on file.      FAMILY HISTORY  No family history on file.      SOCIAL HISTORY  Social History     Socioeconomic History    Marital status:    Tobacco Use    Smoking status: Never     Passive exposure: Never    Smokeless tobacco: Never   Vaping Use    Vaping status: Never Used   Substance and Sexual Activity    Alcohol use: Never    Drug use: Not Currently    Sexual activity: Never     Birth control/protection: Abstinence         ALLERGIES  Patient has no known allergies.        PHYSICAL EXAM    I have reviewed the triage vital signs and nursing notes.    ED Triage Vitals   Temp Heart Rate Resp BP SpO2   03/22/25 0039 03/22/25 0037 03/22/25 0037 03/22/25 0037 03/22/25 0037   99.9 °F (37.7 °C) 100 18 129/84 99 %      Temp src Heart Rate Source Patient Position BP Location FiO2 (%)   03/22/25 0039 -- -- -- --   Rectal           Physical Exam  Constitutional:       Appearance: He is ill-appearing.   HENT:      Head: Normocephalic and atraumatic.   Eyes:      Pupils: Pupils are equal, round,  and reactive to light.   Cardiovascular:      Rate and Rhythm: Regular rhythm. Tachycardia present.      Heart sounds: Murmur heard.   Pulmonary:      Effort: Pulmonary effort is normal. No respiratory distress.      Breath sounds: Normal breath sounds.   Abdominal:      General: There is no distension.      Tenderness: There is no abdominal tenderness. There is no guarding.   Neurological:      Mental Status: He is lethargic, disoriented and confused.      GCS: GCS eye subscore is 3. GCS verbal subscore is 3. GCS motor subscore is 6.      Comments: Patient does follow commands and moves all extremities, no obvious facial asymmetry         My independent rotation of the EKG: Sinus tachycardia, PVC, first-degree AV block    LAB RESULTS  Recent Results (from the past 24 hours)   POC Glucose Once    Collection Time: 03/22/25 12:37 AM    Specimen: Blood   Result Value Ref Range    Glucose 112 70 - 130 mg/dL   Comprehensive Metabolic Panel    Collection Time: 03/22/25 12:42 AM    Specimen: Blood   Result Value Ref Range    Glucose 108 (H) 65 - 99 mg/dL    BUN 40 (H) 8 - 23 mg/dL    Creatinine 1.51 (H) 0.76 - 1.27 mg/dL    Sodium 141 136 - 145 mmol/L    Potassium 4.7 3.5 - 5.2 mmol/L    Chloride 105 98 - 107 mmol/L    CO2 24.6 22.0 - 29.0 mmol/L    Calcium 9.0 8.6 - 10.5 mg/dL    Total Protein 7.0 6.0 - 8.5 g/dL    Albumin 3.8 3.5 - 5.2 g/dL    ALT (SGPT) 5 1 - 41 U/L    AST (SGOT) 26 1 - 40 U/L    Alkaline Phosphatase 132 (H) 39 - 117 U/L    Total Bilirubin 0.4 0.0 - 1.2 mg/dL    Globulin 3.2 gm/dL    A/G Ratio 1.2 g/dL    BUN/Creatinine Ratio 26.5 (H) 7.0 - 25.0    Anion Gap 11.4 5.0 - 15.0 mmol/L    eGFR 48.8 (L) >60.0 mL/min/1.73   CK    Collection Time: 03/22/25 12:42 AM    Specimen: Blood   Result Value Ref Range    Creatine Kinase 754 (H) 20 - 200 U/L   Magnesium    Collection Time: 03/22/25 12:42 AM    Specimen: Blood   Result Value Ref Range    Magnesium 2.2 1.6 - 2.4 mg/dL   Ammonia    Collection Time: 03/22/25  12:42 AM    Specimen: Blood   Result Value Ref Range    Ammonia 16 16 - 60 umol/L   Green Top (Gel)    Collection Time: 03/22/25 12:42 AM   Result Value Ref Range    Extra Tube Hold for add-ons.    Lavender Top    Collection Time: 03/22/25 12:42 AM   Result Value Ref Range    Extra Tube hold for add-on    Gold Top - SST    Collection Time: 03/22/25 12:42 AM   Result Value Ref Range    Extra Tube Hold for add-ons.    Light Blue Top    Collection Time: 03/22/25 12:42 AM   Result Value Ref Range    Extra Tube Hold for add-ons.    CBC Auto Differential    Collection Time: 03/22/25 12:42 AM    Specimen: Blood   Result Value Ref Range    WBC 13.34 (H) 3.40 - 10.80 10*3/mm3    RBC 4.72 4.14 - 5.80 10*6/mm3    Hemoglobin 13.9 13.0 - 17.7 g/dL    Hematocrit 43.4 37.5 - 51.0 %    MCV 91.9 79.0 - 97.0 fL    MCH 29.4 26.6 - 33.0 pg    MCHC 32.0 31.5 - 35.7 g/dL    RDW 13.4 12.3 - 15.4 %    RDW-SD 45.4 37.0 - 54.0 fl    MPV 9.6 6.0 - 12.0 fL    Platelets 293 140 - 450 10*3/mm3    Neutrophil % 71.9 42.7 - 76.0 %    Lymphocyte % 12.0 (L) 19.6 - 45.3 %    Monocyte % 15.2 (H) 5.0 - 12.0 %    Eosinophil % 0.2 (L) 0.3 - 6.2 %    Basophil % 0.3 0.0 - 1.5 %    Immature Grans % 0.4 0.0 - 0.5 %    Neutrophils, Absolute 9.59 (H) 1.70 - 7.00 10*3/mm3    Lymphocytes, Absolute 1.60 0.70 - 3.10 10*3/mm3    Monocytes, Absolute 2.03 (H) 0.10 - 0.90 10*3/mm3    Eosinophils, Absolute 0.03 0.00 - 0.40 10*3/mm3    Basophils, Absolute 0.04 0.00 - 0.20 10*3/mm3    Immature Grans, Absolute 0.05 0.00 - 0.05 10*3/mm3    nRBC 0.0 0.0 - 0.2 /100 WBC   ECG 12 Lead Altered Mental Status    Collection Time: 03/22/25 12:56 AM   Result Value Ref Range    QT Interval 336 ms    QTC Interval 443 ms   Urinalysis With Culture If Indicated - Urine, Catheter    Collection Time: 03/22/25  1:09 AM    Specimen: Urine, Catheter   Result Value Ref Range    Color, UA Yellow Yellow, Straw    Appearance, UA Clear Clear    pH, UA <=5.0 5.0 - 8.0    Specific [place], UA 1.028  1.005 - 1.030    Glucose, UA Negative Negative    Ketones, UA 15 mg/dL (1+) (A) Negative    Bilirubin, UA Negative Negative    Blood, UA Negative Negative    Protein, UA Trace (A) Negative    Leuk Esterase, UA Negative Negative    Nitrite, UA Negative Negative    Urobilinogen, UA 1.0 E.U./dL 0.2 - 1.0 E.U./dL         RADIOLOGY  XR Chest 1 View  Result Date: 3/22/2025  CXR ONE VIEW  HISTORY: AMS Protocol  COMPARISON: 10/11/2024  TECHNIQUE: single portable AP       Allowing for submaximal inspiratory result, heart size appears within normal limits.  There is a submaximal inspiratory result. Allowing for that, there is no convincing evidence of infiltrate, effusion or pneumothorax.    This report was finalized on 3/22/2025 1:05 AM by Dr. Haider Carlson M.D on Workstation: [id number]    My independent rotation of the chest x-ray: Low lung volumes      MEDICATIONS GIVEN IN ER  Medications   sodium chloride 0.9 % flush 10 mL (has no administration in time range)   lactated ringers bolus 1,000 mL (has no administration in time range)         ORDERS PLACED DURING THIS VISIT:  Orders Placed This Encounter   Procedures    XR Chest 1 View    CT Head Without Contrast    [place] Draw    Comprehensive Metabolic Panel    Urinalysis With Culture If Indicated - Urine, Catheter    CK    Magnesium    Ammonia    CBC Auto Differential    Continuous Pulse Oximetry    Vital Signs    Oxygen Therapy- Nasal Cannula; Titrate 1-6 LPM Per SpO2; 90 - 95%    POC Glucose Once    POC Glucose Once    ECG 12 Lead Altered Mental Status    Insert Peripheral IV    Fall Precautions    CBC & Differential    Green Top (Gel)    Lavender Top    Gold Top - SST    Light Blue Top         OUTPATIENT MEDICATION MANAGEMENT:  Current Facility-Administered Medications Ordered in Epic   Medication Dose Route Frequency Provider Last Rate Last Admin    lactated ringers bolus 1,000 mL  1,000 mL Intravenous Once Rupinder Ellis MD        sodium chloride 0.9 % flush  10 mL  10 mL Intravenous Rupinder Roman MD         Current Outpatient Medications Ordered in Epic   Medication Sig Dispense Refill    aspirin 81 MG EC tablet Take 1 tablet by mouth Daily.      bisacodyl (Dulcolax) 10 MG suppository Insert 1 suppository into the rectum Daily As Needed for Constipation. 28 suppository 0    carbidopa-levodopa (SINEMET)  MG per tablet Take 1.5 tablets by mouth 4 (Four) Times a Day.      carbidopa-levodopa CR (SINEMET CR)  MG per CR tablet Take 1 tablet by mouth Every Night. Give at 2300      cyclobenzaprine (FLEXERIL) 10 MG tablet Take 1 tablet by mouth 3 (Three) Times a Day As Needed for Muscle Spasms. 30 tablet 0    dextrose (GLUTOSE) 40 % gel Take 15 g by mouth Every 1 (One) Hour As Needed for Low Blood Sugar.      dicyclomine (BENTYL) 20 MG tablet Take 1 tablet by mouth Every 6 (Six) Hours. 20 tablet 0    gabapentin (NEURONTIN) 300 MG capsule Take 1 capsule by mouth 2 (Two) Times a Day.      glucagon, human recombinant, (GLUCAGEN DIAGNOSTIC) 1 MG injection Infuse 1 mg into a venous catheter 1 (One) Time.      insulin glargine (LANTUS, SEMGLEE) 100 UNIT/ML injection Inject 40 Units under the skin into the appropriate area as directed Every Night.      Insulin Lispro (humaLOG) 100 UNIT/ML injection Inject 2-10 Units under the skin into the appropriate area as directed 3 (Three) Times a Day Before Meals. -199=2 unit,200-249=4 unit,250-299=6 unit,300-349=8 unit,350-400=10 unit,>400= call MD      miconazole (MICOTIN) 2 % cream Apply 1 Application topically to the appropriate area as directed 2 (Two) Times a Day. Clean scrotum with soap and water and apply miconazole to area r/t yeast      mirtazapine (REMERON) 30 MG tablet Take 1.5 tablets by mouth Every Night.      oxyCODONE-acetaminophen (Percocet) 5-325 MG per tablet Take 1 tablet by mouth Every 4 (Four) Hours As Needed for Severe Pain. 20 tablet 0    polyethylene glycol (MIRALAX) 17 g packet Take 17 g by  mouth 2 (Two) Times a Day. 60 packet 0    sennosides-docusate (senna-docusate sodium) 8.6-50 MG per tablet Take 1 tablet by mouth 2 (Two) Times a Day for 30 days. 60 tablet 0    sertraline (ZOLOFT) 25 MG tablet Take 3 tablets by mouth Daily.      vitamin B-12 (CYANOCOBALAMIN) 1000 MCG tablet Take 1 tablet by mouth Daily.      vitamin D (ERGOCALCIFEROL) 1.25 MG (64022 UT) capsule capsule Take 1 capsule by mouth 1 (One) Time Per Week. Wednesdays               PROGRESS, DATA ANALYSIS, CONSULTS, AND MEDICAL DECISION MAKING  All labs have been independently interpreted by me.  All radiology studies have been reviewed by me. All EKG's have been independently viewed and interpreted by me.  Discussion below represents my analysis of pertinent findings related to patient's condition, differential diagnosis, treatment plan and final disposition.    Differential diagnosis includes but is not limited to polypharmacy, electrolyte disturbance, arrhythmia, ICH, DKA, dementia.    Clinical Scores:                                 This is a chronically ill-appearing 74-year-old male who is presenting today secondary to altered mental status.  At the time of my evaluation he presents with mild tachycardia, but other vital signs are fairly benign.  He does not appear to be in any distress.  He is able to follow commands, does vocalize, but no meaningful words.  He was last his normal self at least 3 days ago.    He was evaluated with an EKG, which did not demonstrate any acute arrhythmia as a potential etiology of symptoms.  There is no evidence of acute ischemia.    He was evaluated with labs, which notes an acute kidney injury and mild elevation in CK.  He has a nonspecific leukocytosis, but no current evidence of infection.  UA is not suggestive of UTI.    He was evaluated with a chest x-ray, which did not demonstrate any acute intrathoracic findings.    CT of the head did not demonstrate any other acute intracranial cause of  symptoms.    Given the degree of altered mental status, patient will require admission to the hospital for further care and management.    ED Course as of 03/23/25 0012   Sat Mar 22, 2025   0230 Pt care assumed from  Dr. Ellis, pending CT scan and admission. [MP]   0320 CT scan of head independently interpreted by me as no hemorrhage [MP]   0504 I spoke with Tameka with A.  Discussed patient presentation and ED findings.  She agrees admit patient to a telemetry observation bed. [MP]      ED Course User Index  [MP] Leyla Aviles PA-C             AS OF 02:20 EDT VITALS:    BP - 129/84  HR - 100  TEMP - 99.9 °F (37.7 °C) (Rectal)  O2 SATS - 99%    COMPLEXITY OF CARE  The patient requires admission.      DIAGNOSIS  Final diagnoses:   Altered mental status, unspecified altered mental status type   Non-traumatic rhabdomyolysis   SHALOM (acute kidney injury)   Polypharmacy         DISPOSITION  ED Disposition       ED Disposition   Decision to Admit    Condition   --    Comment   Level of Care: Telemetry [5]   Diagnosis: Altered mental status [780.97.ICD-9-CM]   Admitting Physician: SAM KARIMI [[id number]]   Attending Physician: SAM KARIMI [[id number]]   Is patient appropriate for Inpatient Observation Unit?: No [0]                  Please note that portions of this document were completed with a voice recognition program.    Note Disclaimer: At [facility], we believe that sharing information builds trust and better relationships. You are receiving this note because you recently visited [facility]. It is possible you will see health information before a provider has talked with you about it. This kind of information can be easy to misunderstand. To help you fully understand what it means for your health, we urge you to discuss this note with your provider.         Rupinder Ellis MD  03/23/25 0012

## 2025-03-22 NOTE — ED NOTES
Nursing report ED to floor  Singh Hatch  74 y.o.  male    Singh Hatch is a 74 y.o. male with a medical history of Parkinson's disease, diabetes, hyperlipidemia, CAD who presents to the ED c/o acute AMS.  All the history is provided by EMS as the patient cannot currently contribute with details of history.  They say the nursing home told them that he has had a decline over the past 3 days.  The nurse who came on at 6 noticed and progressively worsening, which prompted her to call EMS.  They did not report any fever, vomiting, shortness of breath, or other associated symptoms.     HPI :  HPI  Stated Reason for Visit: To ER via EMS from [facility].  EMS was called for AMS and hypoxia O2 Sat 90%.  At approx 1800 pt was slightly altered, but still talking.  Pt stopped talking so EMS was called.  Facility stated pt has been declining for the last 3 days.      Accu check 112    Pt has hx of Parkinson's.  History Obtained From: EMS    Chief Complaint  Chief Complaint   Patient presents with    Altered Mental Status       Admitting doctor:   Jaime Cronin MD    Admitting diagnosis:   The primary encounter diagnosis was Altered mental status, unspecified altered mental status type. Diagnoses of Non-traumatic rhabdomyolysis, SHALOM (acute kidney injury), and Polypharmacy were also pertinent to this visit.    Code status:   Current Code Status       Date Active Code Status Order ID Comments User Context       3/22/2025 0507 CPR (Attempt to Resuscitate) [id number]  Tameka Valdez APRN ED        Question Answer    Code Status (Patient has no pulse and is not breathing) CPR (Attempt to Resuscitate)    Medical Interventions (Patient has pulse or is breathing) Full Support                    Allergies:   Patient has no known allergies.    Isolation:   Contact    Intake and Output    Intake/Output Summary (Last 24 hours) at 3/22/2025 0536  Last data filed at 3/22/2025 0351  Gross per 24 hour   Intake 1000 ml    Output --   Net 1000 ml       Weight:       03/22/25  0039   Weight: 65.8 kg (145 lb 1 oz)       Most recent vitals:   Vitals:    03/22/25 0327 03/22/25 0336 03/22/25 0406 03/22/25 0436   BP:  138/78 129/71 126/72   Pulse: 88 89 86 89   Resp: 18      Temp:       TempSrc:       SpO2: 99% 99% 99% 98%   Weight:       Height:           Active LDAs/IV Access:   Lines, Drains & Airways       Active LDAs       Name Placement date Placement time Site Days    Peripheral IV 03/22/25 0037 Left Antecubital 03/22/25 0037  Antecubital  less than 1                    Labs (abnormal labs have a star):   Labs Reviewed   COMPREHENSIVE METABOLIC PANEL - Abnormal; Notable for the following components:       Result Value    Glucose 108 (*)     BUN 40 (*)     Creatinine 1.51 (*)     Alkaline Phosphatase 132 (*)     BUN/Creatinine Ratio 26.5 (*)     eGFR 48.8 (*)     All other components within normal limits    Narrative:     GFR Categories in Chronic Kidney Disease (CKD)      GFR Category          GFR (mL/min/1.73)    Interpretation  G1                     90 or greater         Normal or high (1)  G2                      60-89                Mild decrease (1)  G3a                   45-59                Mild to moderate decrease  G3b                   30-44                Moderate to severe decrease  G4                    15-29                Severe decrease  G5                    14 or less           Kidney failure          (1)In the absence of evidence of kidney disease, neither GFR category G1 or G2 fulfill the criteria for CKD.    eGFR calculation 2021 CKD-EPI creatinine equation, which does not include race as a factor   URINALYSIS W/ CULTURE IF INDICATED - Abnormal; Notable for the following components:    Ketones, UA 15 mg/dL (1+) (*)     Protein, UA Trace (*)     All other components within normal limits    Narrative:     In absence of clinical symptoms, the presence of pyuria, bacteria, and/or nitrites on the urinalysis result  does not correlate with infection.  Urine microscopic not indicated.   CK - Abnormal; Notable for the following components:    Creatine Kinase 754 (*)     All other components within normal limits   CBC WITH AUTO DIFFERENTIAL - Abnormal; Notable for the following components:    WBC 13.34 (*)     Lymphocyte % 12.0 (*)     Monocyte % 15.2 (*)     Eosinophil % 0.2 (*)     Neutrophils, Absolute 9.59 (*)     Monocytes, Absolute 2.03 (*)     All other components within normal limits   MAGNESIUM - Normal   AMMONIA - Normal   POCT GLUCOSE FINGERSTICK - Normal   RESPIRATORY PANEL PCR W/ COVID-19 (SARS-COV-2), NP SWAB IN UTM/VTP, 2 HR TAT   RAINBOW DRAW    Narrative:     The following orders were created for panel order [place] Draw.  Procedure                               Abnormality         Status                     ---------                               -----------         ------                     Green Top (Gel)[[id number]]                                  Final result               Lavender Top[[id number]]                                     Final result               Gold Top - SST[[id number]]                                   Final result               Light Blue Top[[id number]]                                   Final result                 Please view results for these tests on the individual orders.   BASIC METABOLIC PANEL   CBC (NO DIFF)   CK   POCT GLUCOSE FINGERSTICK   POCT GLUCOSE FINGERSTICK   POCT GLUCOSE FINGERSTICK   POCT GLUCOSE FINGERSTICK   POCT GLUCOSE FINGERSTICK   CBC AND DIFFERENTIAL    Narrative:     The following orders were created for panel order CBC & Differential.  Procedure                               Abnormality         Status                     ---------                               -----------         ------                     CBC Auto Differential[[id number]]        Abnormal            Final result                 Please view results for these tests on the individual orders.   GREEN TOP    LAVENDER TOP   GOLD TOP - SST   LIGHT BLUE TOP       EKG:   ECG 12 Lead Altered Mental Status   Preliminary Result   HEART RKDO=499  bpm   RR Qxwpksjq=501  ms   MN Rwfrhmhy=715  ms   P Horizontal Axis=28  deg   P Front Axis=2  deg   QRSD Qhgldrju=498  ms   QT Ywfalasx=171  ms   CMiE=657  ms   QRS Axis=-28  deg   T Wave Axis=21  deg   - ABNORMAL ECG -   Sinus tachycardia   Ventricular premature complex   Prolonged MN interval   Inferior infarct, old   Date and Time of Study:2025-03-22 00:56:08          Meds given in ED:   Medications   sodium chloride 0.9 % flush 10 mL (has no administration in time range)   sodium chloride 0.9 % flush 10 mL (has no administration in time range)   sodium chloride 0.9 % flush 10 mL (has no administration in time range)   sodium chloride 0.9 % infusion 40 mL (has no administration in time range)   sodium chloride 0.9 % infusion (has no administration in time range)   acetaminophen (TYLENOL) tablet 650 mg (has no administration in time range)     Or   acetaminophen (TYLENOL) 160 MG/5ML oral solution 650 mg (has no administration in time range)     Or   acetaminophen (TYLENOL) suppository 650 mg (has no administration in time range)   sennosides-docusate (PERICOLACE) 8.6-50 MG per tablet 2 tablet (has no administration in time range)     And   polyethylene glycol (MIRALAX) packet 17 g (has no administration in time range)     And   bisacodyl (DULCOLAX) EC tablet 5 mg (has no administration in time range)     And   bisacodyl (DULCOLAX) suppository 10 mg (has no administration in time range)   ondansetron ODT (ZOFRAN-ODT) disintegrating tablet 4 mg (has no administration in time range)     Or   ondansetron (ZOFRAN) injection 4 mg (has no administration in time range)   calcium carbonate (TUMS) chewable tablet 500 mg (200 mg elemental) (has no administration in time range)   dextrose (GLUTOSE) oral gel 15 g (has no administration in time range)   dextrose (D50W) (25 g/50 mL) IV injection  25 g (has no administration in time range)   glucagon (GLUCAGEN) injection 1 mg (has no administration in time range)   insulin lispro (HUMALOG/ADMELOG) injection 2-7 Units (has no administration in time range)   lactated ringers bolus 1,000 mL (0 mL Intravenous Stopped 3/22/25 0359)       Imaging results:  XR Chest 1 View  Result Date: 3/22/2025   Allowing for submaximal inspiratory result, heart size appears within normal limits.  There is a submaximal inspiratory result. Allowing for that, there is no convincing evidence of infiltrate, effusion or pneumothorax.    This report was finalized on 3/22/2025 1:05 AM by Dr. Haider Carlson M.D on Workstation: [id number]        Ambulatory status:   - Assist x2    Social issues:   Social History     Socioeconomic History    Marital status:    Tobacco Use    Smoking status: Never     Passive exposure: Never    Smokeless tobacco: Never   Vaping Use    Vaping status: Never Used   Substance and Sexual Activity    Alcohol use: Never    Drug use: Not Currently    Sexual activity: Never     Birth control/protection: Abstinence       Peripheral Neurovascular  Peripheral Neurovascular (Adult)  Peripheral Neurovascular WDL: .WDL except, all  Capillary Refill, General: greater than 3 secs  Pulse Assessment: brachial  Pulse Brachial  Left Brachial Pulse: 2+ (normal)  Right Brachial Pulse: 2+ (normal)  LUE Neurovascular Assessment  Temperature LUE: cool  Color LUE: pale  RUE Neurovascular Assessment  Temperature RUE: cool  Color RUE: pale  LLE Neurovascular Assessment  Temperature LLE: cool  Color LLE: pale  RLE Neurovascular Assessment  Temperature RLE: cool  Color RLE: pale    Neuro Cognitive  Neuro Cognitive (Adult)  Cognitive/Neuro/Behavioral WDL: .WDL except, all  Level of Consciousness: Responds to Pain  Arousal Level: arouses to vigorous stimulation  Orientation: disoriented to, place, time, situation  Speech: incoherent  Mood/Behavior: withdrawn,  calm    Learning  Learning Assessment  Learning Readiness and Ability: sensory deficit noted, psychosocial barrier noted  Education Provided  Person Taught: patient  Teaching Method: other (see comments)  Teaching Focus: medical device/equipment use  Education Outcome Evaluation: no evidence of learning, needs reinforcement    Respiratory  Respiratory  Airway WDL: WDL  Respiratory WDL  Respiratory WDL: .WDL except, all  Rhythm/Pattern, Respiratory: bradypneic, shortness of breath, shallow, snoring  Nailbeds: pale  Mucous Membranes: dry, pale, cracked  Cough Frequency: infrequent  Cough Type: dry    Abdominal Pain       Pain Assessments       NIH Stroke Scale       Berta Cunningham RN  03/22/25 05:10 EDT

## 2025-03-22 NOTE — ED TRIAGE NOTES
To ER via EMS from [facility].  EMS was called for AMS and hypoxia O2 Sat 90%.  At approx 1800 pt was slightly altered, but still talking.  Pt stopped talking so EMS was called.  Facility stated pt has been declining for the last 3 days.      Accu check 112    Pt has hx of Parkinson's.

## 2025-03-22 NOTE — ED PROVIDER NOTES
ED Course as of 03/22/25 0536   Sat Mar 22, 2025   0230 Pt care assumed from  Dr. Ellis, pending CT scan and admission. [MP]   0320 CT scan of head independently interpreted by me as no hemorrhage [MP]   0504 I spoke with Tameka with [facility].  Discussed patient presentation and ED findings.  She agrees admit patient to a telemetry observation bed. [MP]      ED Course User Index  [MP] Leyla Aviles, Leyla Javed PA-C  03/22/25 0537

## 2025-03-23 ENCOUNTER — APPOINTMENT (OUTPATIENT)
Dept: GENERAL RADIOLOGY | Facility: HOSPITAL | Age: 75
DRG: 682 | End: 2025-03-23
Payer: MEDICARE

## 2025-03-23 LAB
ANION GAP SERPL CALCULATED.3IONS-SCNC: 9 MMOL/L (ref 5–15)
BUN SERPL-MCNC: 28 MG/DL (ref 8–23)
BUN/CREAT SERPL: 27.2 (ref 7–25)
CALCIUM SPEC-SCNC: 8.4 MG/DL (ref 8.6–10.5)
CHLORIDE SERPL-SCNC: 110 MMOL/L (ref 98–107)
CK SERPL-CCNC: 213 U/L (ref 20–200)
CO2 SERPL-SCNC: 22 MMOL/L (ref 22–29)
CREAT SERPL-MCNC: 1.03 MG/DL (ref 0.76–1.27)
DEPRECATED RDW RBC AUTO: 42.2 FL (ref 37–54)
EGFRCR SERPLBLD CKD-EPI 2021: 77.2 ML/MIN/1.73
ERYTHROCYTE [DISTWIDTH] IN BLOOD BY AUTOMATED COUNT: 13 % (ref 12.3–15.4)
GLUCOSE BLDC GLUCOMTR-MCNC: 114 MG/DL (ref 70–130)
GLUCOSE BLDC GLUCOMTR-MCNC: 141 MG/DL (ref 70–130)
GLUCOSE BLDC GLUCOMTR-MCNC: 199 MG/DL (ref 70–130)
GLUCOSE BLDC GLUCOMTR-MCNC: 223 MG/DL (ref 70–130)
GLUCOSE SERPL-MCNC: 101 MG/DL (ref 65–99)
HCT VFR BLD AUTO: 35.3 % (ref 37.5–51)
HGB BLD-MCNC: 11.8 G/DL (ref 13–17.7)
MCH RBC QN AUTO: 29.9 PG (ref 26.6–33)
MCHC RBC AUTO-ENTMCNC: 33.4 G/DL (ref 31.5–35.7)
MCV RBC AUTO: 89.6 FL (ref 79–97)
PLATELET # BLD AUTO: 229 10*3/MM3 (ref 140–450)
PMV BLD AUTO: 9.6 FL (ref 6–12)
POTASSIUM SERPL-SCNC: 4.3 MMOL/L (ref 3.5–5.2)
RBC # BLD AUTO: 3.94 10*6/MM3 (ref 4.14–5.8)
SODIUM SERPL-SCNC: 141 MMOL/L (ref 136–145)
WBC NRBC COR # BLD AUTO: 8.59 10*3/MM3 (ref 3.4–10.8)

## 2025-03-23 PROCEDURE — G0378 HOSPITAL OBSERVATION PER HR: HCPCS

## 2025-03-23 PROCEDURE — 25810000003 SODIUM CHLORIDE 0.9 % SOLUTION: Performed by: NURSE PRACTITIONER

## 2025-03-23 PROCEDURE — 82550 ASSAY OF CK (CPK): CPT | Performed by: HOSPITALIST

## 2025-03-23 PROCEDURE — 97110 THERAPEUTIC EXERCISES: CPT

## 2025-03-23 PROCEDURE — 73521 X-RAY EXAM HIPS BI 2 VIEWS: CPT

## 2025-03-23 PROCEDURE — 25010000002 ENOXAPARIN PER 10 MG: Performed by: INTERNAL MEDICINE

## 2025-03-23 PROCEDURE — 97162 PT EVAL MOD COMPLEX 30 MIN: CPT

## 2025-03-23 PROCEDURE — 97166 OT EVAL MOD COMPLEX 45 MIN: CPT

## 2025-03-23 PROCEDURE — 80048 BASIC METABOLIC PNL TOTAL CA: CPT | Performed by: HOSPITALIST

## 2025-03-23 PROCEDURE — 63710000001 INSULIN LISPRO (HUMAN) PER 5 UNITS: Performed by: NURSE PRACTITIONER

## 2025-03-23 PROCEDURE — 82948 REAGENT STRIP/BLOOD GLUCOSE: CPT

## 2025-03-23 PROCEDURE — 85027 COMPLETE CBC AUTOMATED: CPT | Performed by: HOSPITALIST

## 2025-03-23 RX ORDER — ENOXAPARIN SODIUM 100 MG/ML
40 INJECTION SUBCUTANEOUS EVERY 24 HOURS
Status: DISCONTINUED | OUTPATIENT
Start: 2025-03-23 | End: 2025-03-30 | Stop reason: HOSPADM

## 2025-03-23 RX ORDER — LORAZEPAM 2 MG/ML
1 INJECTION INTRAMUSCULAR ONCE AS NEEDED
Status: COMPLETED | OUTPATIENT
Start: 2025-03-23 | End: 2025-03-24

## 2025-03-23 RX ADMIN — INSULIN LISPRO 3 UNITS: 100 INJECTION, SOLUTION INTRAVENOUS; SUBCUTANEOUS at 22:52

## 2025-03-23 RX ADMIN — GABAPENTIN 300 MG: 300 CAPSULE ORAL at 20:08

## 2025-03-23 RX ADMIN — ACETAMINOPHEN 650 MG: 325 TABLET, FILM COATED ORAL at 22:58

## 2025-03-23 RX ADMIN — MIRTAZAPINE 45 MG: 15 TABLET, FILM COATED ORAL at 20:08

## 2025-03-23 RX ADMIN — CARBIDOPA AND LEVODOPA 1.5 TABLET: 25; 250 TABLET ORAL at 20:08

## 2025-03-23 RX ADMIN — SENNOSIDES AND DOCUSATE SODIUM 1 TABLET: 50; 8.6 TABLET ORAL at 08:54

## 2025-03-23 RX ADMIN — Medication 1000 MCG: at 08:54

## 2025-03-23 RX ADMIN — CARBIDOPA AND LEVODOPA 1.5 TABLET: 25; 250 TABLET ORAL at 12:16

## 2025-03-23 RX ADMIN — MICONAZOLE NITRATE 1 APPLICATION: 20 CREAM TOPICAL at 22:54

## 2025-03-23 RX ADMIN — CARBIDOPA AND LEVODOPA 1 TABLET: 50; 200 TABLET, EXTENDED RELEASE ORAL at 20:08

## 2025-03-23 RX ADMIN — ENOXAPARIN SODIUM 40 MG: 100 INJECTION SUBCUTANEOUS at 16:31

## 2025-03-23 RX ADMIN — ACETAMINOPHEN 650 MG: 325 TABLET, FILM COATED ORAL at 17:45

## 2025-03-23 RX ADMIN — SERTRALINE HYDROCHLORIDE 75 MG: 50 TABLET, FILM COATED ORAL at 08:54

## 2025-03-23 RX ADMIN — SODIUM CHLORIDE 100 ML/HR: 9 INJECTION, SOLUTION INTRAVENOUS at 00:27

## 2025-03-23 RX ADMIN — ASPIRIN 81 MG: 81 TABLET, COATED ORAL at 08:55

## 2025-03-23 RX ADMIN — GABAPENTIN 300 MG: 300 CAPSULE ORAL at 08:54

## 2025-03-23 RX ADMIN — MICONAZOLE NITRATE 1 APPLICATION: 20 CREAM TOPICAL at 09:01

## 2025-03-23 RX ADMIN — CARBIDOPA AND LEVODOPA 1.5 TABLET: 25; 250 TABLET ORAL at 01:04

## 2025-03-23 RX ADMIN — MICONAZOLE NITRATE 1 APPLICATION: 20 CREAM TOPICAL at 09:00

## 2025-03-23 RX ADMIN — Medication 10 ML: at 22:55

## 2025-03-23 RX ADMIN — CARBIDOPA AND LEVODOPA 1.5 TABLET: 25; 250 TABLET ORAL at 17:45

## 2025-03-23 RX ADMIN — ACETAMINOPHEN 650 MG: 325 TABLET, FILM COATED ORAL at 00:23

## 2025-03-23 RX ADMIN — Medication 10 ML: at 09:01

## 2025-03-23 RX ADMIN — CYCLOBENZAPRINE HYDROCHLORIDE 10 MG: 10 TABLET, FILM COATED ORAL at 22:58

## 2025-03-23 RX ADMIN — ACETAMINOPHEN 650 MG: 325 TABLET, FILM COATED ORAL at 12:55

## 2025-03-23 RX ADMIN — CARBIDOPA AND LEVODOPA 1.5 TABLET: 25; 250 TABLET ORAL at 08:54

## 2025-03-23 NOTE — THERAPY EVALUATION
Patient Name: Singh Hatch  : 1950    MRN: [id number]                              Today's Date: 3/23/2025       Admit Date: 3/22/2025    Visit Dx:     ICD-10-CM ICD-9-CM   1. Altered mental status, unspecified altered mental status type  R41.82 780.97   2. Non-traumatic rhabdomyolysis  M62.82 728.88   3. SHALOM (acute kidney injury)  N17.9 584.9   4. Polypharmacy  Z79.899 V58.69     Patient Active Problem List   Diagnosis    Chronic indwelling Holland catheter    Dementia    Parkinson disease    Type 2 diabetes mellitus with hyperglycemia, with long-term current use of insulin    Acute metabolic encephalopathy    Intractable abdominal pain    Constipation    Altered mental status    Essential (primary) hypertension     Past Medical History:   Diagnosis Date    Age-related osteoporosis without current pathological fracture     Atherosclerotic heart disease of native coronary artery without angina pectoris     Essential (primary) hypertension     Irritant contact dermatitis due to fecal, urinary or dual incontinence     Long term (current) use of insulin     Mixed hyperlipidemia     Other cervical disc degeneration, unspecified cervical region     Other intervertebral disc degeneration, 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     Other obstructive and reflux uropathy     Other specified anxiety disorders     Parkinson's disease without dyskinesia, without mention of fluctuations     Personal history of malignant neoplasm of prostate     Type 2 diabetes mellitus with hyperglycemia     Unspecified dementia, unspecified severity, without behavioral disturbance, psychotic disturbance, mood disturbance, and anxiety     Vitamin D deficiency, unspecified      History reviewed. No pertinent surgical history.   General Information       Row Name 25 1427          OT Time and Intention    Subjective Information no complaints  -RB     Document Type evaluation  -RB     Mode of Treatment individual  therapy;occupational therapy  -RB     Patient Effort good  -RB       Row Name 03/23/25 1427          General Information    Patient Profile Reviewed yes  -RB     Prior Level of Function ADL's;transfer  The pt requires assistance for all ADL's - use of weighted utensils for self feeding. manual wheelchair primarily, walker PRN  -RB     Existing Precautions/Restrictions fall  -RB     Barriers to Rehab medically complex;previous functional deficit  -RB       Row Name 03/23/25 1427          Living Environment    Current Living Arrangements extended care facility  -RB     People in Home facility resident  -RB       Row Name 03/23/25 1427          Cognition    Orientation Status (Cognition) oriented to;person;place;situation  -RB       Row Name 03/23/25 1427          Safety Issues/Impairments Affecting Functional Mobility    Impairments Affecting Function (Mobility) balance;coordination;endurance/activity tolerance;pain;strength;range of motion (ROM);sensation/sensory awareness  -RB               User Key  (r) = Recorded By, (t) = Taken By, (c) = Cosigned By      Initials Name Provider Type    RB Dafne Aguero OT Occupational Therapist                     Mobility/ADL's       Row Name 03/23/25 1430          Bed Mobility    Comment, (Bed Mobility) attempted long sitting and re-positioning measures with Max A required, poor tolerance.  -RB       Row Name 03/23/25 1430          Functional Mobility    Functional Mobility- Ind. Level not appropriate to assess  -RB       Row Name 03/23/25 1430          Activities of Daily Living    BADL Assessment/Intervention lower body dressing;feeding  -RB       Row Name 03/23/25 1430          Lower Body Dressing Assessment/Training    [place] Level (Lower Body Dressing) lower body dressing skills;dependent (less than 25% patient effort)  -RB     Position (Lower Body Dressing) supine  -RB       Row Name 03/23/25 1430          Self-Feeding Assessment/Training    [place] Level  (Feeding) feeding skills;set up  -RB     Position (Feeding) sitting up in bed  -RB     Comment, (Feeding) declined the need for built up utensils while in the hospital setting  -RB               User Key  (r) = Recorded By, (t) = Taken By, (c) = Cosigned By      Initials Name Provider Type    Dafne Matthews OT Occupational Therapist                   Obj/Interventions       Row Name 03/23/25 1434          Sensory Assessment (Somatosensory)    Sensory Assessment numbness tingling in BLE's - improved with positioning on pillows  -RB       Row Name 03/23/25 1434          Vision Assessment/Intervention    Visual Impairment/Limitations WFL  -RB       Row Name 03/23/25 1434          Range of Motion Comprehensive    Comment, General Range of Motion BUE AROM of shoulders limited ~25%, AAROM WFL.  -RB       Row Name 03/23/25 1434          Strength Comprehensive (MMT)    Comment, General Manual Muscle Testing (MMT) Assessment Generalized weakness present in BUE's  -RB       Row Name 03/23/25 1434          Shoulder (Therapeutic Exercise)    Shoulder (Therapeutic Exercise) AAROM (active assistive range of motion)  -RB     Shoulder AAROM (Therapeutic Exercise) bilateral;flexion;extension;aBduction;aDduction;external rotation;internal rotation;supine;10 repetitions  -RB       Row Name 03/23/25 1434          Elbow/Forearm (Therapeutic Exercise)    Elbow/Forearm (Therapeutic Exercise) AAROM (active assistive range of motion)  -RB     Elbow/Forearm AAROM (Therapeutic Exercise) bilateral;flexion;extension;supine;10 repetitions  -RB       Row Name 03/23/25 1434          Motor Skills    Therapeutic Exercise shoulder;elbow/forearm  -RB               User Key  (r) = Recorded By, (t) = Taken By, (c) = Cosigned By      Initials Name Provider Type    Dafne Matthews OT Occupational Therapist                   Goals/Plan       Row Name 03/23/25 1438          Bed Mobility Goal 1 (OT)    Activity/Assistive Device (Bed Mobility  Goal 1, OT) bed mobility activities, all  -RB     [place] Level/Cues Needed (Bed Mobility Goal 1, OT) standby assist;minimum assist (75% or more patient effort)  -RB     Time Frame (Bed Mobility Goal 1, OT) short term goal (STG);2 weeks  -RB     Progress/Outcomes (Bed Mobility Goal 1, OT) new goal  -RB       Row Name 03/23/25 1438          Transfer Goal 1 (OT)    Activity/Assistive Device (Transfer Goal 1, OT) transfers, all  -RB     [place] Level/Cues Needed (Transfer Goal 1, OT) moderate assist (50-74% patient effort)  -RB     Time Frame (Transfer Goal 1, OT) short term goal (STG);2 weeks  -RB     Progress/Outcome (Transfer Goal 1, OT) new goal  -RB       Row Name 03/23/25 1438          Bathing Goal 1 (OT)    Activity/Device (Bathing Goal 1, OT) bathing skills, all  -RB     [place] Level/Cues Needed (Bathing Goal 1, OT) moderate assist (50-74% patient effort)  -RB     Time Frame (Bathing Goal 1, OT) short term goal (STG);2 weeks  -RB     Progress/Outcomes (Bathing Goal 1, OT) new goal  -RB       Row Name 03/23/25 1438          Dressing Goal 1 (OT)    Activity/Device (Dressing Goal 1, OT) dressing skills, all  -RB     [place]/Cues Needed (Dressing Goal 1, OT) moderate assist (50-74% patient effort)  -RB     Time Frame (Dressing Goal 1, OT) short term goal (STG);2 weeks  -RB     Progress/Outcome (Dressing Goal 1, OT) new goal  -RB       Row Name 03/23/25 1438          Toileting Goal 1 (OT)    Activity/Device (Toileting Goal 1, OT) toileting skills, all  -RB     [place] Level/Cues Needed (Toileting Goal 1, OT) moderate assist (50-74% patient effort)  -RB     Time Frame (Toileting Goal 1, OT) short term goal (STG);2 weeks  -RB     Progress/Outcome (Toileting Goal 1, OT) new goal  -RB       Row Name 03/23/25 1438          Grooming Goal 1 (OT)    Activity/Device (Grooming Goal 1, OT) grooming skills, all  -RB     [place] (Grooming Goal 1, OT) standby assist  -RB     Time Frame (Grooming  Goal 1, OT) short term goal (STG);2 weeks  -RB     Progress/Outcome (Grooming Goal 1, OT) new goal  -RB       Row Name 03/23/25 1438          Self-Feeding Goal 1 (OT)    Activity/Device (Self-Feeding Goal 1, OT) self-feeding skills, all  -RB     [place] Level/Cues Needed (Self-Feeding Goal 1, OT) standby assist  -RB     Time Frame (Self-Feeding Goal 1, OT) short term goal (STG);2 weeks  -RB     Progress/Outcomes (Self-Feeding Goal 1, OT) new goal  -RB       Row Name 03/23/25 1438          Therapy Assessment/Plan (OT)    Planned Therapy Interventions (OT) transfer/mobility retraining;strengthening exercise;ROM/therapeutic exercise;activity tolerance training;adaptive equipment training;BADL retraining;functional balance retraining;occupation/activity based interventions;patient/caregiver education/training  -RB               User Key  (r) = Recorded By, (t) = Taken By, (c) = Cosigned By      Initials Name Provider Type    RB Dafne Aguero, OT Occupational Therapist                   Clinical Impression       Row Name 03/23/25 1436          Pain Assessment    Pre/Posttreatment Pain Comment Generalized b/l hip pain, no number rating  -RB       Row Name 03/23/25 1436          Plan of Care Review    Plan of Care Reviewed With patient  -RB     Progress no change  -RB     Outcome Evaluation The pt was admitted to [facility] 2/2 to [facility]. Dx includes non traumatic rhabdo, SHALOM, polypharmacy.  Pmhx significant for Parkinson's disease, diabetes, hyperlipidemia, CAD, recent T11 vertebral fracture. [facility] where he reports the use of both a manual wheelchair and walker PRN (assist x2 for transfers). Assistance is required for all ADL's from staff members. Today, the pt attempted long sitting with Max A, unable to tolerate his back leaving the bed significantly. The pt grimaced in pain with all attempts at re-positioning due to B/L hip pain. + numbness/tingling in BLEs which seemed to improve  with elevation on pillows. He was able to engage in upright BUE exercises/stretching in functional patterns. He reported that he uses weighted utensils at his facility 2/2 to tremoring - he was able to self feed this afternoon without difficulty, he declined the need for built up utensils. SNF is recommended at d/c.  -RB       Row Name 03/23/25 1436          Therapy Assessment/Plan (OT)    Rehab Potential (OT) good  -RB     Criteria for Skilled Therapeutic Interventions Met (OT) yes;skilled treatment is necessary  -RB     Therapy Frequency (OT) 3 times/wk  -RB       Row Name 03/23/25 1436          Therapy Plan Review/Discharge Plan (OT)    Anticipated Discharge Disposition (OT) skilled nursing facility  -RB       Row Name 03/23/25 1436          Positioning and Restraints    Pre-Treatment Position in bed  -RB     Post Treatment Position bed  -RB     In Bed notified nsg;supine;encouraged to call for assist;exit alarm on;fowlers;call light within reach;legs elevated  -RB               User Key  (r) = Recorded By, (t) = Taken By, (c) = Cosigned By      Initials Name Provider Type    RB Dafne Aguero, OT Occupational Therapist                   Outcome Measures       Row Name 03/23/25 1440          How much help from another is currently needed...    Putting on and taking off regular lower body clothing? 1  -RB     Bathing (including washing, rinsing, and drying) 2  -RB     Toileting (which includes using toilet bed pan or urinal) 1  -RB     Putting on and taking off regular upper body clothing 2  -RB     Taking care of personal grooming (such as brushing teeth) 2  -RB     Eating meals 3  -RB     AM-PAC 6 Clicks Score (OT) 11  -RB       Row Name 03/23/25 0854          How much help from another person do you currently need...    Turning from your back to your side while in flat bed without using bedrails? 1  -EB     Moving from lying on back to sitting on the side of a flat bed without bedrails? 1  -EB     Moving to  and from a bed to a chair (including a wheelchair)? 1  -EB     Standing up from a chair using your arms (e.g., wheelchair, bedside chair)? 1  -EB     Climbing 3-5 steps with a railing? 1  -EB     To walk in hospital room? 1  -EB     AM-PAC 6 Clicks Score (PT) 6  -EB       Row Name 03/23/25 1440          Modified [place] Scale    Modified [place] Scale 4 - Moderately severe disability.  Unable to walk without assistance, and unable to attend to own bodily needs without assistance.  -RB       Row Name 03/23/25 1440          Functional Assessment    Outcome Measure Options AM-PAC 6 Clicks Daily Activity (OT);Modified [place]  -RB               User Key  (r) = Recorded By, (t) = Taken By, (c) = Cosigned By      Initials Name Provider Type    Dafne Matthews, MARCK Occupational Therapist    Rosana Oliveira, RN Registered Nurse                      OT Recommendation and Plan  Planned Therapy Interventions (OT): transfer/mobility retraining, strengthening exercise, ROM/therapeutic exercise, activity tolerance training, adaptive equipment training, BADL retraining, functional balance retraining, occupation/activity based interventions, patient/caregiver education/training  Therapy Frequency (OT): 3 times/wk  Plan of Care Review  Plan of Care Reviewed With: patient  Progress: no change  Outcome Evaluation: The pt was admitted to [facility] 2/2 to [facility]. Dx includes non traumatic rhabdo, SHALOM, polypharmacy.  Pmhx significant for Parkinson's disease, diabetes, hyperlipidemia, CAD, recent T11 vertebral fracture. [facility] where he reports the use of both a manual wheelchair and walker PRN (assist x2 for transfers). Assistance is required for all ADL's from staff members. Today, the pt attempted long sitting with Max A, unable to tolerate his back leaving the bed significantly. The pt grimaced in pain with all attempts at re-positioning due to B/L hip pain. + numbness/tingling in BLEs which seemed to  improve with elevation on pillows. He was able to engage in upright BUE exercises/stretching in functional patterns. He reported that he uses weighted utensils at his facility 2/2 to tremoring - he was able to self feed this afternoon without difficulty, he declined the need for built up utensils. SNF is recommended at d/c.     Time Calculation:   Evaluation Complexity (OT)  Review Occupational Profile/Medical/Therapy History Complexity: expanded/moderate complexity  Assessment, Occupational Performance/Identification of Deficit Complexity: 5 or more performance deficits  Clinical Decision Making Complexity (OT): detailed assessment/moderate complexity  Overall Complexity of Evaluation (OT): moderate complexity     Time Calculation- OT       Row Name 03/23/25 1426             Time Calculation- OT    OT Start Time 1245  -RB      OT Stop Time 1311  -RB      OT Time Calculation (min) 26 min  -RB      Total Timed Code Minutes- OT 10 minute(s)  -RB      OT Received On 03/23/25  -RB      OT - Next Appointment 03/24/25  -RB      OT Goal Re-Cert Due Date 04/06/25  -RB         Timed Charges    41036 - OT Therapeutic Exercise Minutes 10  -RB         Untimed Charges    OT Eval/Re-eval Minutes 16  -RB         Total Minutes    Timed Charges Total Minutes 10  -RB      Untimed Charges Total Minutes 16  -RB       Total Minutes 26  -RB                User Key  (r) = Recorded By, (t) = Taken By, (c) = Cosigned By      Initials Name Provider Type    RB Dafne Aguero OT Occupational Therapist                  Therapy Charges for Today       Code Description Service Date Service Provider Modifiers Qty    [id number]  OT THER PROC EA 15 MIN 3/23/2025 Dafne Aguero OT GO 1    [id number]  OT EVAL MOD COMPLEXITY 3 3/23/2025 Dafne Aguero OT GO 1                 Dafne Aguero OT  3/23/2025

## 2025-03-23 NOTE — PROGRESS NOTES
Name: Singh Hatch ADMIT: 3/22/2025   : 1950  PCP: Salomon Mcknight MD    MRN: [id number] LOS: 0 days   AGE/SEX: 74 y.o. male  ROOM: [facility]   Subjective   Chief Complaint   Patient presents with    Altered Mental Status     Mental status seems better  BP is fair  H/o parkinsons  States numbness to legs bilateral x 1 month  H/o constipation    ROS  No f/c  No n/v  No cp/palp  No soa/cough    Objective   Vital Signs  Temp:  [97.3 °F (36.3 °C)-98.4 °F (36.9 °C)] 97.3 °F (36.3 °C)  Heart Rate:  [70-82] 81  Resp:  [18-20] 18  BP: (102-158)/(60-99) 144/99  SpO2:  [97 %-100 %] 98 %  on   ;   Device (Oxygen Therapy): room air  Body mass index is 20.23 kg/m².    Physical Exam  HENT:      Head: Normocephalic and atraumatic.   Eyes:      General: No scleral icterus.  Cardiovascular:      Rate and Rhythm: Normal rate and regular rhythm.      Heart sounds: Normal heart sounds.   Pulmonary:      Effort: Pulmonary effort is normal. No respiratory distress.      Breath sounds: Normal breath sounds.   Abdominal:      General: There is no distension.      Palpations: Abdomen is soft.      Tenderness: There is no abdominal tenderness.   Musculoskeletal:      Cervical back: Neck supple.   Neurological:      Mental Status: He is alert.   Psychiatric:         Behavior: Behavior normal.     Elderly  Chronically ill  +LE numbness and weakness    Results Review:       I reviewed the patient's new clinical results.  Results from last 7 days   Lab Units 25  0556 25  0650 25  0042   WBC 10*3/mm3 8.59 9.19 13.34*   HEMOGLOBIN g/dL 11.8* 14.0 13.9   PLATELETS 10*3/mm3 229 244 293     Results from last 7 days   Lab Units 25  0556 25  0650 25  0042   SODIUM mmol/L 141 139 141   POTASSIUM mmol/L 4.3 4.7 4.7   CHLORIDE mmol/L 110* 107 105   CO2 mmol/L 22.0 20.6* 24.6   BUN mg/dL 28* 36* 40*   CREATININE mg/dL 1.03 1.42* 1.51*   GLUCOSE mg/dL 101* 85 108*   Estimated Creatinine Clearance: 60.3  "mL/min (by C-G formula based on SCr of 1.03 mg/dL).  Results from last 7 days   Lab Units 03/22/25  0042   ALBUMIN g/dL 3.8   BILIRUBIN mg/dL 0.4   ALK PHOS U/L 132*   AST (SGOT) U/L 26   ALT (SGPT) U/L 5     Results from last 7 days   Lab Units 03/23/25  0556 03/22/25  0650 03/22/25  0042   CALCIUM mg/dL 8.4* 9.1 9.0   ALBUMIN g/dL  --   --  3.8   MAGNESIUM mg/dL  --   --  2.2         Coag     HbA1C   Lab Results   Component Value Date    HGBA1C 8.30 (H) 03/12/2025    HGBA1C 8.10 (H) 10/13/2024    HGBA1C 9.2 (H) 08/24/2024     Infection     Radiology(recent) CT Head Without Contrast  Result Date: 3/22/2025  Electronically signed by Tawanda Kinney MD on 03-22-25 at 0518    XR Chest 1 View  Result Date: 3/22/2025   Allowing for submaximal inspiratory result, heart size appears within normal limits.  There is a submaximal inspiratory result. Allowing for that, there is no convincing evidence of infiltrate, effusion or pneumothorax.    This report was finalized on 3/22/2025 1:05 AM by Dr. Haider Carlson M.D on Workstation: [id number]      No results found for: \"TROPONINT\", \"TROPONINI\", \"BNP\"  No components found for: \"TSH;2\"    aspirin, 81 mg, Oral, Daily  carbidopa-levodopa, 1.5 tablet, Oral, 4x Daily  carbidopa-levodopa CR, 1 tablet, Oral, Nightly  gabapentin, 300 mg, Oral, BID  glucagon (human recombinant), 1 mg, Intravenous, Once  insulin lispro, 2-7 Units, Subcutaneous, 4x Daily AC & at Bedtime  miconazole, 1 Application, Topical, BID  mirtazapine, 45 mg, Oral, Nightly  polyethylene glycol, 17 g, Oral, BID  sennosides-docusate, 1 tablet, Oral, BID  sertraline, 75 mg, Oral, Daily  sodium chloride, 10 mL, Intravenous, Q12H  vitamin B-12, 1,000 mcg, Oral, Daily       Diet: Diabetic; Consistent Carbohydrate; Fluid Consistency: Thin (IDDSI 0)      Assessment & Plan      Active Hospital Problems    Diagnosis  POA    **Altered mental status [R41.82]  Yes    Essential (primary) hypertension [I10]  Unknown    Type 2 " diabetes mellitus with hyperglycemia, with long-term current use of insulin [E11.65, Z79.4]  Not Applicable    Parkinson disease [G20.A1]  Yes    Dementia [F03.90]  Yes    Chronic indwelling Holland catheter [Z97.8]  Not Applicable      Resolved Hospital Problems   No resolved problems to display.     Mr. Hatch is a 74 y.o. male with a history of hyperlipidemia, DM, hypertension, CAD, BPH, anxiety, Parkinson's. He was recently here 3/12-3/15 for constipation and T11 vertebral fracture. DANNY recommended TLSO brace. Treated for UTI vs asymptomatic bacturia.     Came in with confusion for a few days. Mild SHALOM which is better. Confusion seems better. Could be SHALOM/dehydration vs polypharmacy.    States he's had worsening back pain and bilateral leg numbness and weakness. With recent T11 fracture will obtain MRI T/L spine.     Hx: Parkinson's  Nursing home staff says he has been confused, mildly hypoxic x 3 days  On quite a few sedating meds-polypharmacy?  SHALOM    Reviewed records      Jez Diaz MD  [facility]  03/23/25  14:32 EDT

## 2025-03-23 NOTE — PLAN OF CARE
Goal Outcome Evaluation:      Patient is a 74 y.o. male admitted to [facility] on 3/22/2025 for AMS, polypharmacy, SHALOM and non-traumatic rhabdomyolysis. PMHx includes PD, DM2, dementia and recent hospital admission on 3/12/2025. Per chart, patient requires Ax1 at baseline and was living in an [facility]. Patient voiced he has been working with therapies at a rehab facility, ambulating short distances with a RW. Today, patient performed bed mobility with min-modA, required min-modA for transfers, and ambulated a couple side steps towards HOB. Strength and balance deficits noted. L flank pain reported when achieving EOB. Tremulous throughout. Patient may benefit from skilled PT services acutely to address functional deficits as well as improve level of independence prior to discharge. Anticipate returning back to SNF for continued rehabilitation upon DC.      Anticipated Discharge Disposition (PT): skilled nursing facility

## 2025-03-23 NOTE — PLAN OF CARE
The pt was admitted to [facility] 2/2 to [facility]. Dx includes non traumatic rhabdo, SHALOM, polypharmacy. Pmhx significant for Parkinson's disease, diabetes, hyperlipidemia, CAD, recent T11 vertebral fracture. The pt has been residing at [facility] where he reports the use of both a manual wheelchair and walker PRN (assist x2 for transfers). Assistance is required for all ADL's from staff members. Today, the pt attempted long sitting with Max A, unable to tolerate his back leaving the bed significantly. The pt grimaced in pain with all attempts at re-positioning due to B/L hip pain. + numbness/tingling in BLEs which seemed to improve with elevation on pillows. He was able to engage in upright BUE exercises/stretching in functional patterns. He reported that he uses weighted utensils at his facility 2/2 to tremoring - he was able to self feed this afternoon without difficulty, he declined the need for built up utensils. SNF is recommended at d/c.

## 2025-03-23 NOTE — PLAN OF CARE
VSS this shift. Pain well controlled per MAR. Pt Aox3. Tolerating diet at this time. Xray of hips completed. MRI paperwork completed and faxed down. Patient has PRN med for MRI when done. Bed alarm on and call light in reach.

## 2025-03-23 NOTE — THERAPY EVALUATION
Patient Name: Singh Hatch  : 1950    MRN: [id number]                              Today's Date: 3/23/2025       Admit Date: 3/22/2025    Visit Dx:     ICD-10-CM ICD-9-CM   1. Altered mental status, unspecified altered mental status type  R41.82 780.97   2. Non-traumatic rhabdomyolysis  M62.82 728.88   3. SHALOM (acute kidney injury)  N17.9 584.9   4. Polypharmacy  Z79.899 V58.69     Patient Active Problem List   Diagnosis    Chronic indwelling Holland catheter    Dementia    Parkinson disease    Type 2 diabetes mellitus with hyperglycemia, with long-term current use of insulin    Acute metabolic encephalopathy    Intractable abdominal pain    Constipation    Altered mental status    Essential (primary) hypertension     Past Medical History:   Diagnosis Date    Age-related osteoporosis without current pathological fracture     Atherosclerotic heart disease of native coronary artery without angina pectoris     Essential (primary) hypertension     Irritant contact dermatitis due to fecal, urinary or dual incontinence     Long term (current) use of insulin     Mixed hyperlipidemia     Other cervical disc degeneration, unspecified cervical region     Other intervertebral disc degeneration, 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     Other obstructive and reflux uropathy     Other specified anxiety disorders     Parkinson's disease without dyskinesia, without mention of fluctuations     Personal history of malignant neoplasm of prostate     Type 2 diabetes mellitus with hyperglycemia     Unspecified dementia, unspecified severity, without behavioral disturbance, psychotic disturbance, mood disturbance, and anxiety     Vitamin D deficiency, unspecified      History reviewed. No pertinent surgical history.   General Information       Row Name 25 1513          Physical Therapy Time and Intention    Document Type evaluation  -MS     Mode of Treatment physical therapy  -MS       Row Name 25 1514           General Information    Patient Profile Reviewed yes  -MS     Prior Level of Function min assist:;all household mobility  Working with therapies at facility, RW for short distances and WC via stand pivot per chart review, limited historian  -MS     Existing Precautions/Restrictions fall  -MS     Barriers to Rehab medically complex;previous functional deficit  -MS       Row Name 03/23/25 1513          Living Environment    Current Living Arrangements extended care facility  -MS     People in Home facility resident  -MS       Row Name 03/23/25 1513          Cognition    Orientation Status (Cognition) oriented to;person;place;situation  -MS       Row Name 03/23/25 1513          Safety Issues/Impairments Affecting Functional Mobility    Safety Issues Affecting Function (Mobility) insight into deficits/self-awareness;judgment;positioning of assistive device;sequencing abilities  -MS     Impairments Affecting Function (Mobility) balance;coordination;endurance/activity tolerance;pain;strength;range of motion (ROM);sensation/sensory awareness  -MS     Comment, Safety Issues/Impairments (Mobility) Gait belt and non skid socks donned.  -MS               User Key  (r) = Recorded By, (t) = Taken By, (c) = Cosigned By      Initials Name Provider Type    MS Leyla Baez, PT Physical Therapist                   Mobility       Row Name 03/23/25 1514          Bed Mobility    Bed Mobility supine-sit;sit-supine  -MS     Supine-Sit [place] (Bed Mobility) minimum assist (75% patient effort);moderate assist (50% patient effort);verbal cues;nonverbal cues (demo/gesture)  -MS     Sit-Supine [place] (Bed Mobility) moderate assist (50% patient effort);nonverbal cues (demo/gesture);verbal cues  -MS     Assistive Device (Bed Mobility) bed rails;head of bed elevated  -MS     Comment, (Bed Mobility) SBA-CGA for sitting balance, incr time needed, L flank pain reported with mobility.  -MS       Row Name 03/23/25 1514           Transfers    Comment, (Transfers) Sequencing and hand placement cues. 3 STS total.  -MS       Row Name 03/23/25 1514          Sit-Stand Transfer    Sit-Stand [place] (Transfers) minimum assist (75% patient effort);moderate assist (50% patient effort);verbal cues;nonverbal cues (demo/gesture)  -MS     Assistive Device (Sit-Stand Transfers) walker, front-wheeled  -MS       Row Name 03/23/25 1514          Gait/Stairs (Locomotion)    [place] Level (Gait) minimum assist (75% patient effort);verbal cues;nonverbal cues (demo/gesture)  -MS     Assistive Device (Gait) walker, front-wheeled  -MS     Patient was able to Ambulate yes  -MS     Comment, (Gait/Stairs) Small steps towards HOB, fatigued quickly and pain reported. Tremulous throughout.  -MS               User Key  (r) = Recorded By, (t) = Taken By, (c) = Cosigned By      Initials Name Provider Type    Leyla Zuniga, PT Physical Therapist                   Obj/Interventions       Row Name 03/23/25 1515          Range of Motion Comprehensive    Comment, General Range of Motion B LEs grossly 75% WFL  -MS       Row Name 03/23/25 1515          Strength Comprehensive (MMT)    Comment, General Manual Muscle Testing (MMT) Assessment B LEs grossly 4/5, generalized weakness noted.  -MS       Row Name 03/23/25 1515          Balance    Balance Assessment sitting static balance;standing static balance  -MS     Static Sitting Balance contact guard  -MS     Position, Sitting Balance sitting edge of bed  -MS     Static Standing Balance minimal assist  -MS     Position/Device Used, Standing Balance supported;walker, front-wheeled  -MS       Row Name 03/23/25 1515          Sensory Assessment (Somatosensory)    Sensory Assessment (Somatosensory) sensation intact  -MS               User Key  (r) = Recorded By, (t) = Taken By, (c) = Cosigned By      Initials Name Provider Type    Leyla Zuniga, PT Physical Therapist                   Goals/Plan       Row Name  03/23/25 1516          Bed Mobility Goal 1 (PT)    Activity/Assistive Device (Bed Mobility Goal 1, PT) bed mobility activities, all  -MS     [place] Level/Cues Needed (Bed Mobility Goal 1, PT) standby assist  -MS     Time Frame (Bed Mobility Goal 1, PT) 1 week  -MS       Row Name 03/23/25 1516          Transfer Goal 1 (PT)    Activity/Assistive Device (Transfer Goal 1, PT) transfers, all;walker, rolling  -MS     [place] Level/Cues Needed (Transfer Goal 1, PT) contact guard required  -MS     Time Frame (Transfer Goal 1, PT) 1 week  -MS       Row Name 03/23/25 1516          Gait Training Goal 1 (PT)    Activity/Assistive Device (Gait Training Goal 1, PT) gait (walking locomotion);walker, rolling  -MS     [place] Level (Gait Training Goal 1, PT) contact guard required  -MS     Distance (Gait Training Goal 1, PT) 50  -MS     Time Frame (Gait Training Goal 1, PT) 1 week  -MS       Row Name 03/23/25 1516          Therapy Assessment/Plan (PT)    Planned Therapy Interventions (PT) balance training;bed mobility training;gait training;home exercise program;postural re-education;patient/family education;ROM (range of motion);stair training;strengthening;transfer training  -MS               User Key  (r) = Recorded By, (t) = Taken By, (c) = Cosigned By      Initials Name Provider Type    MS CherrysLeyla, PT Physical Therapist                   Clinical Impression       Row Name 03/23/25 1515          Pain    Pretreatment Pain Rating 7/10  -MS     Posttreatment Pain Rating 7/10  -MS     Pain Location flank  -MS     Pain Side/Orientation left  -MS     Pain Management Interventions exercise or physical activity utilized;positioning techniques utilized  -MS     Response to Pain Interventions activity participation with tolerable pain  -MS       Row Name 03/23/25 1515          Therapy Assessment/Plan (PT)    Rehab Potential (PT) good  -MS     Criteria for Skilled Interventions Met (PT) yes;meets criteria  -MS      Therapy Frequency (PT) 5 times/wk  -MS       Row Name 03/23/25 1515          Vital Signs    O2 Delivery Pre Treatment room air  -MS       Row Name 03/23/25 1515          Positioning and Restraints    Pre-Treatment Position in bed  -MS     Post Treatment Position bed  -MS     In Bed fowlers;call light within reach;encouraged to call for assist;exit alarm on  -MS               User Key  (r) = Recorded By, (t) = Taken By, (c) = Cosigned By      Initials Name Provider Type    MS BaezLeyla, PT Physical Therapist                   Outcome Measures       Row Name 03/23/25 1516 03/23/25 0854       How much help from another person do you currently need...    Turning from your back to your side while in flat bed without using bedrails? 2  -MS 1  -EB    Moving from lying on back to sitting on the side of a flat bed without bedrails? 1  -MS 1  -EB    Moving to and from a bed to a chair (including a wheelchair)? 2  -MS 1  -EB    Standing up from a chair using your arms (e.g., wheelchair, bedside chair)? 2  -MS 1  -EB    Climbing 3-5 steps with a railing? 1  -MS 1  -EB    To walk in hospital room? 1  -MS 1  -EB    AM-PAC 6 Clicks Score (PT) 9  -MS 6  -EB      Row Name 03/23/25 1440          Modified Davidson Scale    Modified [place] Scale 4 - Moderately severe disability.  Unable to walk without assistance, and unable to attend to own bodily needs without assistance.  -RB       Row Name 03/23/25 1440          Functional Assessment    Outcome Measure Options AM-PAC 6 Clicks Daily Activity (OT);Modified Davidson  -RB               User Key  (r) = Recorded By, (t) = Taken By, (c) = Cosigned By      Initials Name Provider Type    MS CherrysLeyla, PT Physical Therapist    Dafne Matthews OT Occupational Therapist    Rosana Oliveira, RN Registered Nurse                                 Physical Therapy Education       Title: PT OT SLP Therapies (In Progress)       Topic: Physical Therapy (Done)       Point:  Mobility training (Done)       Learning Progress Summary            Patient Acceptance, E,TB, VU by MS at 3/23/2025 1516                      Point: Home exercise program (Done)       Learning Progress Summary            Patient Acceptance, E,TB, VU by MS at 3/23/2025 1516                      Point: Body mechanics (Done)       Learning Progress Summary            Patient Acceptance, E,TB, VU by MS at 3/23/2025 1516                      Point: Precautions (Done)       Learning Progress Summary            Patient Acceptance, E,TB, VU by MS at 3/23/2025 1516                                      User Key       Initials Effective Dates Name Provider Type Discipline    MS 06/16/21 -  Leyla Baez, PT Physical Therapist PT                  PT Recommendation and Plan  Planned Therapy Interventions (PT): balance training, bed mobility training, gait training, home exercise program, postural re-education, patient/family education, ROM (range of motion), stair training, strengthening, transfer training        Time Calculation:         PT Charges       Row Name 03/23/25 1513             Time Calculation    Start Time 1052  -MS      Stop Time 1109  -MS      Time Calculation (min) 17 min  -MS      PT Received On 03/23/25  -MS      PT - Next Appointment 03/24/25  -MS      PT Goal Re-Cert Due Date 03/30/25  -MS                User Key  (r) = Recorded By, (t) = Taken By, (c) = Cosigned By      Initials Name Provider Type    MS BaezLeyla PT Physical Therapist                  Therapy Charges for Today       Code Description Service Date Service Provider Modifiers Qty    70474062576 HC PT EVAL MOD COMPLEXITY 3 3/23/2025 Leyla Baez, PT GP 1    45857437877 HC PT THER PROC EA 15 MIN 3/23/2025 Leyla Baez, PT GP 1            PT G-Codes  Outcome Measure Options: AM-PAC 6 Clicks Daily Activity (OT), Modified Katina  AM-PAC 6 Clicks Score (PT): 9  AM-PAC 6 Clicks Score (OT): 11  Modified [place] Scale: 4 -  Moderately severe disability.  Unable to walk without assistance, and unable to attend to own bodily needs without assistance.  PT Discharge Summary  Anticipated Discharge Disposition (PT): skilled nursing facility    Leyla Baez, PT  3/23/2025

## 2025-03-23 NOTE — PLAN OF CARE
Goal Outcome Evaluation:  Plan for return to [facility] @ discharge.                                             O-Z Plasty Text: The defect edges were debeveled with a #15 scalpel blade.  Given the location of the defect, shape of the defect and the proximity to free margins an O-Z plasty (double transposition flap) was deemed most appropriate.  Using a sterile surgical marker, the appropriate transposition flaps were drawn incorporating the defect and placing the expected incisions within the relaxed skin tension lines where possible.    The area thus outlined was incised deep to adipose tissue with a #15 scalpel blade.  The skin margins were undermined to an appropriate distance in all directions utilizing iris scissors.  Hemostasis was achieved with electrocautery.  The flaps were then transposed into place, one clockwise and the other counterclockwise, and anchored with interrupted buried subcutaneous sutures.

## 2025-03-24 ENCOUNTER — APPOINTMENT (OUTPATIENT)
Dept: MRI IMAGING | Facility: HOSPITAL | Age: 75
DRG: 682 | End: 2025-03-24
Payer: MEDICARE

## 2025-03-24 LAB
GLUCOSE BLDC GLUCOMTR-MCNC: 110 MG/DL (ref 70–130)
GLUCOSE BLDC GLUCOMTR-MCNC: 138 MG/DL (ref 70–130)
GLUCOSE BLDC GLUCOMTR-MCNC: 149 MG/DL (ref 70–130)
GLUCOSE BLDC GLUCOMTR-MCNC: 184 MG/DL (ref 70–130)

## 2025-03-24 PROCEDURE — G0378 HOSPITAL OBSERVATION PER HR: HCPCS

## 2025-03-24 PROCEDURE — 25010000002 ENOXAPARIN PER 10 MG: Performed by: INTERNAL MEDICINE

## 2025-03-24 PROCEDURE — 25010000002 ONDANSETRON PER 1 MG: Performed by: NURSE PRACTITIONER

## 2025-03-24 PROCEDURE — A9577 INJ MULTIHANCE: HCPCS | Performed by: INTERNAL MEDICINE

## 2025-03-24 PROCEDURE — 25510000002 GADOBENATE DIMEGLUMINE 529 MG/ML SOLUTION: Performed by: INTERNAL MEDICINE

## 2025-03-24 PROCEDURE — 63710000001 INSULIN LISPRO (HUMAN) PER 5 UNITS: Performed by: NURSE PRACTITIONER

## 2025-03-24 PROCEDURE — 82948 REAGENT STRIP/BLOOD GLUCOSE: CPT

## 2025-03-24 PROCEDURE — 25010000002 LORAZEPAM PER 2 MG: Performed by: INTERNAL MEDICINE

## 2025-03-24 RX ORDER — LORAZEPAM 2 MG/ML
1 INJECTION INTRAMUSCULAR ONCE
Status: DISCONTINUED | OUTPATIENT
Start: 2025-03-24 | End: 2025-03-30 | Stop reason: HOSPADM

## 2025-03-24 RX ADMIN — MIRTAZAPINE 45 MG: 15 TABLET, FILM COATED ORAL at 20:53

## 2025-03-24 RX ADMIN — LORAZEPAM 1 MG: 2 INJECTION INTRAMUSCULAR; INTRAVENOUS at 10:09

## 2025-03-24 RX ADMIN — SENNOSIDES AND DOCUSATE SODIUM 1 TABLET: 50; 8.6 TABLET ORAL at 20:53

## 2025-03-24 RX ADMIN — POLYETHYLENE GLYCOL 3350 17 G: 17 POWDER, FOR SOLUTION ORAL at 20:53

## 2025-03-24 RX ADMIN — ONDANSETRON 4 MG: 2 INJECTION, SOLUTION INTRAMUSCULAR; INTRAVENOUS at 01:14

## 2025-03-24 RX ADMIN — CARBIDOPA AND LEVODOPA 1.5 TABLET: 25; 250 TABLET ORAL at 17:51

## 2025-03-24 RX ADMIN — CARBIDOPA AND LEVODOPA 1.5 TABLET: 25; 250 TABLET ORAL at 12:45

## 2025-03-24 RX ADMIN — CARBIDOPA AND LEVODOPA 1.5 TABLET: 25; 250 TABLET ORAL at 09:54

## 2025-03-24 RX ADMIN — SENNOSIDES AND DOCUSATE SODIUM 1 TABLET: 50; 8.6 TABLET ORAL at 09:55

## 2025-03-24 RX ADMIN — SERTRALINE HYDROCHLORIDE 75 MG: 50 TABLET, FILM COATED ORAL at 09:54

## 2025-03-24 RX ADMIN — INSULIN LISPRO 2 UNITS: 100 INJECTION, SOLUTION INTRAVENOUS; SUBCUTANEOUS at 20:54

## 2025-03-24 RX ADMIN — CARBIDOPA AND LEVODOPA 1.5 TABLET: 25; 250 TABLET ORAL at 20:54

## 2025-03-24 RX ADMIN — MICONAZOLE NITRATE 1 APPLICATION: 20 CREAM TOPICAL at 09:56

## 2025-03-24 RX ADMIN — MICONAZOLE NITRATE 1 APPLICATION: 20 CREAM TOPICAL at 20:55

## 2025-03-24 RX ADMIN — CARBIDOPA AND LEVODOPA 1 TABLET: 50; 200 TABLET, EXTENDED RELEASE ORAL at 20:54

## 2025-03-24 RX ADMIN — GABAPENTIN 300 MG: 300 CAPSULE ORAL at 09:55

## 2025-03-24 RX ADMIN — ASPIRIN 81 MG: 81 TABLET, COATED ORAL at 09:54

## 2025-03-24 RX ADMIN — ENOXAPARIN SODIUM 40 MG: 100 INJECTION SUBCUTANEOUS at 15:46

## 2025-03-24 RX ADMIN — Medication 10 ML: at 09:55

## 2025-03-24 RX ADMIN — CYCLOBENZAPRINE HYDROCHLORIDE 10 MG: 10 TABLET, FILM COATED ORAL at 20:54

## 2025-03-24 RX ADMIN — GADOBENATE DIMEGLUMINE 15 ML: 529 INJECTION, SOLUTION INTRAVENOUS at 12:45

## 2025-03-24 RX ADMIN — Medication 10 ML: at 20:55

## 2025-03-24 RX ADMIN — ONDANSETRON 4 MG: 2 INJECTION, SOLUTION INTRAMUSCULAR; INTRAVENOUS at 10:08

## 2025-03-24 RX ADMIN — GABAPENTIN 300 MG: 300 CAPSULE ORAL at 20:53

## 2025-03-24 RX ADMIN — Medication 1000 MCG: at 09:54

## 2025-03-24 NOTE — PROGRESS NOTES
Name: Singh Hatch ADMIT: 3/22/2025   : 1950  PCP: Salomon Mcknight MD    MRN: [id number] LOS: 0 days   AGE/SEX: 74 y.o. male  ROOM: [facility]   Subjective   Chief Complaint   Patient presents with    Altered Mental Status     Mental status seems better  Doing ok this morning  BP is fair  H/o parkinsons  States numbness to legs bilateral x 1 month  H/o constipation  To have MRI today    ROS  No f/c  No n/v  No cp/palp  No soa/cough    Objective   Vital Signs  Temp:  [97.5 °F (36.4 °C)-97.6 °F (36.4 °C)] 97.6 °F (36.4 °C)  Heart Rate:  [76-82] 82  Resp:  [18] 18  BP: (121-152)/(76-82) 144/82  SpO2:  [96 %-99 %] 99 %  on   ;   Device (Oxygen Therapy): room air  Body mass index is 20.23 kg/m².    Physical Exam  HENT:      Head: Normocephalic and atraumatic.   Eyes:      General: No scleral icterus.  Cardiovascular:      Rate and Rhythm: Normal rate and regular rhythm.      Heart sounds: Normal heart sounds.   Pulmonary:      Effort: Pulmonary effort is normal. No respiratory distress.      Breath sounds: Normal breath sounds.   Abdominal:      General: There is no distension.      Palpations: Abdomen is soft.      Tenderness: There is no abdominal tenderness.   Musculoskeletal:      Cervical back: Neck supple.   Neurological:      Mental Status: He is alert.   Psychiatric:         Behavior: Behavior normal.     Elderly  Chronically ill  +LE numbness and weakness    Results Review:       I reviewed the patient's new clinical results.  Results from last 7 days   Lab Units 25  0556 25  0650 25  0042   WBC 10*3/mm3 8.59 9.19 13.34*   HEMOGLOBIN g/dL 11.8* 14.0 13.9   PLATELETS 10*3/mm3 229 244 293     Results from last 7 days   Lab Units 25  0556 25  0650 25  0042   SODIUM mmol/L 141 139 141   POTASSIUM mmol/L 4.3 4.7 4.7   CHLORIDE mmol/L 110* 107 105   CO2 mmol/L 22.0 20.6* 24.6   BUN mg/dL 28* 36* 40*   CREATININE mg/dL 1.03 1.42* 1.51*   GLUCOSE mg/dL 101* 85 108*  "  Estimated Creatinine Clearance: 60.3 mL/min (by C-G formula based on SCr of 1.03 mg/dL).  Results from last 7 days   Lab Units 03/22/25  0042   ALBUMIN g/dL 3.8   BILIRUBIN mg/dL 0.4   ALK PHOS U/L 132*   AST (SGOT) U/L 26   ALT (SGPT) U/L 5     Results from last 7 days   Lab Units 03/23/25  0556 03/22/25  0650 03/22/25  0042   CALCIUM mg/dL 8.4* 9.1 9.0   ALBUMIN g/dL  --   --  3.8   MAGNESIUM mg/dL  --   --  2.2         Coag     HbA1C   Lab Results   Component Value Date    HGBA1C 8.30 (H) 03/12/2025    HGBA1C 8.10 (H) 10/13/2024    HGBA1C 9.2 (H) 08/24/2024     Infection     Radiology(recent) XR Hips Bilateral With or Without Pelvis 2 View  Result Date: 3/23/2025   No evidence of acute bony abnormality.   This report was finalized on 3/23/2025 5:09 PM by Dr. Haider Carlson M.D on Workstation: [id number]      No results found for: \"TROPONINT\", \"TROPONINI\", \"BNP\"  No components found for: \"TSH;2\"    aspirin, 81 mg, Oral, Daily  carbidopa-levodopa, 1.5 tablet, Oral, 4x Daily  carbidopa-levodopa CR, 1 tablet, Oral, Nightly  enoxaparin sodium, 40 mg, Subcutaneous, Q24H  gabapentin, 300 mg, Oral, BID  glucagon (human recombinant), 1 mg, Intravenous, Once  insulin lispro, 2-7 Units, Subcutaneous, 4x Daily AC & at Bedtime  LORazepam, 1 mg, Intravenous, Once  miconazole, 1 Application, Topical, BID  mirtazapine, 45 mg, Oral, Nightly  polyethylene glycol, 17 g, Oral, BID  sennosides-docusate, 1 tablet, Oral, BID  sertraline, 75 mg, Oral, Daily  sodium chloride, 10 mL, Intravenous, Q12H  vitamin B-12, 1,000 mcg, Oral, Daily       Diet: Diabetic; Consistent Carbohydrate; Fluid Consistency: Thin (IDDSI 0)      Assessment & Plan      Active Hospital Problems    Diagnosis  POA    **Altered mental status [R41.82]  Yes    Essential (primary) hypertension [I10]  Unknown    Type 2 diabetes mellitus with hyperglycemia, with long-term current use of insulin [E11.65, Z79.4]  Not Applicable    Parkinson disease [G20.A1]  Yes    " Dementia [F03.90]  Yes    Chronic indwelling Holland catheter [Z97.8]  Not Applicable      Resolved Hospital Problems   No resolved problems to display.     Mr. Hatch is a 74 y.o. male with a history of hyperlipidemia, DM, hypertension, CAD, BPH, anxiety, Parkinson's. He was recently here 3/12-3/15 for constipation and T11 vertebral fracture. DANNY recommended TLSO brace. Treated for UTI vs asymptomatic bacturia.     Came in with confusion for a few days. Mild SHALOM which is better. Confusion seems better. Could be SHALOM/dehydration vs polypharmacy.    States he's had worsening back pain and bilateral leg numbness and weakness. With recent T11 fracture will obtain MRI T/L spine. Pelvic/hip xray negative. Ordered Ativan to help him sit through this    Dispo- back to SNF depending on MRI result        Reviewed records      Jez Diaz MD  [facility]  03/24/25  14:32 EDT

## 2025-03-24 NOTE — PLAN OF CARE
Goal Outcome Evaluation:      No change. VS remained within normal limits and no decrease in orientation. Complaints of pain and nausea relieved by PRN meds.

## 2025-03-25 PROBLEM — Z66 DNR (DO NOT RESUSCITATE): Status: ACTIVE | Noted: 2025-03-25

## 2025-03-25 LAB
GLUCOSE BLDC GLUCOMTR-MCNC: 125 MG/DL (ref 70–130)
GLUCOSE BLDC GLUCOMTR-MCNC: 131 MG/DL (ref 70–130)
GLUCOSE BLDC GLUCOMTR-MCNC: 133 MG/DL (ref 70–130)
GLUCOSE BLDC GLUCOMTR-MCNC: 187 MG/DL (ref 70–130)

## 2025-03-25 PROCEDURE — 87481 CANDIDA DNA AMP PROBE: CPT | Performed by: INTERNAL MEDICINE

## 2025-03-25 PROCEDURE — 82948 REAGENT STRIP/BLOOD GLUCOSE: CPT

## 2025-03-25 PROCEDURE — 97530 THERAPEUTIC ACTIVITIES: CPT

## 2025-03-25 PROCEDURE — 25010000002 ENOXAPARIN PER 10 MG: Performed by: INTERNAL MEDICINE

## 2025-03-25 PROCEDURE — 25810000003 SODIUM CHLORIDE 0.9 % SOLUTION: Performed by: INTERNAL MEDICINE

## 2025-03-25 RX ORDER — SODIUM CHLORIDE 9 MG/ML
75 INJECTION, SOLUTION INTRAVENOUS CONTINUOUS
Status: ACTIVE | OUTPATIENT
Start: 2025-03-25 | End: 2025-03-26

## 2025-03-25 RX ORDER — SODIUM CHLORIDE 9 MG/ML
100 INJECTION, SOLUTION INTRAVENOUS CONTINUOUS
Status: ACTIVE | OUTPATIENT
Start: 2025-03-25 | End: 2025-03-25

## 2025-03-25 RX ADMIN — CYCLOBENZAPRINE HYDROCHLORIDE 10 MG: 10 TABLET, FILM COATED ORAL at 21:11

## 2025-03-25 RX ADMIN — CARBIDOPA AND LEVODOPA 1 TABLET: 50; 200 TABLET, EXTENDED RELEASE ORAL at 20:41

## 2025-03-25 RX ADMIN — CARBIDOPA AND LEVODOPA 1.5 TABLET: 25; 250 TABLET ORAL at 20:41

## 2025-03-25 RX ADMIN — SODIUM CHLORIDE 100 ML/HR: 9 INJECTION, SOLUTION INTRAVENOUS at 09:45

## 2025-03-25 RX ADMIN — Medication 1000 MCG: at 11:15

## 2025-03-25 RX ADMIN — SENNOSIDES AND DOCUSATE SODIUM 1 TABLET: 50; 8.6 TABLET ORAL at 20:41

## 2025-03-25 RX ADMIN — MIRTAZAPINE 45 MG: 15 TABLET, FILM COATED ORAL at 20:41

## 2025-03-25 RX ADMIN — ASPIRIN 81 MG: 81 TABLET, COATED ORAL at 11:15

## 2025-03-25 RX ADMIN — MICONAZOLE NITRATE 1 APPLICATION: 20 CREAM TOPICAL at 20:42

## 2025-03-25 RX ADMIN — CARBIDOPA AND LEVODOPA 1.5 TABLET: 25; 250 TABLET ORAL at 12:30

## 2025-03-25 RX ADMIN — ENOXAPARIN SODIUM 40 MG: 100 INJECTION SUBCUTANEOUS at 17:13

## 2025-03-25 RX ADMIN — SENNOSIDES AND DOCUSATE SODIUM 1 TABLET: 50; 8.6 TABLET ORAL at 11:17

## 2025-03-25 RX ADMIN — POLYETHYLENE GLYCOL 3350 17 G: 17 POWDER, FOR SOLUTION ORAL at 20:40

## 2025-03-25 RX ADMIN — GABAPENTIN 300 MG: 300 CAPSULE ORAL at 11:15

## 2025-03-25 RX ADMIN — CARBIDOPA AND LEVODOPA 1.5 TABLET: 25; 250 TABLET ORAL at 17:13

## 2025-03-25 RX ADMIN — Medication 10 ML: at 20:42

## 2025-03-25 RX ADMIN — SERTRALINE HYDROCHLORIDE 75 MG: 50 TABLET, FILM COATED ORAL at 11:14

## 2025-03-25 RX ADMIN — POLYETHYLENE GLYCOL 3350 17 G: 17 POWDER, FOR SOLUTION ORAL at 11:17

## 2025-03-25 RX ADMIN — MICONAZOLE NITRATE 1 APPLICATION: 20 CREAM TOPICAL at 11:16

## 2025-03-25 RX ADMIN — GABAPENTIN 300 MG: 300 CAPSULE ORAL at 20:41

## 2025-03-25 RX ADMIN — SODIUM CHLORIDE 75 ML/HR: 9 INJECTION, SOLUTION INTRAVENOUS at 22:57

## 2025-03-25 RX ADMIN — Medication 10 ML: at 09:39

## 2025-03-25 NOTE — PLAN OF CARE
Goal Outcome Evaluation:  Plan of Care Reviewed With: patient           Outcome Evaluation: Upon entering room, pt supine in bed and drowsy but agreeable to PT this AM. Performed bed mobility with MaxA/Dep. x2 with cueing for logroll technique. Pt reported L burning hip pain while perofmring this. Once EOB, pt required MaxA x1 for sitting balance d/t heavy post. lean but SBA when cued to use UE's for support. Performed UE and LE exercises to address strength and ROM deficits. D/t pt's unsteadiness with sitting, standing was not attempted this AM for safety reasons. Anticipate d/c to SNF when medically cleared. Will continue to follow.    Anticipated Discharge Disposition (PT): skilled nursing facility

## 2025-03-25 NOTE — CASE MANAGEMENT/SOCIAL WORK
Discharge Planning Assessment  [facility]     Patient Name: Singh Hatch  MRN: [id number]  Today's Date: 3/25/2025    Admit Date: 3/22/2025    Plan: Plans return to [facility]/Medicaid bed hold.   Discharge Needs Assessment       Row Name 03/25/25 1602       Living Environment    People in Home facility resident    Name(s) of People in Home [facility] LT/Medicaid bed hold    Current Living Arrangements extended care facility    Potentially Unsafe Housing Conditions none    In the past 12 months has the electric, gas, oil, or water company threatened to shut off services in your home? No    Primary Care Provided by other (see comments)  staff at [facility]    Family Caregiver if Needed sibling(s)    Family Caregiver Names Mario Hatch/brother [phone]  Yasmin Calvoeleno/sister [phone]    Quality of Family Relationships involved    Able to Return to Prior Arrangements yes    Living Arrangement Comments Plans return to [facility]/Medicaid bed hold       Resource/Environmental Concerns    Resource/Environmental Concerns none    Transportation Concerns none       Transportation Needs    In the past 12 months, has lack of transportation kept you from medical appointments or from getting medications? no    In the past 12 months, has lack of transportation kept you from meetings, work, or from getting things needed for daily living? No       Food Insecurity    Within the past 12 months, you worried that your food would run out before you got the money to buy more. Never true    Within the past 12 months, the food you bought just didn't last and you didn't have money to get more. Never true       Transition Planning    Patient/Family Anticipates Transition to long-term care facility    Patient/Family Anticipated Services at Transition     Transportation Anticipated health plan transportation       Discharge Needs Assessment    Readmission Within the Last 30 Days current  reason for admission unrelated to previous admission    Equipment Currently Used at Home hospital bed;wheelchair;walker, standard;shower chair;grab bar    Concerns to be Addressed discharge planning    Equipment Needed After Discharge none    Discharge Facility/Level of Care Needs nursing facility, intermediate    Provided Post Acute Provider List? N/A    Provided Post Acute Provider Quality & Resource List? N/A    Patient's Choice of Community Agency(s) Admitted from [facility]                   Discharge Plan       Row Name 03/25/25 3713       Plan    Plan Plans return to [facility] LTC/Medicaid bed hold.    Patient/Family in Agreement with Plan yes    Plan Comments Call placed to patient's sister Yasmin to complete initial assessment. Confirmed information on facesheet. PCP: Salomon Mcknight MD and Pharmacy: [facility]. Patient admitted from [facility] SNF and family says plan is to return. Received call from Kevin stating patient is long-term care/Medicaid bed hold and is able to return. Packet: office  Pharmacy: updated  Transport: need to arrange. Continue to follow....[facility]                  Continued Care and Services - Admitted Since 3/22/2025       Destination Coordination complete.      Service Provider Request Status Services Address Phone Fax Patient Preferred    [facility]  Selected Intermediate Care [address] [address] [phone] --                  Selected Continued Care - Prior Encounters Includes continued care and service providers with selected services from prior encounters from 12/22/2024 to 3/25/2025      Discharged on 3/15/2025 Admission date: 3/12/2025 - Discharge disposition: Intermediate Care       Destination       Service Provider Services Address Phone Fax Patient Preferred    [facility] Intermediate Care [address] [id number] [phone] --                           Expected Discharge Date and Time       Expected Discharge Date Expected Discharge Time    Mar 26, 2025            Demographic Summary    No documentation.                  Functional Status    No documentation.                  Psychosocial    No documentation.                  Abuse/Neglect    No documentation.                  Legal    No documentation.                  Substance Abuse    No documentation.                  Patient Forms    No documentation.                     Viki Mcelroy RN

## 2025-03-25 NOTE — PROGRESS NOTES
Name: Singh Hatch ADMIT: 3/22/2025   : 1950  PCP: Salomon Mcknight MD    MRN: [id number] LOS: 0 days   AGE/SEX: 74 y.o. male  ROOM: [facility]   Subjective   Chief Complaint   Patient presents with    Altered Mental Status     Mental status up and down- a little sleepy this morning   BP is controlled  H/o parkinsons  States numbness to legs bilateral x 1 month  H/o constipation  Unable to tolerate MRI with ativan    ROS  No f/c  No n/v  No cp/palp  No soa/cough    Objective   Vital Signs  Temp:  [97.5 °F (36.4 °C)-98.1 °F (36.7 °C)] 98 °F (36.7 °C)  Heart Rate:  [] 93  Resp:  [16-18] 18  BP: (120-148)/(60-88) 121/76  SpO2:  [96 %-100 %] 98 %  on   ;   Device (Oxygen Therapy): room air  Body mass index is 20.23 kg/m².    Physical Exam  HENT:      Head: Normocephalic and atraumatic.   Eyes:      General: No scleral icterus.  Cardiovascular:      Rate and Rhythm: Normal rate and regular rhythm.      Heart sounds: Normal heart sounds.   Pulmonary:      Effort: Pulmonary effort is normal. No respiratory distress.      Breath sounds: Normal breath sounds.   Abdominal:      General: There is no distension.      Palpations: Abdomen is soft.      Tenderness: There is no abdominal tenderness.   Musculoskeletal:      Cervical back: Neck supple.   Psychiatric:         Behavior: Behavior normal.     Elderly  Chronically ill  +LE numbness and weakness  Awake but drowsy this morning     Results Review:       I reviewed the patient's new clinical results.  Results from last 7 days   Lab Units 25  0556 25  0650 25  0042   WBC 10*3/mm3 8.59 9.19 13.34*   HEMOGLOBIN g/dL 11.8* 14.0 13.9   PLATELETS 10*3/mm3 229 244 293     Results from last 7 days   Lab Units 25  0556 25  0650 25  0042   SODIUM mmol/L 141 139 141   POTASSIUM mmol/L 4.3 4.7 4.7   CHLORIDE mmol/L 110* 107 105   CO2 mmol/L 22.0 20.6* 24.6   BUN mg/dL 28* 36* 40*   CREATININE mg/dL 1.03 1.42* 1.51*   GLUCOSE mg/dL  "101* 85 108*   Estimated Creatinine Clearance: 60.3 mL/min (by C-G formula based on SCr of 1.03 mg/dL).  Results from last 7 days   Lab Units 03/22/25  0042   ALBUMIN g/dL 3.8   BILIRUBIN mg/dL 0.4   ALK PHOS U/L 132*   AST (SGOT) U/L 26   ALT (SGPT) U/L 5     Results from last 7 days   Lab Units 03/23/25  0556 03/22/25  0650 03/22/25  0042   CALCIUM mg/dL 8.4* 9.1 9.0   ALBUMIN g/dL  --   --  3.8   MAGNESIUM mg/dL  --   --  2.2         Coag     HbA1C   Lab Results   Component Value Date    HGBA1C 8.30 (H) 03/12/2025    HGBA1C 8.10 (H) 10/13/2024    HGBA1C 9.2 (H) 08/24/2024     Infection     Radiology(recent) XR Hips Bilateral With or Without Pelvis 2 View  Result Date: 3/23/2025   No evidence of acute bony abnormality.   This report was finalized on 3/23/2025 5:09 PM by Dr. Haider Carlson M.D on Workstation: [id number]      No results found for: \"TROPONINT\", \"TROPONINI\", \"BNP\"  No components found for: \"TSH;2\"    aspirin, 81 mg, Oral, Daily  carbidopa-levodopa, 1.5 tablet, Oral, 4x Daily  carbidopa-levodopa CR, 1 tablet, Oral, Nightly  enoxaparin sodium, 40 mg, Subcutaneous, Q24H  gabapentin, 300 mg, Oral, BID  glucagon (human recombinant), 1 mg, Intravenous, Once  insulin lispro, 2-7 Units, Subcutaneous, 4x Daily AC & at Bedtime  LORazepam, 1 mg, Intravenous, Once  miconazole, 1 Application, Topical, BID  mirtazapine, 45 mg, Oral, Nightly  polyethylene glycol, 17 g, Oral, BID  sennosides-docusate, 1 tablet, Oral, BID  sertraline, 75 mg, Oral, Daily  sodium chloride, 10 mL, Intravenous, Q12H  vitamin B-12, 1,000 mcg, Oral, Daily      sodium chloride, 100 mL/hr, Last Rate: Stopped (03/25/25 1215)  sodium chloride, 75 mL/hr    Diet: Diabetic; Consistent Carbohydrate; Fluid Consistency: Thin (IDDSI 0)  NPO Diet NPO Type: Strict NPO      Assessment & Plan      Active Hospital Problems    Diagnosis  POA    **Altered mental status [R41.82]  Yes    Essential (primary) hypertension [I10]  Unknown    Type 2 diabetes " mellitus with hyperglycemia, with long-term current use of insulin [E11.65, Z79.4]  Not Applicable    Parkinson disease [G20.A1]  Yes    Dementia [F03.90]  Yes    Chronic indwelling Holland catheter [Z97.8]  Not Applicable      Resolved Hospital Problems   No resolved problems to display.     Mr. Hatch is a 74 y.o. male with a history of hyperlipidemia, DM, hypertension, CAD, BPH, anxiety, Parkinson's. He was recently here 3/12-3/15 for constipation and T11 vertebral fracture. DANNY recommended TLSO brace. Treated for UTI vs asymptomatic bacturia.     Came in with confusion for a few days. Mild SHALOM which is better. Confusion seems better. Could be SHALOM/dehydration vs polypharmacy. Had stopped fluids but will restart with NPO status for MRI with anesthesia     TSH normal 2 weeks ago    States he's had worsening back pain and bilateral leg numbness and weakness. With recent T11 fracture will obtain MRI T/L spine. Pelvic/hip xray negative. Ordered Ativan to help him sit through this but still not able to tolerate. To have MRI with anesthesia, scheduled for 3/26.     Dispo- back to SNF depending on MRI result    Addendum  Discussed with patients sister. She is discussing further with family but may not want her brother to have the MRI with anesthesia. Discussed with her that he was unable to get the MRI with ativan. She stated that he is likely nearing the end of his life with his parkinsons and that the would not pursue any surgery based on the MRI. That is certainly reasonable. Will hear back on their final decision regarding MRI.    Also discussed code status - she states DNR. And also I reviewed his EMS DNR form so have updated code status to DNR/DNI.     Discussed with her that though he is not acutely ill he is likely nearing the final chapters of his life so that if he has further decline at SNF or needing to be hospitalized that may need to consider palliative/hospice care at that point.     Reviewed  records      Jez Diaz MD  [facility]  03/25/25  14:32 EDT

## 2025-03-25 NOTE — PLAN OF CARE
Problem: Adult Inpatient Plan of Care  Goal: Plan of Care Review  Outcome: Progressing  Flowsheets (Taken 3/25/2025 1816)  Progress: no change  Plan of Care Reviewed With: patient  Goal: Patient-Specific Goal (Individualized)  Outcome: Progressing  Goal: Absence of Hospital-Acquired Illness or Injury  Outcome: Progressing  Intervention: Identify and Manage Fall Risk  Recent Flowsheet Documentation  Taken 3/25/2025 1815 by Rina Lezama RN  Safety Promotion/Fall Prevention:   activity supervised   assistive device/personal items within reach   clutter free environment maintained   fall prevention program maintained   nonskid shoes/slippers when out of bed   safety round/check completed   room organization consistent  Taken 3/25/2025 1615 by Rina Lezama RN  Safety Promotion/Fall Prevention:   activity supervised   assistive device/personal items within reach   clutter free environment maintained   fall prevention program maintained   nonskid shoes/slippers when out of bed   safety round/check completed   room organization consistent  Taken 3/25/2025 1415 by Rina Lezama RN  Safety Promotion/Fall Prevention:   activity supervised   assistive device/personal items within reach   clutter free environment maintained   fall prevention program maintained   nonskid shoes/slippers when out of bed   safety round/check completed   room organization consistent  Taken 3/25/2025 1215 by Rina Lezama RN  Safety Promotion/Fall Prevention:   activity supervised   assistive device/personal items within reach   clutter free environment maintained   fall prevention program maintained   nonskid shoes/slippers when out of bed   safety round/check completed   room organization consistent  Taken 3/25/2025 1015 by Rina Lezama RN  Safety Promotion/Fall Prevention:   activity supervised   assistive device/personal items within reach   clutter free environment maintained   fall prevention program maintained    nonskid shoes/slippers when out of bed   safety round/check completed   room organization consistent  Taken 3/25/2025 0815 by Rina Lezama RN  Safety Promotion/Fall Prevention:   activity supervised   assistive device/personal items within reach   clutter free environment maintained   fall prevention program maintained   nonskid shoes/slippers when out of bed   safety round/check completed   room organization consistent  Intervention: Prevent Skin Injury  Recent Flowsheet Documentation  Taken 3/25/2025 0815 by Rina Lezama RN  Body Position:   supine   weight shifting  Intervention: Prevent Infection  Recent Flowsheet Documentation  Taken 3/25/2025 1815 by Rina Lezama RN  Infection Prevention:   single patient room provided   hand hygiene promoted  Taken 3/25/2025 1615 by Rina Lezama RN  Infection Prevention:   single patient room provided   hand hygiene promoted  Taken 3/25/2025 1415 by Rina Lezama RN  Infection Prevention:   single patient room provided   hand hygiene promoted  Taken 3/25/2025 1215 by Rina Lezama RN  Infection Prevention:   single patient room provided   hand hygiene promoted  Taken 3/25/2025 1015 by Rina Lezama RN  Infection Prevention:   single patient room provided   hand hygiene promoted  Taken 3/25/2025 0815 by Rina Lezama RN  Infection Prevention:   single patient room provided   hand hygiene promoted  Goal: Optimal Comfort and Wellbeing  Outcome: Progressing  Intervention: Provide Person-Centered Care  Recent Flowsheet Documentation  Taken 3/25/2025 0815 by Rina Lezama RN  Trust Relationship/Rapport:   care explained   thoughts/feelings acknowledged  Goal: Readiness for Transition of Care  Outcome: Progressing     Problem: Fall Injury Risk  Goal: Absence of Fall and Fall-Related Injury  Outcome: Progressing  Intervention: Identify and Manage Contributors  Recent Flowsheet Documentation  Taken 3/25/2025 0815 by Rina Lezama  RN  Medication Review/Management: medications reviewed  Intervention: Promote Injury-Free Environment  Recent Flowsheet Documentation  Taken 3/25/2025 1815 by Rina Lezama RN  Safety Promotion/Fall Prevention:   activity supervised   assistive device/personal items within reach   clutter free environment maintained   fall prevention program maintained   nonskid shoes/slippers when out of bed   safety round/check completed   room organization consistent  Taken 3/25/2025 1615 by Rina Lezama RN  Safety Promotion/Fall Prevention:   activity supervised   assistive device/personal items within reach   clutter free environment maintained   fall prevention program maintained   nonskid shoes/slippers when out of bed   safety round/check completed   room organization consistent  Taken 3/25/2025 1415 by Rina Lezama RN  Safety Promotion/Fall Prevention:   activity supervised   assistive device/personal items within reach   clutter free environment maintained   fall prevention program maintained   nonskid shoes/slippers when out of bed   safety round/check completed   room organization consistent  Taken 3/25/2025 1215 by Rina Lezama RN  Safety Promotion/Fall Prevention:   activity supervised   assistive device/personal items within reach   clutter free environment maintained   fall prevention program maintained   nonskid shoes/slippers when out of bed   safety round/check completed   room organization consistent  Taken 3/25/2025 1015 by Rina Lezama RN  Safety Promotion/Fall Prevention:   activity supervised   assistive device/personal items within reach   clutter free environment maintained   fall prevention program maintained   nonskid shoes/slippers when out of bed   safety round/check completed   room organization consistent  Taken 3/25/2025 0815 by Rina Lezama RN  Safety Promotion/Fall Prevention:   activity supervised   assistive device/personal items within reach   clutter free  environment maintained   fall prevention program maintained   nonskid shoes/slippers when out of bed   safety round/check completed   room organization consistent     Problem: Skin Injury Risk Increased  Goal: Skin Health and Integrity  Outcome: Progressing  Intervention: Optimize Skin Protection  Recent Flowsheet Documentation  Taken 3/25/2025 0815 by Rina Lezama RN  Activity Management: activity minimized  Head of Bed (HOB) Positioning: HOB at 20-30 degrees     Problem: Violence Risk or Actual  Goal: Anger and Impulse Control  Outcome: Progressing  Intervention: Minimize Safety Risk  Recent Flowsheet Documentation  Taken 3/25/2025 1815 by Rina Lezama RN  De-Escalation Techniques: stimulation decreased  Enhanced Safety Measures: bed alarm set  Taken 3/25/2025 1615 by Rina Lezama RN  De-Escalation Techniques: stimulation decreased  Enhanced Safety Measures: bed alarm set  Taken 3/25/2025 1415 by Rina Lezama RN  De-Escalation Techniques: stimulation decreased  Enhanced Safety Measures: bed alarm set  Taken 3/25/2025 1215 by Rina Lezama RN  De-Escalation Techniques: stimulation decreased  Enhanced Safety Measures: bed alarm set  Taken 3/25/2025 1015 by Rina Lezama RN  De-Escalation Techniques: stimulation decreased  Enhanced Safety Measures: bed alarm set  Taken 3/25/2025 0815 by Rina Lezama RN  De-Escalation Techniques: stimulation decreased  Enhanced Safety Measures: bed alarm set   Goal Outcome Evaluation:  Plan of Care Reviewed With: patient        Progress: no change

## 2025-03-25 NOTE — DISCHARGE PLACEMENT REQUEST
"Dimitri Webber (74 y.o. Male)       Date of Birth   1950    Social Security Number       Address   [address] [address]    Home Phone   [phone]    MRN   [id number]       Nondenominational   Taoist    Marital Status                               Admission Date   3/22/2025    Admission Type   Emergency    Admitting Provider   Jaime Cronin MD    Attending Provider   Jez Diaz MD    Department, Room/Bed   [address], P585/1       Discharge Date       Discharge Disposition       Discharge Destination                                 Attending Provider: Jez Diaz MD    Allergies: No Known Allergies    Isolation: Contact   Infection: Candida Auris (rule out) (03/24/25), MRSA/History Only (03/25/25)   Code Status: No CPR    Ht: 180.3 cm (71\")   Wt: 65.8 kg (145 lb 1 oz)    Admission Cmt: None   Principal Problem: Altered mental status [R41.82]                   Active Insurance as of 3/22/2025       Primary Coverage       Payor Plan Insurance Group Employer/Plan Group    MEDICARE MEDICARE A & B        Payor Plan Address Payor Plan Phone Number Payor Plan Fax Number Effective Dates    [address] [phone]  10/1/2005 - None Entered    [facility] [id number]         Subscriber Name Subscriber Birth Date Member ID       DIMITRI WEBBER 1950 [id number]               Secondary Coverage       Payor Plan Insurance Group Employer/Plan Group    [place]        Payor Plan Address Payor Plan Phone Number Payor Plan Fax Number Effective Dates    [address] [phone]  3/12/2025 - None Entered    [place] [address]         Subscriber Name Subscriber Birth Date Member ID       DIMITRI WEBBER 1950 [id number]                     Emergency Contacts        (Rel.) Home Phone Work Phone Mobile Phone    MamieMario darby (Brother) [phone] -- [phone]    Yasmin Lombardi " (Sister) [phone] -- [phone]

## 2025-03-25 NOTE — PLAN OF CARE
Problem: Adult Inpatient Plan of Care  Goal: Plan of Care Review  Outcome: Progressing  Flowsheets (Taken 3/25/2025 0406)  Progress: no change  Goal: Patient-Specific Goal (Individualized)  Outcome: Progressing  Goal: Absence of Hospital-Acquired Illness or Injury  Outcome: Progressing  Intervention: Identify and Manage Fall Risk  Recent Flowsheet Documentation  Taken 3/25/2025 0200 by Key Sr RN  Safety Promotion/Fall Prevention:   room organization consistent   safety round/check completed  Taken 3/25/2025 0000 by Key Sr RN  Safety Promotion/Fall Prevention:   activity supervised   assistive device/personal items within reach   clutter free environment maintained   room organization consistent   safety round/check completed  Taken 3/24/2025 2200 by Key Sr RN  Safety Promotion/Fall Prevention:   activity supervised   assistive device/personal items within reach   clutter free environment maintained   room organization consistent   safety round/check completed  Taken 3/24/2025 2000 by Key Sr RN  Safety Promotion/Fall Prevention:   activity supervised   assistive device/personal items within reach   clutter free environment maintained   nonskid shoes/slippers when out of bed   room organization consistent   safety round/check completed  Intervention: Prevent Skin Injury  Recent Flowsheet Documentation  Taken 3/25/2025 0200 by Key Sr RN  Body Position: tilted  Taken 3/24/2025 2200 by Key Sr RN  Body Position: supine  Skin Protection:   incontinence pads utilized   transparent dressing maintained  Taken 3/24/2025 2000 by Key Sr RN  Body Position:   legs elevated   tilted  Skin Protection:   incontinence pads utilized   skin sealant/moisture barrier applied   transparent dressing maintained  Intervention: Prevent and Manage VTE (Venous Thromboembolism) Risk  Recent Flowsheet Documentation  Taken 3/24/2025 2000 by  Key Sr RN  VTE Prevention/Management:   bilateral   SCDs (sequential compression devices) on  Goal: Optimal Comfort and Wellbeing  Outcome: Progressing  Intervention: Provide Person-Centered Care  Recent Flowsheet Documentation  Taken 3/24/2025 2000 by Key Sr RN  Trust Relationship/Rapport:   care explained   choices provided   emotional support provided   empathic listening provided   questions answered   questions encouraged   reassurance provided   thoughts/feelings acknowledged  Goal: Readiness for Transition of Care  Outcome: Progressing     Problem: Fall Injury Risk  Goal: Absence of Fall and Fall-Related Injury  Outcome: Progressing  Intervention: Identify and Manage Contributors  Recent Flowsheet Documentation  Taken 3/25/2025 0200 by Key Sr RN  Medication Review/Management: medications reviewed  Self-Care Promotion:   independence encouraged   BADL personal objects within reach  Taken 3/25/2025 0000 by Key Sr RN  Medication Review/Management: medications reviewed  Self-Care Promotion: independence encouraged  Taken 3/24/2025 2200 by Key Sr RN  Medication Review/Management: medications reviewed  Self-Care Promotion: independence encouraged  Taken 3/24/2025 2000 by Key Sr RN  Medication Review/Management: medications reviewed  Self-Care Promotion:   independence encouraged   BADL personal objects within reach  Intervention: Promote Injury-Free Environment  Recent Flowsheet Documentation  Taken 3/25/2025 0200 by Key Sr, RN  Safety Promotion/Fall Prevention:   room organization consistent   safety round/check completed  Taken 3/25/2025 0000 by Key Sr RN  Safety Promotion/Fall Prevention:   activity supervised   assistive device/personal items within reach   clutter free environment maintained   room organization consistent   safety round/check completed  Taken 3/24/2025 2200 by Key Sr  RN  Safety Promotion/Fall Prevention:   activity supervised   assistive device/personal items within reach   clutter free environment maintained   room organization consistent   safety round/check completed  Taken 3/24/2025 2000 by Key Sr RN  Safety Promotion/Fall Prevention:   activity supervised   assistive device/personal items within reach   clutter free environment maintained   nonskid shoes/slippers when out of bed   room organization consistent   safety round/check completed     Problem: Skin Injury Risk Increased  Goal: Skin Health and Integrity  Outcome: Progressing  Intervention: Optimize Skin Protection  Recent Flowsheet Documentation  Taken 3/25/2025 0200 by Key Sr RN  Head of Bed (HOB) Positioning: HOB at 20-30 degrees  Taken 3/25/2025 0000 by Key rS RN  Head of Bed (HOB) Positioning: HOB at 20-30 degrees  Taken 3/24/2025 2200 by Key Sr RN  Pressure Reduction Techniques:   positioned off wounds   heels elevated off bed   weight shift assistance provided  Head of Bed (HOB) Positioning: HOB at 20-30 degrees  Pressure Reduction Devices: positioning supports utilized  Skin Protection:   incontinence pads utilized   transparent dressing maintained  Taken 3/24/2025 2000 by Key Sr RN  Pressure Reduction Techniques:   positioned off wounds   pressure points protected   heels elevated off bed   weight shift assistance provided  Head of Bed (HOB) Positioning: HOB at 20-30 degrees  Pressure Reduction Devices: positioning supports utilized  Skin Protection:   incontinence pads utilized   skin sealant/moisture barrier applied   transparent dressing maintained     Problem: Violence Risk or Actual  Goal: Anger and Impulse Control  Outcome: Progressing  Intervention: Minimize Safety Risk  Recent Flowsheet Documentation  Taken 3/25/2025 0200 by Key Sr RN  Enhanced Safety Measures: room near unit station  Taken 3/25/2025 0000 by  Key Sr, RN  Enhanced Safety Measures: bed alarm set  Taken 3/24/2025 2200 by Key Sr, RN  Enhanced Safety Measures:   bed alarm set   room near unit station  Taken 3/24/2025 2000 by Key Sr, RN  Sensory Stimulation Regulation: care clustered  De-Escalation Techniques:   reoriented   quiet time facilitated   increased round frequency  Enhanced Safety Measures:   bed alarm set   room near unit station  Intervention: Promote Self-Control  Recent Flowsheet Documentation  Taken 3/24/2025 2000 by Key Sr, RN  Supportive Measures: positive reinforcement provided   Goal Outcome Evaluation:           Progress: no change

## 2025-03-26 ENCOUNTER — ANESTHESIA EVENT (OUTPATIENT)
Dept: MRI IMAGING | Facility: HOSPITAL | Age: 75
End: 2025-03-26
Payer: MEDICARE

## 2025-03-26 ENCOUNTER — ANESTHESIA (OUTPATIENT)
Dept: MRI IMAGING | Facility: HOSPITAL | Age: 75
End: 2025-03-26
Payer: MEDICARE

## 2025-03-26 ENCOUNTER — APPOINTMENT (OUTPATIENT)
Dept: MRI IMAGING | Facility: HOSPITAL | Age: 75
DRG: 682 | End: 2025-03-26
Payer: MEDICARE

## 2025-03-26 LAB
ANION GAP SERPL CALCULATED.3IONS-SCNC: 11 MMOL/L (ref 5–15)
BUN SERPL-MCNC: 22 MG/DL (ref 8–23)
BUN/CREAT SERPL: 25.9 (ref 7–25)
C AURIS DNA SPEC QL NAA+NON-PROBE: NOT DETECTED
CALCIUM SPEC-SCNC: 8.6 MG/DL (ref 8.6–10.5)
CHLORIDE SERPL-SCNC: 106 MMOL/L (ref 98–107)
CO2 SERPL-SCNC: 23 MMOL/L (ref 22–29)
CREAT SERPL-MCNC: 0.85 MG/DL (ref 0.76–1.27)
DEPRECATED RDW RBC AUTO: 44.6 FL (ref 37–54)
EGFRCR SERPLBLD CKD-EPI 2021: 92.3 ML/MIN/1.73
ERYTHROCYTE [DISTWIDTH] IN BLOOD BY AUTOMATED COUNT: 13.3 % (ref 12.3–15.4)
GLUCOSE BLDC GLUCOMTR-MCNC: 143 MG/DL (ref 70–130)
GLUCOSE BLDC GLUCOMTR-MCNC: 143 MG/DL (ref 70–130)
GLUCOSE BLDC GLUCOMTR-MCNC: 158 MG/DL (ref 70–130)
GLUCOSE BLDC GLUCOMTR-MCNC: 198 MG/DL (ref 70–130)
GLUCOSE SERPL-MCNC: 178 MG/DL (ref 65–99)
HCT VFR BLD AUTO: 39 % (ref 37.5–51)
HGB BLD-MCNC: 13.1 G/DL (ref 13–17.7)
MCH RBC QN AUTO: 30.3 PG (ref 26.6–33)
MCHC RBC AUTO-ENTMCNC: 33.6 G/DL (ref 31.5–35.7)
MCV RBC AUTO: 90.3 FL (ref 79–97)
PLATELET # BLD AUTO: 282 10*3/MM3 (ref 140–450)
PMV BLD AUTO: 9.3 FL (ref 6–12)
POTASSIUM SERPL-SCNC: 4 MMOL/L (ref 3.5–5.2)
RBC # BLD AUTO: 4.32 10*6/MM3 (ref 4.14–5.8)
SODIUM SERPL-SCNC: 140 MMOL/L (ref 136–145)
WBC NRBC COR # BLD AUTO: 12.14 10*3/MM3 (ref 3.4–10.8)

## 2025-03-26 PROCEDURE — 25510000002 GADOBENATE DIMEGLUMINE 529 MG/ML SOLUTION: Performed by: HOSPITALIST

## 2025-03-26 PROCEDURE — 80048 BASIC METABOLIC PNL TOTAL CA: CPT | Performed by: INTERNAL MEDICINE

## 2025-03-26 PROCEDURE — 25010000002 ENOXAPARIN PER 10 MG: Performed by: INTERNAL MEDICINE

## 2025-03-26 PROCEDURE — 85027 COMPLETE CBC AUTOMATED: CPT | Performed by: INTERNAL MEDICINE

## 2025-03-26 PROCEDURE — 72157 MRI CHEST SPINE W/O & W/DYE: CPT

## 2025-03-26 PROCEDURE — 25010000002 PROPOFOL 10 MG/ML EMULSION: Performed by: ANESTHESIOLOGY

## 2025-03-26 PROCEDURE — 25010000002 LIDOCAINE 2% SOLUTION: Performed by: ANESTHESIOLOGY

## 2025-03-26 PROCEDURE — 63710000001 INSULIN LISPRO (HUMAN) PER 5 UNITS: Performed by: NURSE PRACTITIONER

## 2025-03-26 PROCEDURE — 25810000003 LACTATED RINGERS PER 1000 ML: Performed by: ANESTHESIOLOGY

## 2025-03-26 PROCEDURE — A9577 INJ MULTIHANCE: HCPCS | Performed by: HOSPITALIST

## 2025-03-26 PROCEDURE — 82948 REAGENT STRIP/BLOOD GLUCOSE: CPT

## 2025-03-26 PROCEDURE — 72158 MRI LUMBAR SPINE W/O & W/DYE: CPT

## 2025-03-26 PROCEDURE — 99221 1ST HOSP IP/OBS SF/LOW 40: CPT | Performed by: NURSE PRACTITIONER

## 2025-03-26 RX ORDER — HYDROCODONE BITARTRATE AND ACETAMINOPHEN 7.5; 325 MG/1; MG/1
1 TABLET ORAL EVERY 4 HOURS PRN
Refills: 0 | Status: DISCONTINUED | OUTPATIENT
Start: 2025-03-26 | End: 2025-03-26 | Stop reason: HOSPADM

## 2025-03-26 RX ORDER — FLUMAZENIL 0.1 MG/ML
0.2 INJECTION INTRAVENOUS AS NEEDED
Status: DISCONTINUED | OUTPATIENT
Start: 2025-03-26 | End: 2025-03-26 | Stop reason: HOSPADM

## 2025-03-26 RX ORDER — IPRATROPIUM BROMIDE AND ALBUTEROL SULFATE 2.5; .5 MG/3ML; MG/3ML
3 SOLUTION RESPIRATORY (INHALATION) ONCE AS NEEDED
Status: DISCONTINUED | OUTPATIENT
Start: 2025-03-26 | End: 2025-03-26 | Stop reason: HOSPADM

## 2025-03-26 RX ORDER — HYDRALAZINE HYDROCHLORIDE 20 MG/ML
5 INJECTION INTRAMUSCULAR; INTRAVENOUS
Status: DISCONTINUED | OUTPATIENT
Start: 2025-03-26 | End: 2025-03-26 | Stop reason: HOSPADM

## 2025-03-26 RX ORDER — SODIUM CHLORIDE, SODIUM LACTATE, POTASSIUM CHLORIDE, CALCIUM CHLORIDE 600; 310; 30; 20 MG/100ML; MG/100ML; MG/100ML; MG/100ML
INJECTION, SOLUTION INTRAVENOUS CONTINUOUS PRN
Status: DISCONTINUED | OUTPATIENT
Start: 2025-03-26 | End: 2025-03-26 | Stop reason: SURG

## 2025-03-26 RX ORDER — PROPOFOL 10 MG/ML
VIAL (ML) INTRAVENOUS AS NEEDED
Status: DISCONTINUED | OUTPATIENT
Start: 2025-03-26 | End: 2025-03-26 | Stop reason: SURG

## 2025-03-26 RX ORDER — HYDROCODONE BITARTRATE AND ACETAMINOPHEN 5; 325 MG/1; MG/1
1 TABLET ORAL ONCE AS NEEDED
Refills: 0 | Status: DISCONTINUED | OUTPATIENT
Start: 2025-03-26 | End: 2025-03-26 | Stop reason: HOSPADM

## 2025-03-26 RX ORDER — ONDANSETRON 2 MG/ML
4 INJECTION INTRAMUSCULAR; INTRAVENOUS ONCE AS NEEDED
Status: DISCONTINUED | OUTPATIENT
Start: 2025-03-26 | End: 2025-03-26 | Stop reason: HOSPADM

## 2025-03-26 RX ORDER — LABETALOL HYDROCHLORIDE 5 MG/ML
5 INJECTION, SOLUTION INTRAVENOUS
Status: DISCONTINUED | OUTPATIENT
Start: 2025-03-26 | End: 2025-03-26 | Stop reason: HOSPADM

## 2025-03-26 RX ORDER — HYDROMORPHONE HYDROCHLORIDE 1 MG/ML
0.25 INJECTION, SOLUTION INTRAMUSCULAR; INTRAVENOUS; SUBCUTANEOUS
Refills: 0 | Status: DISCONTINUED | OUTPATIENT
Start: 2025-03-26 | End: 2025-03-26 | Stop reason: HOSPADM

## 2025-03-26 RX ORDER — DIPHENHYDRAMINE HYDROCHLORIDE 50 MG/ML
12.5 INJECTION, SOLUTION INTRAMUSCULAR; INTRAVENOUS
Status: DISCONTINUED | OUTPATIENT
Start: 2025-03-26 | End: 2025-03-26 | Stop reason: HOSPADM

## 2025-03-26 RX ORDER — ATROPINE SULFATE 0.4 MG/ML
0.4 INJECTION, SOLUTION INTRAMUSCULAR; INTRAVENOUS; SUBCUTANEOUS ONCE AS NEEDED
Status: DISCONTINUED | OUTPATIENT
Start: 2025-03-26 | End: 2025-03-26 | Stop reason: HOSPADM

## 2025-03-26 RX ORDER — DROPERIDOL 2.5 MG/ML
0.62 INJECTION, SOLUTION INTRAMUSCULAR; INTRAVENOUS
Status: DISCONTINUED | OUTPATIENT
Start: 2025-03-26 | End: 2025-03-26 | Stop reason: HOSPADM

## 2025-03-26 RX ORDER — EPHEDRINE SULFATE 50 MG/ML
5 INJECTION, SOLUTION INTRAVENOUS ONCE AS NEEDED
Status: DISCONTINUED | OUTPATIENT
Start: 2025-03-26 | End: 2025-03-26 | Stop reason: HOSPADM

## 2025-03-26 RX ORDER — PROMETHAZINE HYDROCHLORIDE 25 MG/1
25 SUPPOSITORY RECTAL ONCE AS NEEDED
Status: DISCONTINUED | OUTPATIENT
Start: 2025-03-26 | End: 2025-03-26 | Stop reason: HOSPADM

## 2025-03-26 RX ORDER — NALOXONE HCL 0.4 MG/ML
0.2 VIAL (ML) INJECTION AS NEEDED
Status: DISCONTINUED | OUTPATIENT
Start: 2025-03-26 | End: 2025-03-26 | Stop reason: HOSPADM

## 2025-03-26 RX ORDER — FENTANYL CITRATE 50 UG/ML
25 INJECTION, SOLUTION INTRAMUSCULAR; INTRAVENOUS
Status: DISCONTINUED | OUTPATIENT
Start: 2025-03-26 | End: 2025-03-26 | Stop reason: HOSPADM

## 2025-03-26 RX ORDER — PROMETHAZINE HYDROCHLORIDE 25 MG/1
25 TABLET ORAL ONCE AS NEEDED
Status: DISCONTINUED | OUTPATIENT
Start: 2025-03-26 | End: 2025-03-26 | Stop reason: HOSPADM

## 2025-03-26 RX ORDER — LIDOCAINE HYDROCHLORIDE 20 MG/ML
INJECTION, SOLUTION INFILTRATION; PERINEURAL AS NEEDED
Status: DISCONTINUED | OUTPATIENT
Start: 2025-03-26 | End: 2025-03-26 | Stop reason: SURG

## 2025-03-26 RX ADMIN — Medication 1000 MCG: at 14:19

## 2025-03-26 RX ADMIN — SERTRALINE HYDROCHLORIDE 75 MG: 50 TABLET, FILM COATED ORAL at 14:20

## 2025-03-26 RX ADMIN — LIDOCAINE HYDROCHLORIDE 80 MG: 20 INJECTION, SOLUTION INFILTRATION; PERINEURAL at 10:24

## 2025-03-26 RX ADMIN — GABAPENTIN 300 MG: 300 CAPSULE ORAL at 21:16

## 2025-03-26 RX ADMIN — GADOBENATE DIMEGLUMINE 15 ML: 529 INJECTION, SOLUTION INTRAVENOUS at 14:07

## 2025-03-26 RX ADMIN — ENOXAPARIN SODIUM 40 MG: 100 INJECTION SUBCUTANEOUS at 17:35

## 2025-03-26 RX ADMIN — CARBIDOPA AND LEVODOPA 1.5 TABLET: 25; 250 TABLET ORAL at 14:20

## 2025-03-26 RX ADMIN — ASPIRIN 81 MG: 81 TABLET, COATED ORAL at 14:19

## 2025-03-26 RX ADMIN — POLYETHYLENE GLYCOL 3350 17 G: 17 POWDER, FOR SOLUTION ORAL at 14:20

## 2025-03-26 RX ADMIN — GABAPENTIN 300 MG: 300 CAPSULE ORAL at 14:20

## 2025-03-26 RX ADMIN — Medication 10 ML: at 21:16

## 2025-03-26 RX ADMIN — PROPOFOL 200 MG: 10 INJECTION, EMULSION INTRAVENOUS at 10:24

## 2025-03-26 RX ADMIN — MICONAZOLE NITRATE 1 APPLICATION: 20 CREAM TOPICAL at 14:21

## 2025-03-26 RX ADMIN — SODIUM CHLORIDE, POTASSIUM CHLORIDE, SODIUM LACTATE AND CALCIUM CHLORIDE: 600; 310; 30; 20 INJECTION, SOLUTION INTRAVENOUS at 09:57

## 2025-03-26 RX ADMIN — SENNOSIDES AND DOCUSATE SODIUM 1 TABLET: 50; 8.6 TABLET ORAL at 21:16

## 2025-03-26 RX ADMIN — Medication 10 ML: at 09:37

## 2025-03-26 RX ADMIN — INSULIN LISPRO 2 UNITS: 100 INJECTION, SOLUTION INTRAVENOUS; SUBCUTANEOUS at 21:17

## 2025-03-26 RX ADMIN — MIRTAZAPINE 45 MG: 15 TABLET, FILM COATED ORAL at 21:15

## 2025-03-26 RX ADMIN — POLYETHYLENE GLYCOL 3350 17 G: 17 POWDER, FOR SOLUTION ORAL at 21:16

## 2025-03-26 RX ADMIN — CARBIDOPA AND LEVODOPA 1.5 TABLET: 25; 250 TABLET ORAL at 17:35

## 2025-03-26 RX ADMIN — CYCLOBENZAPRINE HYDROCHLORIDE 10 MG: 10 TABLET, FILM COATED ORAL at 21:16

## 2025-03-26 RX ADMIN — SENNOSIDES AND DOCUSATE SODIUM 1 TABLET: 50; 8.6 TABLET ORAL at 14:20

## 2025-03-26 RX ADMIN — CARBIDOPA AND LEVODOPA 1 TABLET: 50; 200 TABLET, EXTENDED RELEASE ORAL at 21:15

## 2025-03-26 RX ADMIN — CARBIDOPA AND LEVODOPA 1.5 TABLET: 25; 250 TABLET ORAL at 21:16

## 2025-03-26 NOTE — PROGRESS NOTES
[facility]    [phone]   LOS: 1 day     Name: Singh Hatch  Age/Sex: 74 y.o. male  :  1950        PCP: Salomon Mcknight MD  Chief Complaint   Patient presents with    Altered Mental Status      Subjective   Still having some numbness and tingling in his legs and still does not feel great.  He did go down for the MRI with anesthesia and is a little sleepy following it.  He otherwise denies new issues or complaints.  General: No Fever or Chills, Cardiac: No Chest Pain or Palpitations, Resp: No Cough or SOA, GI: No Nausea, Vomiting, or Diarrhea, and Other: No bleeding    aspirin, 81 mg, Oral, Daily  carbidopa-levodopa, 1.5 tablet, Oral, 4x Daily  carbidopa-levodopa CR, 1 tablet, Oral, Nightly  enoxaparin sodium, 40 mg, Subcutaneous, Q24H  gabapentin, 300 mg, Oral, BID  glucagon (human recombinant), 1 mg, Intravenous, Once  insulin lispro, 2-7 Units, Subcutaneous, 4x Daily AC & at Bedtime  LORazepam, 1 mg, Intravenous, Once  miconazole, 1 Application, Topical, BID  mirtazapine, 45 mg, Oral, Nightly  polyethylene glycol, 17 g, Oral, BID  sennosides-docusate, 1 tablet, Oral, BID  sertraline, 75 mg, Oral, Daily  sodium chloride, 10 mL, Intravenous, Q12H  vitamin B-12, 1,000 mcg, Oral, Daily           Objective   Vital Signs  Temp:  [97.4 °F (36.3 °C)-97.7 °F (36.5 °C)] 97.7 °F (36.5 °C)  Heart Rate:  [83-95] 83  Resp:  [16-18] 16  BP: (116-148)/(67-91) 148/76  Body mass index is 20.23 kg/m².    Intake/Output Summary (Last 24 hours) at 3/26/2025 1545  Last data filed at 3/26/2025 1209  Gross per 24 hour   Intake 300 ml   Output --   Net 300 ml       Physical Exam      Results Review:       I reviewed the patient's new clinical results.  Results from last 7 days   Lab Units 25  0524 25  0556 25  0650 25  0042   WBC 10*3/mm3 12.14* 8.59 9.19 13.34*   HEMOGLOBIN g/dL 13.1 11.8* 14.0 13.9   PLATELETS 10*3/mm3 282 229 244 293     Results from last 7 days   Lab Units  03/26/25  0524 03/23/25  0556 03/22/25  0650 03/22/25  0042   SODIUM mmol/L 140 141 139 141   POTASSIUM mmol/L 4.0 4.3 4.7 4.7   CHLORIDE mmol/L 106 110* 107 105   CO2 mmol/L 23.0 22.0 20.6* 24.6   BUN mg/dL 22 28* 36* 40*   CREATININE mg/dL 0.85 1.03 1.42* 1.51*   CALCIUM mg/dL 8.6 8.4* 9.1 9.0   MAGNESIUM mg/dL  --   --   --  2.2   Estimated Creatinine Clearance: 73.1 mL/min (by C-G formula based on SCr of 0.85 mg/dL).      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       PLAN  This is a 74-year-old with history of Parkinson's dementia, hypertension, type 2 diabetes and chronic indwelling Holland catheter who presents to the hospital with increasing confusion and weakness and was found to have metabolic encephalopathy possibly related to acute renal failure and dehydration.  His stay is complicated by ongoing neurologic symptoms in his lower extremities  -His renal function has returned to baseline.  Responded well to IV fluids.  -Holland catheter in place for chronic urinary retention  -He had an MRI with anesthesia today tolerated that well.  -Results discussed with neuroradiology.  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  This is the same area where there was concern previously for compression fracture.  He does note that there is expansion of the epidural space and marrow edema with some concern for infection if it is in the differential but I think at this point the likelihood of osteomyelitis discitis or epidural abscess is very low.  I have  consulted neurosurgery for their assessment of the situation and possible surgical correction.  -The patient is a DNR.  I did call and speak with his sister on the phone.  His brother had requested a phone call I did reach out and leave a voicemail.  At this point he is likely to require surgical intervention especially given his neurologic complaints.  Unfortunately I do not know how well he will tolerate this procedure from dementia standpoint.  -Mechanical DVT prophylaxis  - DNR      Disposition  Expected Discharge Date: 3/28/2025; Expected Discharge Time:        Celio Ugarte MD  [facility]  03/26/25  15:45 EDT

## 2025-03-26 NOTE — CONSULTS
[facility] NEUROSURGERY CONSULT NOTE    Patient name: Singh Hatch  Referring Provider: Dr Ugarte  Reason for Consultation: T11 & T12 cord compression    Patient Care Team:  Salomon Mcknight MD as PCP - General (Internal Medicine)    Chief complaint: AMS, back and BLE pain    Subjective .     History of present illness:    Patient is a 74 y.o.  male with past medical history of Parkinson's disease, dementia, DM 2, prostate cancer.  Patient recently seen in consult by our service on 3/14/2025 for a T11 compression fracture that was seen on CT of the abdomen and pelvis.  Patient was given a TLSO brace.  Patient has had previous L3-5 fusion in 2019 by Dr. Bush.  Patient was sent back to the hospital 3/22 for AMS and overall decline per the nursing home.  Patient reports still having back pain and numbness and tingling in his legs.        History  PAST MEDICAL HISTORY  Past Medical History:   Diagnosis Date    Age-related osteoporosis without current pathological fracture     Atherosclerotic heart disease of native coronary artery without angina pectoris     Essential (primary) hypertension     Irritant contact dermatitis due to fecal, urinary or dual incontinence     Long term (current) use of insulin     Mixed hyperlipidemia     Other cervical disc degeneration, unspecified cervical region     Other intervertebral disc degeneration, 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     Other obstructive and reflux uropathy     Other specified anxiety disorders     Parkinson's disease without dyskinesia, without mention of fluctuations     Personal history of malignant neoplasm of prostate     Type 2 diabetes mellitus with hyperglycemia     Unspecified dementia, unspecified severity, without behavioral disturbance, psychotic disturbance, mood disturbance, and anxiety     Vitamin D deficiency, unspecified        PAST SURGICAL HISTORY  History reviewed. No pertinent surgical history.    FAMILY HISTORY  History  reviewed. No pertinent family history.    SOCIAL HISTORY  Social History     Tobacco Use    Smoking status: Never     Passive exposure: Never    Smokeless tobacco: Never   Vaping Use    Vaping status: Never Used   Substance Use Topics    Alcohol use: Never    Drug use: Not Currently     Allergies:  Patient has no known allergies.    MEDICATIONS:  Medications Prior to Admission   Medication Sig Dispense Refill Last Dose/Taking    aspirin 81 MG EC tablet Take 1 tablet by mouth Daily.   3/21/2025 Morning    carbidopa-levodopa (SINEMET)  MG per tablet Take 1.5 tablets by mouth 4 (Four) Times a Day.   3/21/2025 Bedtime    carbidopa-levodopa CR (SINEMET CR)  MG per CR tablet Take 1 tablet by mouth Every Night. Give at 2300   3/21/2025    dicyclomine (BENTYL) 20 MG tablet Take 1 tablet by mouth Every 6 (Six) Hours. 20 tablet 0 3/21/2025 Evening    gabapentin (NEURONTIN) 300 MG capsule Take 1 capsule by mouth 2 (Two) Times a Day.   3/21/2025 Bedtime    insulin glargine (LANTUS, SEMGLEE) 100 UNIT/ML injection Inject 40 Units under the skin into the appropriate area as directed Every Night.   3/21/2025 Bedtime    Insulin Lispro (humaLOG) 100 UNIT/ML injection Inject 2-10 Units under the skin into the appropriate area as directed 3 (Three) Times a Day Before Meals. -199=2 unit,200-249=4 unit,250-299=6 unit,300-349=8 unit,350-400=10 unit,>400= call MD   3/21/2025 Bedtime    miconazole (MICOTIN) 2 % cream Apply 1 Application topically to the appropriate area as directed 2 (Two) Times a Day. Clean scrotum with soap and water and apply miconazole to area r/t yeast   3/21/2025 Bedtime    mirtazapine (REMERON) 30 MG tablet Take 1.5 tablets by mouth Every Night.   3/21/2025 Bedtime    oxyCODONE-acetaminophen (Percocet) 5-325 MG per tablet Take 1 tablet by mouth Every 4 (Four) Hours As Needed for Severe Pain. 20 tablet 0 3/21/2025 Morning    sennosides-docusate (senna-docusate sodium) 8.6-50 MG per tablet Take 1  tablet by mouth 2 (Two) Times a Day for 30 days. 60 tablet 0 3/21/2025 Morning    sertraline (ZOLOFT) 25 MG tablet Take 3 tablets by mouth Daily.   3/21/2025 Morning    vitamin B-12 (CYANOCOBALAMIN) 1000 MCG tablet Take 1 tablet by mouth Daily.   3/21/2025 Morning    vitamin D (ERGOCALCIFEROL) 1.25 MG (25194 UT) capsule capsule Take 1 capsule by mouth 1 (One) Time Per Week. Wednesdays   Past Week    bisacodyl (Dulcolax) 10 MG suppository Insert 1 suppository into the rectum Daily As Needed for Constipation. 28 suppository 0 Unknown    cyclobenzaprine (FLEXERIL) 10 MG tablet Take 1 tablet by mouth 3 (Three) Times a Day As Needed for Muscle Spasms. 30 tablet 0 Unknown    dextrose (GLUTOSE) 40 % gel Take 15 g by mouth Every 1 (One) Hour As Needed for Low Blood Sugar.   Unknown    glucagon, human recombinant, (GLUCAGEN DIAGNOSTIC) 1 MG injection Infuse 1 mg into a venous catheter 1 (One) Time.   Unknown    polyethylene glycol (MIRALAX) 17 g packet Take 17 g by mouth 2 (Two) Times a Day. 60 packet 0 Unknown       Current Facility-Administered Medications:     acetaminophen (TYLENOL) tablet 650 mg, 650 mg, Oral, Q4H PRN, 650 mg at 03/23/25 2258 **OR** acetaminophen (TYLENOL) 160 MG/5ML oral solution 650 mg, 650 mg, Oral, Q4H PRN **OR** acetaminophen (TYLENOL) suppository 650 mg, 650 mg, Rectal, Q4H PRN, Tameka Valdez APRN    [Held by provider] aspirin EC tablet 81 mg, 81 mg, Oral, Daily, Angel Foley MD, 81 mg at 03/26/25 1419    sennosides-docusate (PERICOLACE) 8.6-50 MG per tablet 2 tablet, 2 tablet, Oral, BID PRN **AND** polyethylene glycol (MIRALAX) packet 17 g, 17 g, Oral, Daily PRN **AND** bisacodyl (DULCOLAX) EC tablet 5 mg, 5 mg, Oral, Daily PRN **AND** bisacodyl (DULCOLAX) suppository 10 mg, 10 mg, Rectal, Daily PRN, Tameka Valdez APRN    calcium carbonate (TUMS) chewable tablet 500 mg (200 mg elemental), 2 tablet, Oral, BID PRN, Tameka Valdez APRN    carbidopa-levodopa (SINEMET)   MG per tablet 1.5 tablet, 1.5 tablet, Oral, 4x Daily, Angel Foley MD, 1.5 tablet at 03/26/25 1420    carbidopa-levodopa CR (SINEMET CR)  MG per CR tablet 1 tablet, 1 tablet, Oral, Nightly, Angel Foley MD, 1 tablet at 03/25/25 2041    cyclobenzaprine (FLEXERIL) tablet 10 mg, 10 mg, Oral, TID PRN, Angel Foley MD, 10 mg at 03/25/25 2111    dextrose (D50W) (25 g/50 mL) IV injection 25 g, 25 g, Intravenous, Q15 Min PRN, Tameka Valdez APRN    dextrose (GLUTOSE) oral gel 15 g, 15 g, Oral, Q15 Min PRN, Tameka Valdez APRN    enoxaparin sodium (LOVENOX) syringe 40 mg, 40 mg, Subcutaneous, Q24H, Jez Diaz MD, 40 mg at 03/25/25 1713    gabapentin (NEURONTIN) capsule 300 mg, 300 mg, Oral, BID, Angel Foley MD, 300 mg at 03/26/25 1420    glucagon (GLUCAGEN) injection 1 mg, 1 mg, Intramuscular, Q15 Min PRN, Tameka Valdez APRN    glucagon (human recombinant) (GLUCAGEN DIAGNOSTIC) injection 1 mg, 1 mg, Intravenous, Once, Angel Foley MD    insulin lispro (HUMALOG/ADMELOG) injection 2-7 Units, 2-7 Units, Subcutaneous, 4x Daily AC & at Bedtime, Tameka Valdez APRN, 2 Units at 03/24/25 2054    LORazepam (ATIVAN) injection 1 mg, 1 mg, Intravenous, Once, Jez Diaz MD    miconazole (MICOTIN) 2 % cream 1 Application, 1 Application, Topical, BID, Angel Foley MD, 1 Application at 03/26/25 1421    mirtazapine (REMERON) tablet 45 mg, 45 mg, Oral, Nightly, Angel Foley MD, 45 mg at 03/25/25 2041    ondansetron ODT (ZOFRAN-ODT) disintegrating tablet 4 mg, 4 mg, Oral, Q6H PRN **OR** ondansetron (ZOFRAN) injection 4 mg, 4 mg, Intravenous, Q6H PRN, Tameka Valdez APRN, 4 mg at 03/24/25 1008    polyethylene glycol (MIRALAX) packet 17 g, 17 g, Oral, BID, Angel Foley MD, 17 g at 03/26/25 1420    sennosides-docusate (PERICOLACE) 8.6-50 MG per tablet 1 tablet, 1 tablet, Oral, BID, Angel Foley MD, 1 tablet at 03/26/25 1420    sertraline (ZOLOFT) tablet 75 mg, 75 mg,  Oral, Daily, Angel Foley MD, 75 mg at 03/26/25 1420    sodium chloride 0.9 % flush 10 mL, 10 mL, Intravenous, PRN, Rupinder Ellis MD    sodium chloride 0.9 % flush 10 mL, 10 mL, Intravenous, Q12H, Tameka Valdez APRN, 10 mL at 03/26/25 0937    sodium chloride 0.9 % flush 10 mL, 10 mL, Intravenous, PRN, Tameka Valdez APRN    sodium chloride 0.9 % infusion 40 mL, 40 mL, Intravenous, PRN, Tameka Valdez APRN    vitamin B-12 (CYANOCOBALAMIN) tablet 1,000 mcg, 1,000 mcg, Oral, Daily, Angel Foley MD, 1,000 mcg at 03/26/25 1419    COMORBID CONDITIONS:  Cancer including (primary or metastatic), Diabetes Type 2, and Parkinson's and dementia      Review of Systems  Review of Systems   Unable to perform ROS: Dementia   Musculoskeletal:  Positive for back pain.   Neurological:  Positive for weakness (Generalized and BLE) and numbness (BLE).     Objective     Physical Exam  Physical Exam  Vitals reviewed.   Constitutional:       General: He is awake.      Comments: Awake, frail and elderly appearing   Pulmonary:      Effort: Pulmonary effort is normal.   Musculoskeletal:      Cervical back: Normal.      Thoracic back: Bony tenderness present.      Lumbar back: Bony tenderness present.      Comments: Unable to assess straight leg raises, patient unable to lift legs off stretcher   Skin:     General: Skin is warm and dry.   Neurological:      Mental Status: He is alert.      Deep Tendon Reflexes:      Reflex Scores:       Tricep reflexes are 2+ on the right side and 2+ on the left side.       Bicep reflexes are 2+ on the right side and 2+ on the left side.       Brachioradialis reflexes are 2+ on the right side and 2+ on the left side.       Patellar reflexes are 1+ on the right side and 1+ on the left side.       Achilles reflexes are 1+ on the right side and 1+ on the left side.      Neurological Exam  Mental Status  Awake and alert.    Motor  Normal muscle bulk throughout. Normal muscle tone.  5/5  strength to BUE  Difficult to accurately assess BLE, pt is unable to raise off stretcher.    Sensory  Sensation is intact to light touch, pinprick, vibration and proprioception in all four extremities.    Reflexes                                            Right                      Left  Brachioradialis                    2+                         2+  Biceps                                 2+                         2+  Triceps                                2+                         2+  Patellar                                1+                         1+  Achilles                                1+                         1+    Right pathological reflexes: Vinicius's absent. Ankle clonus absent.  Left pathological reflexes: Vinicius's absent. Ankle clonus absent.    Gait    Gait not tested d/t weakness and fall risk.        Results Review:    LABS:    Admission on 03/22/2025   Component Date Value Ref Range Status    Glucose 03/22/2025 112  70 - 130 mg/dL Final    Glucose 03/22/2025 108 (H)  65 - 99 mg/dL Final    BUN 03/22/2025 40 (H)  8 - 23 mg/dL Final    Creatinine 03/22/2025 1.51 (H)  0.76 - 1.27 mg/dL Final    Sodium 03/22/2025 141  136 - 145 mmol/L Final    Potassium 03/22/2025 4.7  3.5 - 5.2 mmol/L Final    Chloride 03/22/2025 105  98 - 107 mmol/L Final    CO2 03/22/2025 24.6  22.0 - 29.0 mmol/L Final    Calcium 03/22/2025 9.0  8.6 - 10.5 mg/dL Final    Total Protein 03/22/2025 7.0  6.0 - 8.5 g/dL Final    Albumin 03/22/2025 3.8  3.5 - 5.2 g/dL Final    ALT (SGPT) 03/22/2025 5  1 - 41 U/L Final    AST (SGOT) 03/22/2025 26  1 - 40 U/L Final    Alkaline Phosphatase 03/22/2025 132 (H)  39 - 117 U/L Final    Total Bilirubin 03/22/2025 0.4  0.0 - 1.2 mg/dL Final    Globulin 03/22/2025 3.2  gm/dL Final    A/G Ratio 03/22/2025 1.2  g/dL Final    BUN/Creatinine Ratio 03/22/2025 26.5 (H)  7.0 - 25.0 Final    Anion Gap 03/22/2025 11.4  5.0 - 15.0 mmol/L Final    eGFR 03/22/2025 48.8 (L)  >60.0 mL/min/1.73 Final     Color, UA 03/22/2025 Yellow  Yellow, Straw Final    Appearance, UA 03/22/2025 Clear  Clear Final    pH, UA 03/22/2025 <=5.0  5.0 - 8.0 Final    Specific Gravity, UA 03/22/2025 1.028  1.005 - 1.030 Final    Glucose, UA 03/22/2025 Negative  Negative Final    Ketones, UA 03/22/2025 15 mg/dL (1+) (A)  Negative Final    Bilirubin, UA 03/22/2025 Negative  Negative Final    Blood, UA 03/22/2025 Negative  Negative Final    Protein, UA 03/22/2025 Trace (A)  Negative Final    Leuk Esterase, UA 03/22/2025 Negative  Negative Final    Nitrite, UA 03/22/2025 Negative  Negative Final    Urobilinogen, UA 03/22/2025 1.0 E.U./dL  0.2 - 1.0 E.U./dL Final    Creatine Kinase 03/22/2025 754 (H)  20 - 200 U/L Final    Magnesium 03/22/2025 2.2  1.6 - 2.4 mg/dL Final    Ammonia 03/22/2025 16  16 - 60 umol/L Final    QT Interval 03/22/2025 336  ms Final    QTC Interval 03/22/2025 443  ms Final    Extra Tube 03/22/2025 Hold for add-ons.   Final    Auto resulted.    Extra Tube 03/22/2025 hold for add-on   Final    Auto resulted    Extra Tube 03/22/2025 Hold for add-ons.   Final    Auto resulted.    Extra Tube 03/22/2025 Hold for add-ons.   Final    Auto resulted    WBC 03/22/2025 13.34 (H)  3.40 - 10.80 10*3/mm3 Final    RBC 03/22/2025 4.72  4.14 - 5.80 10*6/mm3 Final    Hemoglobin 03/22/2025 13.9  13.0 - 17.7 g/dL Final    Hematocrit 03/22/2025 43.4  37.5 - 51.0 % Final    MCV 03/22/2025 91.9  79.0 - 97.0 fL Final    MCH 03/22/2025 29.4  26.6 - 33.0 pg Final    MCHC 03/22/2025 32.0  31.5 - 35.7 g/dL Final    RDW 03/22/2025 13.4  12.3 - 15.4 % Final    RDW-SD 03/22/2025 45.4  37.0 - 54.0 fl Final    MPV 03/22/2025 9.6  6.0 - 12.0 fL Final    Platelets 03/22/2025 293  140 - 450 10*3/mm3 Final    Neutrophil % 03/22/2025 71.9  42.7 - 76.0 % Final    Lymphocyte % 03/22/2025 12.0 (L)  19.6 - 45.3 % Final    Monocyte % 03/22/2025 15.2 (H)  5.0 - 12.0 % Final    Eosinophil % 03/22/2025 0.2 (L)  0.3 - 6.2 % Final    Basophil % 03/22/2025 0.3  0.0 -  1.5 % Final    Immature Grans % 03/22/2025 0.4  0.0 - 0.5 % Final    Neutrophils, Absolute 03/22/2025 9.59 (H)  1.70 - 7.00 10*3/mm3 Final    Lymphocytes, Absolute 03/22/2025 1.60  0.70 - 3.10 10*3/mm3 Final    Monocytes, Absolute 03/22/2025 2.03 (H)  0.10 - 0.90 10*3/mm3 Final    Eosinophils, Absolute 03/22/2025 0.03  0.00 - 0.40 10*3/mm3 Final    Basophils, Absolute 03/22/2025 0.04  0.00 - 0.20 10*3/mm3 Final    Immature Grans, Absolute 03/22/2025 0.05  0.00 - 0.05 10*3/mm3 Final    nRBC 03/22/2025 0.0  0.0 - 0.2 /100 WBC Final    ADENOVIRUS, PCR 03/22/2025 Not Detected  Not Detected Final    Coronavirus 229E 03/22/2025 Not Detected  Not Detected Final    Coronavirus HKU1 03/22/2025 Not Detected  Not Detected Final    Coronavirus NL63 03/22/2025 Not Detected  Not Detected Final    Coronavirus OC43 03/22/2025 Not Detected  Not Detected Final    COVID19 03/22/2025 Not Detected  Not Detected - Ref. Range Final    Human Metapneumovirus 03/22/2025 Not Detected  Not Detected Final    Human Rhinovirus/Enterovirus 03/22/2025 Not Detected  Not Detected Final    Influenza A PCR 03/22/2025 Not Detected  Not Detected Final    Influenza B PCR 03/22/2025 Not Detected  Not Detected Final    Parainfluenza Virus 1 03/22/2025 Not Detected  Not Detected Final    Parainfluenza Virus 2 03/22/2025 Not Detected  Not Detected Final    Parainfluenza Virus 3 03/22/2025 Not Detected  Not Detected Final    Parainfluenza Virus 4 03/22/2025 Not Detected  Not Detected Final    RSV, PCR 03/22/2025 Not Detected  Not Detected Final    Bordetella pertussis pcr 03/22/2025 Not Detected  Not Detected Final    Bordetella parapertussis PCR 03/22/2025 Not Detected  Not Detected Final    Chlamydophila pneumoniae PCR 03/22/2025 Not Detected  Not Detected Final    Mycoplasma pneumo by PCR 03/22/2025 Not Detected  Not Detected Final    Glucose 03/22/2025 85  65 - 99 mg/dL Final    BUN 03/22/2025 36 (H)  8 - 23 mg/dL Final    Creatinine 03/22/2025 1.42 (H)   0.76 - 1.27 mg/dL Final    Sodium 03/22/2025 139  136 - 145 mmol/L Final    Potassium 03/22/2025 4.7  3.5 - 5.2 mmol/L Final    Slight hemolysis detected by analyzer. Result may be falsely elevated.    Chloride 03/22/2025 107  98 - 107 mmol/L Final    CO2 03/22/2025 20.6 (L)  22.0 - 29.0 mmol/L Final    Calcium 03/22/2025 9.1  8.6 - 10.5 mg/dL Final    BUN/Creatinine Ratio 03/22/2025 25.4 (H)  7.0 - 25.0 Final    Anion Gap 03/22/2025 11.4  5.0 - 15.0 mmol/L Final    eGFR 03/22/2025 52.5 (L)  >60.0 mL/min/1.73 Final    WBC 03/22/2025 9.19  3.40 - 10.80 10*3/mm3 Final    RBC 03/22/2025 4.59  4.14 - 5.80 10*6/mm3 Final    Hemoglobin 03/22/2025 14.0  13.0 - 17.7 g/dL Final    Hematocrit 03/22/2025 40.6  37.5 - 51.0 % Final    MCV 03/22/2025 88.5  79.0 - 97.0 fL Final    MCH 03/22/2025 30.5  26.6 - 33.0 pg Final    MCHC 03/22/2025 34.5  31.5 - 35.7 g/dL Final    RDW 03/22/2025 13.5  12.3 - 15.4 % Final    RDW-SD 03/22/2025 43.4  37.0 - 54.0 fl Final    MPV 03/22/2025 9.5  6.0 - 12.0 fL Final    Platelets 03/22/2025 244  140 - 450 10*3/mm3 Final    Creatine Kinase 03/22/2025 548 (H)  20 - 200 U/L Final    Glucose 03/22/2025 112  70 - 130 mg/dL Final    Glucose 03/22/2025 99  70 - 130 mg/dL Final    Glucose 03/22/2025 106  70 - 130 mg/dL Final    Glucose 03/22/2025 119  70 - 130 mg/dL Final    Creatine Kinase 03/23/2025 213 (H)  20 - 200 U/L Final    Glucose 03/23/2025 101 (H)  65 - 99 mg/dL Final    BUN 03/23/2025 28 (H)  8 - 23 mg/dL Final    Creatinine 03/23/2025 1.03  0.76 - 1.27 mg/dL Final    Sodium 03/23/2025 141  136 - 145 mmol/L Final    Potassium 03/23/2025 4.3  3.5 - 5.2 mmol/L Final    Slight hemolysis detected by analyzer. Result may be falsely elevated.    Chloride 03/23/2025 110 (H)  98 - 107 mmol/L Final    CO2 03/23/2025 22.0  22.0 - 29.0 mmol/L Final    Calcium 03/23/2025 8.4 (L)  8.6 - 10.5 mg/dL Final    BUN/Creatinine Ratio 03/23/2025 27.2 (H)  7.0 - 25.0 Final    Anion Gap 03/23/2025 9.0  5.0 - 15.0  mmol/L Final    eGFR 03/23/2025 77.2  >60.0 mL/min/1.73 Final    WBC 03/23/2025 8.59  3.40 - 10.80 10*3/mm3 Final    RBC 03/23/2025 3.94 (L)  4.14 - 5.80 10*6/mm3 Final    Hemoglobin 03/23/2025 11.8 (L)  13.0 - 17.7 g/dL Final    Hematocrit 03/23/2025 35.3 (L)  37.5 - 51.0 % Final    MCV 03/23/2025 89.6  79.0 - 97.0 fL Final    MCH 03/23/2025 29.9  26.6 - 33.0 pg Final    MCHC 03/23/2025 33.4  31.5 - 35.7 g/dL Final    RDW 03/23/2025 13.0  12.3 - 15.4 % Final    RDW-SD 03/23/2025 42.2  37.0 - 54.0 fl Final    MPV 03/23/2025 9.6  6.0 - 12.0 fL Final    Platelets 03/23/2025 229  140 - 450 10*3/mm3 Final    Glucose 03/23/2025 114  70 - 130 mg/dL Final    Glucose 03/23/2025 141 (H)  70 - 130 mg/dL Final    Glucose 03/23/2025 199 (H)  70 - 130 mg/dL Final    Glucose 03/23/2025 223 (H)  70 - 130 mg/dL Final    Glucose 03/24/2025 110  70 - 130 mg/dL Final    Glucose 03/24/2025 149 (H)  70 - 130 mg/dL Final    Glucose 03/24/2025 138 (H)  70 - 130 mg/dL Final    Glucose 03/24/2025 184 (H)  70 - 130 mg/dL Final    Glucose 03/25/2025 131 (H)  70 - 130 mg/dL Final    CANDIDA AURIS PCR 03/25/2025 Not Detected  Not Detected Final    Glucose 03/25/2025 133 (H)  70 - 130 mg/dL Final    Glucose 03/25/2025 125  70 - 130 mg/dL Final    WBC 03/26/2025 12.14 (H)  3.40 - 10.80 10*3/mm3 Final    RBC 03/26/2025 4.32  4.14 - 5.80 10*6/mm3 Final    Hemoglobin 03/26/2025 13.1  13.0 - 17.7 g/dL Final    Hematocrit 03/26/2025 39.0  37.5 - 51.0 % Final    MCV 03/26/2025 90.3  79.0 - 97.0 fL Final    MCH 03/26/2025 30.3  26.6 - 33.0 pg Final    MCHC 03/26/2025 33.6  31.5 - 35.7 g/dL Final    RDW 03/26/2025 13.3  12.3 - 15.4 % Final    RDW-SD 03/26/2025 44.6  37.0 - 54.0 fl Final    MPV 03/26/2025 9.3  6.0 - 12.0 fL Final    Platelets 03/26/2025 282  140 - 450 10*3/mm3 Final    Glucose 03/26/2025 178 (H)  65 - 99 mg/dL Final    BUN 03/26/2025 22  8 - 23 mg/dL Final    Creatinine 03/26/2025 0.85  0.76 - 1.27 mg/dL Final    Sodium 03/26/2025 140   136 - 145 mmol/L Final    Potassium 03/26/2025 4.0  3.5 - 5.2 mmol/L Final    Chloride 03/26/2025 106  98 - 107 mmol/L Final    CO2 03/26/2025 23.0  22.0 - 29.0 mmol/L Final    Calcium 03/26/2025 8.6  8.6 - 10.5 mg/dL Final    BUN/Creatinine Ratio 03/26/2025 25.9 (H)  7.0 - 25.0 Final    Anion Gap 03/26/2025 11.0  5.0 - 15.0 mmol/L Final    eGFR 03/26/2025 92.3  >60.0 mL/min/1.73 Final    Glucose 03/25/2025 187 (H)  70 - 130 mg/dL Final    Glucose 03/26/2025 158 (H)  70 - 130 mg/dL Final    Glucose 03/26/2025 143 (H)  70 - 130 mg/dL Final       DIAGNOSTICS:  MRI thoracic and lumbar spine:      IMPRESSION:  1. This patient has had extensive prior surgery from L2 to S1 with  bilateral laminectomies at L2, L3, L4 and L5 and bilateral medial  facetectomies at L2-3, L3-4, L4-5 and L5-S1 and there are disc implants  in the L3-4, L4-5 and L5-S1 disc spaces and there are bilateral rods  bridging the posterior elements from L2 to S1 anchored by bilateral  pedicle screws at L2, L3, L4, L5 and S1 and distal screws extending  through the distal rods across the sacral alae at S2 and across the  sacroiliac joints into the medial iliac bones. Due to marked  susceptibility artifact off the metal in the hardware, it is difficult  to evaluate the canal and foramina from L2 to S1 but I see no obvious  canal narrowing from L2 to S1. There are some areas of residual bony  foraminal narrowing at L3-4, L4-5 and on the right at L5-S1.     2. At L1-2, there is disc space narrowing, degenerative endplate  changes, a 2 mm retrolisthesis of L1 with respect to L2 and mild  posterior spurring but there is essentially no canal narrowing. There is  only at most mild foraminal narrowing at L1-2.     3. Sagittal localizer images for the thoracic spine demonstrate evidence  of previous cervical spine surgery with cervical fusion from C4 to C6  and there is a suggestion of multilevel canal and foraminal narrowing in  the cervical spine but  unfortunately the cervical spine is not  adequately assessed on the sagittal localizer images and could be  further evaluated with a cervical spine MRI if clinical symptoms  warrant.     4. The thoracic spine is normal from C7 down to T10.     5. The most significant pathology on this thoracic and lumbar spine MRI  is at the T11-12 level. This patient has anterior marginal bridging  osteophytes in the thoracic spine from T5 down to T11 serving to  partially fuse the vertebrae anteriorly from T5 down to T11 and has  anterior marginal bridging osteophytes from T12 to L1 serving to fuse  the vertebrae anteriorly and has had prior fusion of the lumbar spine  from L2 to S1. At the T11-12 level, there is moderate bilateral facet  overgrowth, some fluid in the facet joints and there is a 2 mm  anterolisthesis of T11 with respect to T12. There is some air and fluid  within the T11-12 disc space and there are abnormal contents extending  from the level of the T11-12 disc space superiorly within the anterior  epidural space to the level of the superior body of T11 and within the  right anterior epidural space measures up to 9 x 7 x 14 mm and within  the left anterior epidural space measures up to 9 x 6 x 10 mm. This may  all represent extruded disc material from the T11-12 disc space  extending superiorly in the anterior epidural space. The possibility of  infected contents in the anterior epidural space cannot be entirely  excluded. The combination of facet arthropathy and the likely extruded  disc material in the anterior epidural space from the midbody of T11 to  the T11-12 disc space efface the thecal sac, flatten the thoracic cord  resulting in severe canal narrowing from the midbody of T11 down to the  T11-12 disc space level with cord flattening and cord compression and I  cannot exclude some faint T2 high signal in the substance of the cord  from either edema or developing myelomalacia from cord compression from  the  midbody of the T11 vertebra down to the T11-12 disc space level.  There is also severe bilateral foraminal narrowing at T11-12 that could  compromise the exiting T11 nerve roots bilaterally. Similar findings  along the back of the T11 vertebra and at the T11-12 disc space have  been seen on the prior CT scans of the abdomen and pelvis from  02/20/2025 to 03/12/2025. Furthermore, this patient has exuberant bone  marrow edema and enhancement throughout the majority of the T11 and T12  vertebrae sparing the superior body of T11 and the inferior body of T12.  Given the fact that the patient has bony fusion of the vertebrae above  the T11 thoracic level and below the T12 thoracic level and the patient  is not fused at the T11-12 level, I feel that there is likely  instability at T11-12 and the fluid signal in the T11-12 disc space and  exuberant marrow edema and enhancement throughout the T11 and T12  vertebrae is most probably all secondary to degenerative marrow edema  and enhancement due to instability at the T11-12 level. 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 Of course, superimposed infection with discitis and  osteomyelitis at T11-12 cannot be excluded. These findings must be  correlated clinically and with laboratory values including sed rate and  CRP. Due to the cord compression along the course of the T11 vertebra  and at the T11-12 disc space and the lower extremity numbness,  neurosurgical consultation is warranted.    Results Review:   I reviewed the patient's new clinical results.  I personally viewed  the patient's chart/MRI thoracic & lumbar    Vital Signs   Temp:  [97.4 °F (36.3 °C)-98 °F (36.7 °C)] 97.7 °F (36.5 °C)  Heart Rate:   [83-95] 83  Resp:  [16-18] 16  BP: (116-148)/(67-91) 148/76      Assessment & Plan       Altered mental status    Chronic indwelling Holland catheter    Dementia    Parkinson disease    Type 2 diabetes mellitus with hyperglycemia, with long-term current use of insulin    Essential (primary) hypertension    DNR (do not resuscitate)      Problem List Items Addressed This Visit       * (Principal) Altered mental status - Primary     Other Visit Diagnoses         Non-traumatic rhabdomyolysis          SHALOM (acute kidney injury)          Polypharmacy               74-year-old male with history of Parkinson's and dementia, previous lumbar fusion in 2019 by Dr. Bush.  Seen in consult by our group 3/14/25 after being found to have a T11 compression fracture on CT abdomen and pelvis.  Patient was given a TLSO brace and advised to follow-up with Dr. Bush upon discharge.  Patient return to the hospital with AMS, back pain and bilateral lower extremity weakness.  MRI of the thoracic and lumbar spine completed.  Will have Dr Cason review imaging, further recommendations to follow.  Of note, per admitting service progress note from 3/25, discussion with patient's sister, she feels patient is nearing end of life and feels would not pursue surgery based on MRI findings.  Once imaging reviewed by Dr Cason, we can reach out to family to discuss treatment options/plan.    PLAN:   -Further recommendations to follow once Dr Cason has reviewed MRI results     I discussed the patient's findings and my recommendations with patient    During patient visit, I utilized appropriate personal protective equipment including gloves and mask.  Mask used was standard procedure mask. Appropriate PPE was worn during the entire visit.  Hand hygiene was completed before and after.     I spent 50 minutes caring for Singh Hatch on this date of service. This time includes time spent by me in the following activities: preparing for the visit,  "reviewing tests, obtaining and/or reviewing a separately obtained history, performing a medically appropriate examination and/or evaluation, counseling and educating the patient/family/caregiver, ordering medications, tests, or procedures, referring and communicating with other health care professionals, documenting information in the medical record, independently interpreting results and communicating that information with the patient/family/caregiver, and care coordination         Julieta Mcgraw, APRN  03/26/25  15:05 EDT    \"Dictated utilizing Dragon dictation\".      "

## 2025-03-26 NOTE — PLAN OF CARE
Problem: Adult Inpatient Plan of Care  Goal: Plan of Care Review  Outcome: Progressing  Flowsheets (Taken 3/26/2025 1830)  Progress: no change  Plan of Care Reviewed With: patient  Goal: Patient-Specific Goal (Individualized)  Outcome: Progressing  Goal: Absence of Hospital-Acquired Illness or Injury  Outcome: Progressing  Intervention: Identify and Manage Fall Risk  Recent Flowsheet Documentation  Taken 3/26/2025 1810 by Rina Lezama RN  Safety Promotion/Fall Prevention:   activity supervised   assistive device/personal items within reach   clutter free environment maintained   fall prevention program maintained   nonskid shoes/slippers when out of bed   safety round/check completed   room organization consistent  Taken 3/26/2025 1610 by Rina Lezama RN  Safety Promotion/Fall Prevention:   activity supervised   assistive device/personal items within reach   clutter free environment maintained   fall prevention program maintained   nonskid shoes/slippers when out of bed   safety round/check completed   room organization consistent  Taken 3/26/2025 1410 by Rina Lezama RN  Safety Promotion/Fall Prevention:   activity supervised   assistive device/personal items within reach   clutter free environment maintained   fall prevention program maintained   nonskid shoes/slippers when out of bed   safety round/check completed   room organization consistent  Taken 3/26/2025 1322 by Rina Lezama RN  Safety Promotion/Fall Prevention:   activity supervised   assistive device/personal items within reach   clutter free environment maintained   fall prevention program maintained   nonskid shoes/slippers when out of bed   safety round/check completed   room organization consistent  Taken 3/26/2025 1210 by Rina Lezama RN  Safety Promotion/Fall Prevention: patient off unit  Taken 3/26/2025 1010 by Rina Lezama RN  Safety Promotion/Fall Prevention: patient off unit  Taken 3/26/2025 0810 by  [place], [place], RN  Safety Promotion/Fall Prevention:   activity supervised   assistive device/personal items within reach   clutter free environment maintained   fall prevention program maintained   nonskid shoes/slippers when out of bed   safety round/check completed   room organization consistent  Intervention: Prevent Skin Injury  Recent Flowsheet Documentation  Taken 3/26/2025 0810 by Rina Lezama RN  Body Position:   position changed independently   weight shifting  Skin Protection: incontinence pads utilized  Intervention: Prevent and Manage VTE (Venous Thromboembolism) Risk  Recent Flowsheet Documentation  Taken 3/26/2025 1322 by Rina Lezama RN  VTE Prevention/Management: SCDs (sequential compression devices) on  Intervention: Prevent Infection  Recent Flowsheet Documentation  Taken 3/26/2025 1810 by Rina Lezama RN  Infection Prevention:   single patient room provided   hand hygiene promoted  Taken 3/26/2025 1610 by Rina Lezama RN  Infection Prevention:   single patient room provided   hand hygiene promoted  Taken 3/26/2025 1410 by Rina Lezama RN  Infection Prevention:   single patient room provided   hand hygiene promoted  Taken 3/26/2025 1322 by Rina Lezama RN  Infection Prevention:   single patient room provided   hand hygiene promoted  Taken 3/26/2025 0810 by Rina Lezama RN  Infection Prevention:   single patient room provided   hand hygiene promoted  Goal: Optimal Comfort and Wellbeing  Outcome: Progressing  Intervention: Provide Person-Centered Care  Recent Flowsheet Documentation  Taken 3/26/2025 1322 by Rina Lezama RN  Trust Relationship/Rapport: care explained  Taken 3/26/2025 0810 by Rina Lezama RN  Trust Relationship/Rapport:   care explained   thoughts/feelings acknowledged  Goal: Readiness for Transition of Care  Outcome: Progressing     Problem: Fall Injury Risk  Goal: Absence of Fall and Fall-Related Injury  Outcome:  Progressing  Intervention: Identify and Manage Contributors  Recent Flowsheet Documentation  Taken 3/26/2025 0810 by Rina Lezama RN  Medication Review/Management: medications reviewed  Intervention: Promote Injury-Free Environment  Recent Flowsheet Documentation  Taken 3/26/2025 1810 by Rina Lezama RN  Safety Promotion/Fall Prevention:   activity supervised   assistive device/personal items within reach   clutter free environment maintained   fall prevention program maintained   nonskid shoes/slippers when out of bed   safety round/check completed   room organization consistent  Taken 3/26/2025 1610 by Rina Lezama RN  Safety Promotion/Fall Prevention:   activity supervised   assistive device/personal items within reach   clutter free environment maintained   fall prevention program maintained   nonskid shoes/slippers when out of bed   safety round/check completed   room organization consistent  Taken 3/26/2025 1410 by Rina Lezama RN  Safety Promotion/Fall Prevention:   activity supervised   assistive device/personal items within reach   clutter free environment maintained   fall prevention program maintained   nonskid shoes/slippers when out of bed   safety round/check completed   room organization consistent  Taken 3/26/2025 1322 by Rina Lezama RN  Safety Promotion/Fall Prevention:   activity supervised   assistive device/personal items within reach   clutter free environment maintained   fall prevention program maintained   nonskid shoes/slippers when out of bed   safety round/check completed   room organization consistent  Taken 3/26/2025 1210 by Rina Lezama RN  Safety Promotion/Fall Prevention: patient off unit  Taken 3/26/2025 1010 by Rina Lezama RN  Safety Promotion/Fall Prevention: patient off unit  Taken 3/26/2025 0810 by Rina Lezama RN  Safety Promotion/Fall Prevention:   activity supervised   assistive device/personal items within reach   clutter free  environment maintained   fall prevention program maintained   nonskid shoes/slippers when out of bed   safety round/check completed   room organization consistent     Problem: Skin Injury Risk Increased  Goal: Skin Health and Integrity  Outcome: Progressing  Intervention: Optimize Skin Protection  Recent Flowsheet Documentation  Taken 3/26/2025 1322 by Rina Lezama RN  Activity Management: activity minimized  Taken 3/26/2025 0810 by Rina Lezama RN  Activity Management: activity minimized  Pressure Reduction Techniques:   weight shift assistance provided   heels elevated off bed  Head of Bed (HOB) Positioning: HOB at 20-30 degrees  Skin Protection: incontinence pads utilized  Intervention: Promote and Optimize Oral Intake  Recent Flowsheet Documentation  Taken 3/26/2025 0810 by Rina Lezama RN  Nutrition Interventions: meal set-up provided     Problem: Violence Risk or Actual  Goal: Anger and Impulse Control  Outcome: Progressing  Intervention: Minimize Safety Risk  Recent Flowsheet Documentation  Taken 3/26/2025 1810 by Rina Lezama RN  De-Escalation Techniques: stimulation decreased  Enhanced Safety Measures: bed alarm set  Taken 3/26/2025 1610 by Rina Lezama RN  De-Escalation Techniques: stimulation decreased  Enhanced Safety Measures: bed alarm set  Taken 3/26/2025 1410 by Rina Lezama RN  De-Escalation Techniques: stimulation decreased  Enhanced Safety Measures: bed alarm set  Taken 3/26/2025 1322 by Rina Lezama RN  De-Escalation Techniques: stimulation decreased  Enhanced Safety Measures: bed alarm set  Taken 3/26/2025 0810 by Rina Lezama RN  De-Escalation Techniques: stimulation decreased  Enhanced Safety Measures: bed alarm set   Goal Outcome Evaluation:  Plan of Care Reviewed With: patient        Progress: no change

## 2025-03-26 NOTE — ANESTHESIA PROCEDURE NOTES
Airway  Date/Time: 3/26/2025 10:26 AM  Airway not difficult    General Information and Staff    Patient location during procedure: OR  Anesthesiologist: Mitchell Brandon MD    Indications and Patient Condition    Preoxygenated: yes    Mask difficulty assessment: 0 - not attempted    Final Airway Details    Final airway type: supraglottic airway      Successful airway: LMA  Size: 4   Number of attempts at approach: 1  Assessment: lips, teeth, and gum same as pre-op

## 2025-03-26 NOTE — PLAN OF CARE
Goal Outcome Evaluation:      No change     Problem: Adult Inpatient Plan of Care  Goal: Plan of Care Review  Outcome: Progressing  Goal: Patient-Specific Goal (Individualized)  Outcome: Progressing  Goal: Absence of Hospital-Acquired Illness or Injury  Outcome: Progressing  Intervention: Identify and Manage Fall Risk  Recent Flowsheet Documentation  Taken 3/26/2025 0411 by Raphael Guevara RN  Safety Promotion/Fall Prevention:   safety round/check completed   room organization consistent   fall prevention program maintained   clutter free environment maintained  Taken 3/26/2025 0211 by Raphael Guevara RN  Safety Promotion/Fall Prevention:   safety round/check completed   room organization consistent   nonskid shoes/slippers when out of bed   fall prevention program maintained   clutter free environment maintained  Taken 3/26/2025 0019 by Raphael Guevara RN  Safety Promotion/Fall Prevention:   safety round/check completed   room organization consistent   nonskid shoes/slippers when out of bed   fall prevention program maintained   clutter free environment maintained  Taken 3/25/2025 2223 by Raphael Guevara RN  Safety Promotion/Fall Prevention:   safety round/check completed   room organization consistent   nonskid shoes/slippers when out of bed   fall prevention program maintained   clutter free environment maintained  Taken 3/25/2025 2021 by Raphael Guevara RN  Safety Promotion/Fall Prevention:   safety round/check completed   room organization consistent   nonskid shoes/slippers when out of bed   fall prevention program maintained   clutter free environment maintained  Intervention: Prevent Skin Injury  Recent Flowsheet Documentation  Taken 3/26/2025 0411 by Raphael Guevara RN  Body Position:   position changed independently   weight shifting  Taken 3/26/2025 0211 by Raphael Guevara RN  Body Position:   position changed independently   weight shifting  Taken 3/26/2025 0019 by Raphael Guevara RN  Body Position:    supine   turned  Taken 3/25/2025 2223 by Raphael Guevara RN  Body Position:   position changed independently   position maintained  Taken 3/25/2025 2021 by Raphael Guevara RN  Body Position:   supine   weight shifting  Intervention: Prevent and Manage VTE (Venous Thromboembolism) Risk  Recent Flowsheet Documentation  Taken 3/26/2025 0019 by Raphael Guevara RN  VTE Prevention/Management:   bilateral   SCDs (sequential compression devices) on  Taken 3/25/2025 2021 by Raphael Guevara RN  VTE Prevention/Management:   bilateral   SCDs (sequential compression devices) on  Intervention: Prevent Infection  Recent Flowsheet Documentation  Taken 3/26/2025 0411 by Raphael Guevara RN  Infection Prevention:   rest/sleep promoted   single patient room provided  Taken 3/26/2025 0211 by Raphael Guevara RN  Infection Prevention:   single patient room provided   rest/sleep promoted  Taken 3/26/2025 0019 by Raphael Guevara RN  Infection Prevention:   rest/sleep promoted   single patient room provided  Taken 3/25/2025 2223 by Raphael Guevara RN  Infection Prevention:   rest/sleep promoted   single patient room provided  Taken 3/25/2025 2021 by Raphael Guevara RN  Infection Prevention:   single patient room provided   rest/sleep promoted  Goal: Optimal Comfort and Wellbeing  Outcome: Progressing  Intervention: Provide Person-Centered Care  Recent Flowsheet Documentation  Taken 3/26/2025 0019 by Raphael Guevara RN  Trust Relationship/Rapport:   care explained   choices provided   thoughts/feelings acknowledged  Taken 3/25/2025 2021 by Raphael Guevara RN  Trust Relationship/Rapport:   care explained   choices provided  Goal: Readiness for Transition of Care  Outcome: Progressing     Problem: Fall Injury Risk  Goal: Absence of Fall and Fall-Related Injury  Outcome: Progressing  Intervention: Identify and Manage Contributors  Recent Flowsheet Documentation  Taken 3/26/2025 0411 by Raphael Guevara RN  Medication Review/Management:  medications reviewed  Taken 3/26/2025 0211 by Raphael Guevara, RN  Medication Review/Management: medications reviewed  Taken 3/26/2025 0019 by Raphael Guevara RN  Medication Review/Management: medications reviewed  Taken 3/25/2025 2223 by Raphael Guevara RN  Medication Review/Management:   medications reviewed   high-risk medications identified  Taken 3/25/2025 2021 by Raphael Guevara, RN  Medication Review/Management:   high-risk medications identified   medications reviewed  Intervention: Promote Injury-Free Environment  Recent Flowsheet Documentation  Taken 3/26/2025 0411 by Raphael Guevara, RN  Safety Promotion/Fall Prevention:   safety round/check completed   room organization consistent   fall prevention program maintained   clutter free environment maintained  Taken 3/26/2025 0211 by Raphael Guevara RN  Safety Promotion/Fall Prevention:   safety round/check completed   room organization consistent   nonskid shoes/slippers when out of bed   fall prevention program maintained   clutter free environment maintained  Taken 3/26/2025 0019 by Raphael Guevara RN  Safety Promotion/Fall Prevention:   safety round/check completed   room organization consistent   nonskid shoes/slippers when out of bed   fall prevention program maintained   clutter free environment maintained  Taken 3/25/2025 2223 by Raphael Guevara, RN  Safety Promotion/Fall Prevention:   safety round/check completed   room organization consistent   nonskid shoes/slippers when out of bed   fall prevention program maintained   clutter free environment maintained  Taken 3/25/2025 2021 by Raphael Guevara, RN  Safety Promotion/Fall Prevention:   safety round/check completed   room organization consistent   nonskid shoes/slippers when out of bed   fall prevention program maintained   clutter free environment maintained     Problem: Skin Injury Risk Increased  Goal: Skin Health and Integrity  Outcome: Progressing  Intervention: Optimize Skin Protection  Recent  Flowsheet Documentation  Taken 3/26/2025 0411 by Raphael Guevara RN  Head of Bed (HOB) Positioning: HOB at 20-30 degrees  Taken 3/26/2025 0211 by Raphael Guevara RN  Head of Bed (HOB) Positioning: HOB at 20-30 degrees  Taken 3/26/2025 0019 by Raphael Guevara RN  Head of Bed (HOB) Positioning: HOB at 20-30 degrees  Taken 3/25/2025 2223 by Raphael Guevara RN  Head of Bed (HOB) Positioning: HOB at 20-30 degrees  Taken 3/25/2025 2021 by Raphael Guevara RN  Head of Bed (HOB) Positioning: HOB at 20-30 degrees     Problem: Violence Risk or Actual  Goal: Anger and Impulse Control  Outcome: Progressing  Intervention: Minimize Safety Risk  Recent Flowsheet Documentation  Taken 3/26/2025 0411 by Raphael Guevara RN  De-Escalation Techniques:   stimulation decreased   quiet time facilitated  Enhanced Safety Measures: room near unit station  Taken 3/26/2025 0211 by Raphael Guevara RN  De-Escalation Techniques:   stimulation decreased   quiet time facilitated  Enhanced Safety Measures: room near unit station  Taken 3/26/2025 0019 by Raphael Guevara RN  De-Escalation Techniques:   stimulation decreased   quiet time facilitated  Enhanced Safety Measures: room near unit station  Taken 3/25/2025 2223 by Raphael Guevara RN  De-Escalation Techniques:   stimulation decreased   quiet time facilitated  Enhanced Safety Measures: room near unit station  Taken 3/25/2025 2021 by Raphael Guevara RN  De-Escalation Techniques:   stimulation decreased   quiet time facilitated  Enhanced Safety Measures: room near unit station

## 2025-03-26 NOTE — ANESTHESIA POSTPROCEDURE EVALUATION
Patient: Singh Hatch    Procedure Summary       Date: 03/26/25 Room / Location: [facility] MRI    Anesthesia Start: 0957 Anesthesia Stop: 1218    Procedures:       MRI LUMBAR SPINE W WO CONTRAST      MRI THORACIC SPINE W WO CONTRAST Diagnosis:       (back pain and LE numbness)      (back pain and LE numbness)    Scheduled Providers:  Provider: Mitchell Brandon MD    Anesthesia Type: general ASA Status: 3            Anesthesia Type: general    Vitals  Vitals Value Taken Time   /74 03/26/25 12:45   Temp 36.5 °C (97.7 °F) 03/26/25 12:55   Pulse 88 03/26/25 12:59   Resp 16 03/26/25 12:45   SpO2 97 % 03/26/25 12:59   Vitals shown include unfiled device data.        Post Anesthesia Care and Evaluation    Patient location during evaluation: bedside  Pain management: adequate    Airway patency: patent  Anesthetic complications: No anesthetic complications    Cardiovascular status: acceptable  Respiratory status: acceptable  Hydration status: acceptable

## 2025-03-26 NOTE — ANESTHESIA PREPROCEDURE EVALUATION
Anesthesia Evaluation     NPO Solid Status: > 8 hours             Airway   Mallampati: II  TM distance: >3 FB  Neck ROM: full  Dental - normal exam     Pulmonary - normal exam   Cardiovascular - normal exam    (+) hypertension, CAD, hyperlipidemia      Neuro/Psych  (+) Parkinson's disease, dementia  GI/Hepatic/Renal/Endo    (+) diabetes mellitus type 2    Musculoskeletal     Abdominal    Substance History      OB/GYN          Other                    Anesthesia Plan    ASA 3     general     (I have reviewed the patient's history with the patient's sister and the chart, including all pertinent laboratory results and imaging. I have explained the risks of anesthesia including but not limited to dental damage, sore throat, nausea, corneal abrasion, nerve injury, MI, stroke, and death.  )  intravenous induction     Anesthetic plan, risks, benefits, and alternatives have been provided, discussed and informed consent has been obtained with: sibling.    CODE STATUS:

## 2025-03-27 LAB
GLUCOSE BLDC GLUCOMTR-MCNC: 163 MG/DL (ref 70–130)
GLUCOSE BLDC GLUCOMTR-MCNC: 176 MG/DL (ref 70–130)
GLUCOSE BLDC GLUCOMTR-MCNC: 205 MG/DL (ref 70–130)
GLUCOSE BLDC GLUCOMTR-MCNC: 218 MG/DL (ref 70–130)
GLUCOSE BLDC GLUCOMTR-MCNC: 237 MG/DL (ref 70–130)

## 2025-03-27 PROCEDURE — 82948 REAGENT STRIP/BLOOD GLUCOSE: CPT

## 2025-03-27 PROCEDURE — 99232 SBSQ HOSP IP/OBS MODERATE 35: CPT | Performed by: NURSE PRACTITIONER

## 2025-03-27 PROCEDURE — 63710000001 INSULIN LISPRO (HUMAN) PER 5 UNITS: Performed by: NURSE PRACTITIONER

## 2025-03-27 PROCEDURE — 25010000002 ENOXAPARIN PER 10 MG: Performed by: INTERNAL MEDICINE

## 2025-03-27 RX ADMIN — Medication 10 ML: at 21:38

## 2025-03-27 RX ADMIN — INSULIN LISPRO 2 UNITS: 100 INJECTION, SOLUTION INTRAVENOUS; SUBCUTANEOUS at 08:43

## 2025-03-27 RX ADMIN — POLYETHYLENE GLYCOL 3350 17 G: 17 POWDER, FOR SOLUTION ORAL at 08:43

## 2025-03-27 RX ADMIN — CARBIDOPA AND LEVODOPA 1.5 TABLET: 25; 250 TABLET ORAL at 12:32

## 2025-03-27 RX ADMIN — CARBIDOPA AND LEVODOPA 1.5 TABLET: 25; 250 TABLET ORAL at 17:26

## 2025-03-27 RX ADMIN — SERTRALINE HYDROCHLORIDE 75 MG: 50 TABLET, FILM COATED ORAL at 08:42

## 2025-03-27 RX ADMIN — MICONAZOLE NITRATE 1 APPLICATION: 20 CREAM TOPICAL at 00:00

## 2025-03-27 RX ADMIN — ENOXAPARIN SODIUM 40 MG: 100 INJECTION SUBCUTANEOUS at 17:26

## 2025-03-27 RX ADMIN — SENNOSIDES AND DOCUSATE SODIUM 1 TABLET: 50; 8.6 TABLET ORAL at 08:43

## 2025-03-27 RX ADMIN — CARBIDOPA AND LEVODOPA 1.5 TABLET: 25; 250 TABLET ORAL at 08:43

## 2025-03-27 RX ADMIN — Medication 1000 MCG: at 08:42

## 2025-03-27 RX ADMIN — INSULIN LISPRO 3 UNITS: 100 INJECTION, SOLUTION INTRAVENOUS; SUBCUTANEOUS at 21:38

## 2025-03-27 RX ADMIN — POLYETHYLENE GLYCOL 3350 17 G: 17 POWDER, FOR SOLUTION ORAL at 21:36

## 2025-03-27 RX ADMIN — MICONAZOLE NITRATE 1 APPLICATION: 20 CREAM TOPICAL at 21:47

## 2025-03-27 RX ADMIN — MICONAZOLE NITRATE 1 APPLICATION: 20 CREAM TOPICAL at 08:50

## 2025-03-27 RX ADMIN — INSULIN LISPRO 3 UNITS: 100 INJECTION, SOLUTION INTRAVENOUS; SUBCUTANEOUS at 13:45

## 2025-03-27 RX ADMIN — CARBIDOPA AND LEVODOPA 1 TABLET: 50; 200 TABLET, EXTENDED RELEASE ORAL at 21:37

## 2025-03-27 RX ADMIN — MIRTAZAPINE 45 MG: 15 TABLET, FILM COATED ORAL at 21:37

## 2025-03-27 RX ADMIN — GABAPENTIN 300 MG: 300 CAPSULE ORAL at 21:37

## 2025-03-27 RX ADMIN — INSULIN LISPRO 2 UNITS: 100 INJECTION, SOLUTION INTRAVENOUS; SUBCUTANEOUS at 17:26

## 2025-03-27 RX ADMIN — GABAPENTIN 300 MG: 300 CAPSULE ORAL at 08:43

## 2025-03-27 RX ADMIN — CARBIDOPA AND LEVODOPA 1.5 TABLET: 25; 250 TABLET ORAL at 21:37

## 2025-03-27 RX ADMIN — SENNOSIDES AND DOCUSATE SODIUM 1 TABLET: 50; 8.6 TABLET ORAL at 21:37

## 2025-03-27 RX ADMIN — Medication 10 ML: at 08:50

## 2025-03-27 NOTE — PROGRESS NOTES
LOS: 2 days   Patient Care Team:  Salomon Mcknight MD as PCP - General (Internal Medicine)    Chief Complaint: Weakness; thoracic cord compression     Subjective       Interval History: I am seeing this patient as a follow up visit however this patient and his current medical issues are new to me as of today's visit.     I have reviewed the chart and previous neurosurgery notes.  This is a 74-year-old male with a history of prostate CA, prior L3-L5 posterior lumbar compression/fusion March 2019   performed at [facility], Parkinson's disease, dementia, type 2 diabetes mellitus, prostate CA, osteoporosis. He represented to ED March 12, 2025 for altered mental status and admitted to hospitalist service for SHALOM, mild elevated CK, and leukocytosis. Neurosurgery saw March 14, 2025 for  finding of inferior endplate fracture at right margin of T11. No motor or sensory deficits on exam and the patient was fitted for TLSO brace with instructions to follow-up with his original surgeon, Dr. Bush for any worsening symptoms.    Re-presented to [facility] March 22, 2025 for altered mental status, back pain, lower lower extremity weakness.  According to history, the neurosurgery nurse practitioner who saw the patient yesterday, HENNA Duff [facility] and was informed by staff that the patient has a history of multiple falls.   MRI thoracic and lumbar spine with and without contrast under general anesthesia revealed T11 and T12 fractures with exuberant edema and probable T11/12 disc protrusion contributing to cord compression with some mild cord hyperintensity possibly related to developing myelomalacia.     The patient is AA&O x 3. His speech is slightly slurred but intelligible. The patient reported a history of repeat falls for the last several months to a year. He reports weakness in the left leg only. He denies any leg pain. He also complains of  significant lower thoracic pain that is worse with coughing or movement. There is currently no family present.    History taken from: patient chart    Objective        Vital Signs  Temp:  [97.5 °F (36.4 °C)-98.3 °F (36.8 °C)] 98.3 °F (36.8 °C)  Heart Rate:  [82-95] 88  Resp:  [16-18] 17  BP: (115-148)/(67-85) 135/79    Physical Exam:     AA&O x 3. Makes eye contact. Speech slurred but intelligible.   Follows commands x 4 extremities.   Face symmetric, tongue midline without fasciculations or atrophy. Sensation equal and intact in bilateral V1, V2, V3 distributions.  No drift. No dysmetria.   DTR's 2+ throughout. Negative Mcgregor's, negative clonus bilaterally.   Motor 5/5 throughout except left iliopsoas 3-/5; anti-gravity 4/5 weakness remainder of left leg.    Normal sensation in upper and R lower extremities; sensation decreased to light touch L anterior thigh.    Negative SLR bilaterally.      Results Review:      MRI THORACIC SPINE W/WO CONTRAST 3/26/2025      Moderate facet overgrowth with fluid within the facet joints as well as signal loss in the anterior disc space at T11-12.  Hyperintensity seen in the central posterior T11-12 disc space with increased enhancement throughout the majority of both the T11 and T12 vertebrae.  These findings are compatible with exuberant marrow edema and enhancement.  Abnormality within the anterior epidural space T11-12 with suspected partial of contrast-enhancement.  These findings most likely related to disc extrusion extending from T11-12 along the posterior body of T12 in the right anterior epidural space measuring 9 x 7 x 14 mm and a left 9 x 6 x 10 mm protrusion within the left anterior epidural space with uplifting of the posterior longitudinal ligament of the back of the mid and inferior body of T11.  Abutment and flattening of the thoracic cord with severe canal narrowing and cord compression from superior body T11 down through T11-12.  Severe foraminal narrowing  compressing the T11 nerve root concerning for possible T11-12 instability.  These findings well probably most consistent with disc material, superimposed phlegmon of the anterior epidural space cannot be excluded.    MRI LUMBAR SPINE W/WO CONTRAST 3/26/2025    Extensive prior lumbar surgery noted from L2-S1 consisting of laminectomies, facetectomies, disc implants L3-4, L4-5, L5-S1, bilateral rods from L2-S1 anchored by pedicle screws and distal screws extending across the sacral alae at S2.  Adjacent level disc space narrowing at L1 to which is degenerative in nature but there is no significant canal or foraminal narrowing.    .  Results from last 7 days   Lab Units 03/26/25  0524 03/23/25  0556 03/22/25  0650   WBC 10*3/mm3 12.14* 8.59 9.19   HEMOGLOBIN g/dL 13.1 11.8* 14.0   HEMATOCRIT % 39.0 35.3* 40.6   PLATELETS 10*3/mm3 282 229 244     .  Results from last 7 days   Lab Units 03/26/25  0524 03/23/25  0556 03/22/25  0650   SODIUM mmol/L 140 141 139   POTASSIUM mmol/L 4.0 4.3 4.7   CHLORIDE mmol/L 106 110* 107   CO2 mmol/L 23.0 22.0 20.6*   BUN mg/dL 22 28* 36*   CREATININE mg/dL 0.85 1.03 1.42*   GLUCOSE mg/dL 178* 101* 85   CALCIUM mg/dL 8.6 8.4* 9.1      I reviewed the patient's new clinical results.  I reviewed the patient's new imaging results and agree with the interpretation.  Discussed with patient and Dr. Cason.     Assessment & Plan       Altered mental status    Chronic indwelling Holland catheter    Dementia    Parkinson disease    Type 2 diabetes mellitus with hyperglycemia, with long-term current use of insulin    Essential (primary) hypertension    DNR (do not resuscitate)    74-year-old male with history of prostate CA, Parkinson's disease, chronic dementia, prior L3-L5 posterior lumbar fusion March 2019 with Dr. Bush, [facility] spine surgery.    Patient resides in a nursing home. No family locally. The patient reports a history of intermittent falls.    Recently evaluated by  neurosurgery March 14th for findings of a small endplate fracture at T12.  Fitted with a TLSO brace.    Patient returns to [facility] emergency department with subsequent status change and lower extremity weakness.  MRI reveals compression fracture at T11 and T12 with significant stenosis and cord compression.  Report from the nursing facility indicates that the patient had been having multiple falls.      I have spoken with Dr. Cason. He saw the patient last evening and is planning to get in touch with family at some point today. The patient is a surgical candidate which would include thoracic laminecomy for cord decompression. Dr. Cason spoke with the daughter who wanted to discuss further with family. Dr. Cason will contact the family for further discussion regarding surgical intervention with family and the patient. If desired surgery possibly Saturday.     Maria Alejandra Harris, APRN  03/27/25  10:12 EDT

## 2025-03-27 NOTE — SIGNIFICANT NOTE
03/27/25 0955   OTHER   Discipline physical therapist   Rehab Time/Intention   Session Not Performed other (see comments)  (PT to await further DANNY recs and GOC prior to performing tx. Will cont to follow.)   Therapy Assessment/Plan (PT)   Criteria for Skilled Interventions Met (PT) yes;meets criteria   Recommendation   PT - Next Appointment 03/28/25

## 2025-03-27 NOTE — PLAN OF CARE
Goal Outcome Evaluation:  Plan of Care Reviewed With: patient      Progress: improving   Patient seen by DANNY this shift, see note. Patient had medium bowel movement this shift aided by digital stimulation by this nurse, and coccyx mepilex changed. No new changes this shift, AAOX2, VSS. Patient resting in bed with call light within reach and bed alarm on.

## 2025-03-27 NOTE — SIGNIFICANT NOTE
03/27/25 1405   OTHER   Discipline occupational therapist   Rehab Time/Intention   Session Not Performed other (see comments)  (await further DANNY recs and GOC prior to performing tx. Will cont to follow.)   Therapy Assessment/Plan (PT)   Criteria for Skilled Interventions Met (PT) yes;meets criteria   Recommendation   OT - Next Appointment 03/28/25

## 2025-03-27 NOTE — PROGRESS NOTES
Ded[facility] to Hospital Care    [phone]   LOS: 2 days     Name: Singh Hatch  Age/Sex: 74 y.o. male  :  1950        PCP: Salomon Mcknight MD  Chief Complaint   Patient presents with    Altered Mental Status      Subjective   Still having some numbness and tingling in his legs and still does not feel great.  He has not had a bowel movement in quite a few days.  He is a little distended and hyperactive on his bowel sounds.  General: No Fever or Chills, Cardiac: No Chest Pain or Palpitations, Resp: No Cough or SOA, GI: No Nausea, Vomiting, or Diarrhea, and Other: No bleeding    [Held by provider] aspirin, 81 mg, Oral, Daily  carbidopa-levodopa, 1.5 tablet, Oral, 4x Daily  carbidopa-levodopa CR, 1 tablet, Oral, Nightly  enoxaparin sodium, 40 mg, Subcutaneous, Q24H  gabapentin, 300 mg, Oral, BID  glucagon (human recombinant), 1 mg, Intravenous, Once  insulin lispro, 2-7 Units, Subcutaneous, 4x Daily AC & at Bedtime  LORazepam, 1 mg, Intravenous, Once  miconazole, 1 Application, Topical, BID  mirtazapine, 45 mg, Oral, Nightly  polyethylene glycol, 17 g, Oral, BID  sennosides-docusate, 1 tablet, Oral, BID  sertraline, 75 mg, Oral, Daily  sodium chloride, 10 mL, Intravenous, Q12H  vitamin B-12, 1,000 mcg, Oral, Daily           Objective   Vital Signs  Temp:  [97.5 °F (36.4 °C)-98.7 °F (37.1 °C)] 98.7 °F (37.1 °C)  Heart Rate:  [82-97] 97  Resp:  [16-18] 18  BP: (115-139)/(72-85) 132/73  Body mass index is 20.23 kg/m².    Intake/Output Summary (Last 24 hours) at 3/27/2025 1322  Last data filed at 3/27/2025 0835  Gross per 24 hour   Intake 340 ml   Output --   Net 340 ml       Physical Exam  Frail elderly chronic ill-appearing  Mildly confused and disoriented  Abdomen with positive bowel sounds but distended  No edema noted    Results Review:       I reviewed the patient's new clinical results.  Results from last 7 days   Lab Units 25  0524 25  0556 25  0650 25  0042   WBC 10*3/mm3  12.14* 8.59 9.19 13.34*   HEMOGLOBIN g/dL 13.1 11.8* 14.0 13.9   PLATELETS 10*3/mm3 282 229 244 293     Results from last 7 days   Lab Units 03/26/25  0524 03/23/25  0556 03/22/25  0650 03/22/25  0042   SODIUM mmol/L 140 141 139 141   POTASSIUM mmol/L 4.0 4.3 4.7 4.7   CHLORIDE mmol/L 106 110* 107 105   CO2 mmol/L 23.0 22.0 20.6* 24.6   BUN mg/dL 22 28* 36* 40*   CREATININE mg/dL 0.85 1.03 1.42* 1.51*   CALCIUM mg/dL 8.6 8.4* 9.1 9.0   MAGNESIUM mg/dL  --   --   --  2.2   Estimated Creatinine Clearance: 73.1 mL/min (by C-G formula based on SCr of 0.85 mg/dL).  Lab Results   Component Value Date    HGBA1C 8.30 (H) 03/12/2025    HGBA1C 8.10 (H) 10/13/2024    HGBA1C 9.2 (H) 08/24/2024     Glucose   Date/Time Value Ref Range Status   03/27/2025 1250 218 (H) 70 - 130 mg/dL Final   03/27/2025 0734 163 (H) 70 - 130 mg/dL Final   03/26/2025 2031 198 (H) 70 - 130 mg/dL Final   03/26/2025 1606 143 (H) 70 - 130 mg/dL Final   03/26/2025 1328 143 (H) 70 - 130 mg/dL Final   03/26/2025 0810 158 (H) 70 - 130 mg/dL Final   03/25/2025 2347 187 (H) 70 - 130 mg/dL Final   03/25/2025 1637 125 70 - 130 mg/dL Final         Assessment & Plan   Active Hospital Problems    Diagnosis  POA    **Altered mental status [R41.82]  Yes    DNR (do not resuscitate) [Z66]  Yes    Essential (primary) hypertension [I10]  Yes    Type 2 diabetes mellitus with hyperglycemia, with long-term current use of insulin [E11.65, Z79.4]  Not Applicable    Parkinson disease [G20.A1]  Yes    Dementia [F03.90]  Yes    Chronic indwelling Holland catheter [Z97.8]  Not Applicable      Resolved Hospital Problems   No resolved problems to display.       PLAN  This is a 74-year-old with history of Parkinson's dementia, hypertension, type 2 diabetes and chronic indwelling Holland catheter who presents to the hospital with increasing confusion and weakness and was found to have metabolic encephalopathy possibly related to acute renal failure and dehydration.  His stay is  complicated by ongoing neurologic symptoms in his lower extremities  -His renal function has returned to baseline.  Responded well to IV fluids.  -Holland catheter in place for chronic urinary retention  -Awaiting family decision and neurosurgery recommendations regarding surgical interventions.  -Discussed with the nursing staff he is got some constipation and probably could benefit from a suppository and some rectal stimulation given his cord compression he is likely to need assistance with a bowel movement.  -Mechanical DVT prophylaxis  - DNR      Disposition  Expected Discharge Date: 3/28/2025; Expected Discharge Time:        Celio Ugarte MD  [facility]  03/27/25  13:22 EDT

## 2025-03-28 ENCOUNTER — PREP FOR SURGERY (OUTPATIENT)
Dept: OTHER | Facility: HOSPITAL | Age: 75
End: 2025-03-28
Payer: MEDICARE

## 2025-03-28 DIAGNOSIS — M48.04 SPINAL STENOSIS OF THORACIC REGION: Primary | ICD-10-CM

## 2025-03-28 LAB
DEPRECATED RDW RBC AUTO: 45.1 FL (ref 37–54)
ERYTHROCYTE [DISTWIDTH] IN BLOOD BY AUTOMATED COUNT: 13.3 % (ref 12.3–15.4)
GLUCOSE BLDC GLUCOMTR-MCNC: 134 MG/DL (ref 70–130)
GLUCOSE BLDC GLUCOMTR-MCNC: 208 MG/DL (ref 70–130)
GLUCOSE BLDC GLUCOMTR-MCNC: 219 MG/DL (ref 70–130)
GLUCOSE BLDC GLUCOMTR-MCNC: 280 MG/DL (ref 70–130)
HCT VFR BLD AUTO: 38.9 % (ref 37.5–51)
HGB BLD-MCNC: 12.7 G/DL (ref 13–17.7)
MCH RBC QN AUTO: 30 PG (ref 26.6–33)
MCHC RBC AUTO-ENTMCNC: 32.6 G/DL (ref 31.5–35.7)
MCV RBC AUTO: 92 FL (ref 79–97)
PLATELET # BLD AUTO: 310 10*3/MM3 (ref 140–450)
PMV BLD AUTO: 9.3 FL (ref 6–12)
RBC # BLD AUTO: 4.23 10*6/MM3 (ref 4.14–5.8)
WBC NRBC COR # BLD AUTO: 10.41 10*3/MM3 (ref 3.4–10.8)

## 2025-03-28 PROCEDURE — 85027 COMPLETE CBC AUTOMATED: CPT | Performed by: HOSPITALIST

## 2025-03-28 PROCEDURE — 99232 SBSQ HOSP IP/OBS MODERATE 35: CPT

## 2025-03-28 PROCEDURE — 63710000001 INSULIN LISPRO (HUMAN) PER 5 UNITS: Performed by: NURSE PRACTITIONER

## 2025-03-28 PROCEDURE — 97110 THERAPEUTIC EXERCISES: CPT

## 2025-03-28 PROCEDURE — 99222 1ST HOSP IP/OBS MODERATE 55: CPT | Performed by: PSYCHIATRY & NEUROLOGY

## 2025-03-28 PROCEDURE — 82948 REAGENT STRIP/BLOOD GLUCOSE: CPT

## 2025-03-28 RX ADMIN — CARBIDOPA AND LEVODOPA 1 TABLET: 50; 200 TABLET, EXTENDED RELEASE ORAL at 21:39

## 2025-03-28 RX ADMIN — CARBIDOPA AND LEVODOPA 1.5 TABLET: 25; 250 TABLET ORAL at 21:39

## 2025-03-28 RX ADMIN — Medication 1000 MCG: at 08:47

## 2025-03-28 RX ADMIN — INSULIN LISPRO 3 UNITS: 100 INJECTION, SOLUTION INTRAVENOUS; SUBCUTANEOUS at 12:30

## 2025-03-28 RX ADMIN — CARBIDOPA AND LEVODOPA 1.5 TABLET: 25; 250 TABLET ORAL at 17:52

## 2025-03-28 RX ADMIN — SENNOSIDES AND DOCUSATE SODIUM 1 TABLET: 50; 8.6 TABLET ORAL at 08:48

## 2025-03-28 RX ADMIN — Medication 10 ML: at 21:40

## 2025-03-28 RX ADMIN — INSULIN LISPRO 3 UNITS: 100 INJECTION, SOLUTION INTRAVENOUS; SUBCUTANEOUS at 17:52

## 2025-03-28 RX ADMIN — MICONAZOLE NITRATE 1 APPLICATION: 20 CREAM TOPICAL at 21:41

## 2025-03-28 RX ADMIN — MIRTAZAPINE 45 MG: 15 TABLET, FILM COATED ORAL at 21:39

## 2025-03-28 RX ADMIN — GABAPENTIN 300 MG: 300 CAPSULE ORAL at 08:47

## 2025-03-28 RX ADMIN — GABAPENTIN 300 MG: 300 CAPSULE ORAL at 21:39

## 2025-03-28 RX ADMIN — CARBIDOPA AND LEVODOPA 1.5 TABLET: 25; 250 TABLET ORAL at 12:44

## 2025-03-28 RX ADMIN — SERTRALINE HYDROCHLORIDE 75 MG: 50 TABLET, FILM COATED ORAL at 08:47

## 2025-03-28 RX ADMIN — CARBIDOPA AND LEVODOPA 1.5 TABLET: 25; 250 TABLET ORAL at 08:48

## 2025-03-28 RX ADMIN — POLYETHYLENE GLYCOL 3350 17 G: 17 POWDER, FOR SOLUTION ORAL at 21:39

## 2025-03-28 RX ADMIN — INSULIN LISPRO 4 UNITS: 100 INJECTION, SOLUTION INTRAVENOUS; SUBCUTANEOUS at 21:47

## 2025-03-28 RX ADMIN — ACETAMINOPHEN 650 MG: 325 TABLET, FILM COATED ORAL at 00:57

## 2025-03-28 RX ADMIN — SENNOSIDES AND DOCUSATE SODIUM 1 TABLET: 50; 8.6 TABLET ORAL at 21:39

## 2025-03-28 RX ADMIN — POLYETHYLENE GLYCOL 3350 17 G: 17 POWDER, FOR SOLUTION ORAL at 08:47

## 2025-03-28 RX ADMIN — MICONAZOLE NITRATE 1 APPLICATION: 20 CREAM TOPICAL at 08:48

## 2025-03-28 RX ADMIN — Medication 10 ML: at 08:48

## 2025-03-28 RX ADMIN — CYCLOBENZAPRINE HYDROCHLORIDE 10 MG: 10 TABLET, FILM COATED ORAL at 00:57

## 2025-03-28 NOTE — CASE MANAGEMENT/SOCIAL WORK
Continued Stay Note  [facility]     Patient Name: Singh Hatch  MRN: [id number]  Today's Date: 3/28/2025    Admit Date: 3/22/2025    Plan: Plans return to [facility]/Medicaid bed hold when medically ready   Discharge Plan       Row Name 03/28/25 1819       Plan    Plan Plans return to [facility]/Medicaid bed hold when medically ready    Patient/Family in Agreement with Plan yes    Plan Comments Discharge plans unchanged. Plans return to [facility]/Medicaid bed hold when medically ready.Packet: office  Pharmacy: updated  Transport: need to arrange.....PRC                   Discharge Codes    No documentation.                 Expected Discharge Date and Time       Expected Discharge Date Expected Discharge Time    Mar 31, 2025               Viki Mcelroy RN

## 2025-03-28 NOTE — PLAN OF CARE
Goal Outcome Evaluation:  Plan of Care Reviewed With: patient        Progress: no change  Outcome Evaluation: Pt seen for PT tx this PM and tolerated the session fairly. Pt remained in bed and performed LE ther-ex for 10-15 reps each, requiring multiple rest breaks, and cues for attention to task. Pt noted to have decreased L quad strength and his quad, as well as, himself is quick to fatigue. Pt keeping his BLE in full ER and slight flexion, difficulty achieving neutral actively. PT will cont to follow and progress his mobility as recommended via DANNY and GOC.    Anticipated Discharge Disposition (PT): skilled nursing facility (Pending GOC)

## 2025-03-28 NOTE — CONSULTS
Neurology Consult Note    Referring Provider: Dr. Ugarte  Reason for Consultation: PD    History of present illness:    The patient is a 74 year old man who has had Parkinson's disease for 30 years, early onset per patient. Neurology is asked to help determine potential for benefit from surgery on thoracic compression fracutre.     He was followed at [email or web address] clinic, last visit 10/2023. At that time, he was graded Pascual and Yahr stage IV ( out of V stages).  He was also noted to have prominent non-motor PD complications including : sleep disturbance, depression, anxiety, urinary frequency/stress incontinence, and memory loss.   He was having frequent OFF episodes, taking > 1000 mg levodopa per day.   He was walking with  U-step walker.     Since last seen by outpatient neurology he has had several admissions, one with sepsis, and another with metabolic encephalopathy. Multiple ED visits for abdominal pain with constipation.    This admission was prompted by declining mental status with lethargy. This was felt to be due to mild SHALOM and pain medication.  Patient also complained of back pain and was known to have recent T11 compression fracture seen in abdominal CTs.  He also has remote history of lumbar surgery.    He has been receiving regular home doses of Sinemet since admission.    Past Medical History  Past Medical History:   Diagnosis Date    Age-related osteoporosis without current pathological fracture     Atherosclerotic heart disease of native coronary artery without angina pectoris     Essential (primary) hypertension     Irritant contact dermatitis due to fecal, urinary or dual incontinence     Long term (current) use of insulin     Mixed hyperlipidemia     Other cervical disc degeneration, unspecified cervical region     Other intervertebral disc degeneration, 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     Other obstructive and reflux uropathy     Other specified anxiety disorders      Parkinson's disease without dyskinesia, without mention of fluctuations     Personal history of malignant neoplasm of prostate     Type 2 diabetes mellitus with hyperglycemia     Unspecified dementia, unspecified severity, without behavioral disturbance, psychotic disturbance, mood disturbance, and anxiety     Vitamin D deficiency, unspecified        Past Surgical History  Lumbar fusion    Family History  History reviewed. No pertinent family history.    No Known Allergies    Social History  Social History     Socioeconomic History    Marital status:    Tobacco Use    Smoking status: Never     Passive exposure: Never    Smokeless tobacco: Never   Vaping Use    Vaping status: Never Used   Substance and Sexual Activity    Alcohol use: Never    Drug use: Not Currently    Sexual activity: Never     Birth control/protection: Abstinence       Review of Systems   Eyes: Negative.    Respiratory: Negative.     Cardiovascular: Negative.    Gastrointestinal:  Negative for nausea.   Genitourinary:  Positive for urgency.   Musculoskeletal:  Positive for gait problem.   Psychiatric/Behavioral:  Positive for confusion.        Medications  Scheduled Meds:[Held by provider] aspirin, 81 mg, Oral, Daily  carbidopa-levodopa, 1.5 tablet, Oral, 4x Daily  carbidopa-levodopa CR, 1 tablet, Oral, Nightly  [Held by provider] enoxaparin sodium, 40 mg, Subcutaneous, Q24H  gabapentin, 300 mg, Oral, BID  glucagon (human recombinant), 1 mg, Intravenous, Once  insulin lispro, 2-7 Units, Subcutaneous, 4x Daily AC & at Bedtime  LORazepam, 1 mg, Intravenous, Once  miconazole, 1 Application, Topical, BID  mirtazapine, 45 mg, Oral, Nightly  polyethylene glycol, 17 g, Oral, BID  sennosides-docusate, 1 tablet, Oral, BID  sertraline, 75 mg, Oral, Daily  sodium chloride, 10 mL, Intravenous, Q12H  vitamin B-12, 1,000 mcg, Oral, Daily    Vital Signs   Temp:  [98 °F (36.7 °C)-98.5 °F (36.9 °C)] 98.4 °F (36.9 °C)  Heart Rate:  [78-93] 81  Resp:  [16-20]  20  BP: (126-138)/(64-81) 134/81    Examination:  Constitutional: appears chronically ill  HENT:  normal  Eyes: Normal conjunctivae  CVS:  Regular rate and rhythm.  No murmurs.  Good peripheral perfusion.   Resp :   Nonlabored respirations  Musculoskeletal:  No signs of peripheral edema, normal range, no deformities  Skin:  No rash, normal turgor  Neurologic:    current exam performed 2 hours after last dose Sinemet   Alert oriented to month and year,not to exact date   Oriented partly to situation   Poor executive processing   Registration 2/3, recall 1/3  Moderate dysarthria  Jaw tremor  EOMF without nystagmus  Pupils  reactive  Face symmetric  Moderate to severe generalized bradykinesia  Moderate rigidity BUE  Severe rigidity BLE  Bilateral knee flexion and extension 5-/5  Bilateral hip flexion 4/5  Bilateral ankle flexion 5/5  Reflexes symmetric and not hyperactive  Plantar responses flexor  Gait not tested  Psychiatric: No anxiety, normal mood    Results Review:  Results from last 7 days   Lab Units 03/28/25  1405 03/26/25  0524 03/23/25  0556   WBC 10*3/mm3 10.41 12.14* 8.59   HEMOGLOBIN g/dL 12.7* 13.1 11.8*   HEMATOCRIT % 38.9 39.0 35.3*   PLATELETS 10*3/mm3 310 282 229        Results from last 7 days   Lab Units 03/26/25  0524 03/23/25  0556 03/22/25  0650 03/22/25  0042   SODIUM mmol/L 140 141 139 141   POTASSIUM mmol/L 4.0 4.3 4.7 4.7   CHLORIDE mmol/L 106 110* 107 105   CO2 mmol/L 23.0 22.0 20.6* 24.6   BUN mg/dL 22 28* 36* 40*   CREATININE mg/dL 0.85 1.03 1.42* 1.51*   CALCIUM mg/dL 8.6 8.4* 9.1 9.0   BILIRUBIN mg/dL  --   --   --  0.4   ALK PHOS U/L  --   --   --  132*   ALT (SGPT) U/L  --   --   --  5   AST (SGOT) U/L  --   --   --  26   GLUCOSE mg/dL 178* 101* 85 108*      Lab Results   Component Value Date    UKXMCJAM21 897 10/14/2024     Lab Results   Component Value Date    TSH 1.100 03/12/2025     Radiology  Thoracic MRI   The most significant pathology on this thoracic and lumbar spine MRI  is  at the T11-12 level. This patient has anterior marginal bridging  osteophytes in the thoracic spine from T5 down to T11 serving to  partially fuse the vertebrae anteriorly from T5 down to T11 and has  anterior marginal bridging osteophytes from T12 to L1 serving to fuse  the vertebrae anteriorly and has had prior fusion of the lumbar spine  from L2 to S1. At the T11-12 level, there is moderate bilateral facet  overgrowth, some fluid in the facet joints and there is a 2 mm  anterolisthesis of T11 with respect to T12. There is some air and fluid  within the T11-12 disc space and there are abnormal contents extending  from the level of the T11-12 disc space superiorly within the anterior  epidural space to the level of the junction of the mid and superior body  of T11 and within the right anterior epidural space measures up to 9 x 7  x 14 mm and within the left anterior epidural space measures up to 9 x 6  x 10 mm. This may all represent extruded disc material from the T11-12  disc space extending superiorly in the anterior epidural space along the  posterior aspect of the mid to inferior body and endplate of T11. The  possibility of infected contents in the anterior epidural space cannot  be entirely excluded. The combination of facet arthropathy and the  likely extruded disc material in the anterior epidural space from the  midbody of T11 to the T11-12 disc space efface the thecal sac, flatten  the thoracic cord resulting in severe canal narrowing from the midbody  of T11 down to the T11-12 disc space level with cord flattening and cord  compression and I cannot exclude some faint T2 high signal in the  substance of the cord from either edema or developing myelomalacia from  cord compression from the midbody of the T11 vertebra down to the T11-12  disc space level. There is also severe bilateral foraminal narrowing at  T11-12 that could compromise the exiting T11 nerve roots bilaterally.  Similar findings along the  back of the T11 vertebra and at the T11-12  disc space have been seen on the prior CT scans of the abdomen and  pelvis from 02/20/2025 to 03/12/2025. Furthermore, this patient has  exuberant bone marrow edema and enhancement throughout the majority of  the T11 and T12 vertebrae sparing the superior body of T11 and the  inferior body of T12. Given the fact that the patient has bony fusion of  the vertebrae above the T11 thoracic level and below the T12 thoracic  level and the patient is not fused at the T11-12 level, I feel that  there is likely instability at T11-12 and the fluid signal in the T11-12  disc space and exuberant marrow edema and enhancement throughout the T11  and T12 vertebrae is most probably all secondary to degenerative marrow  edema and enhancement due to instability at the T11-12 level.  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 Of course, superimposed infection with  discitis and osteomyelitis at T11-12 cannot be excluded.      Medical Decision Making and Recommendations  Parkinsons disease with associated dementia  Thoracic compression fracture with central canal stenosis    Patient was known to have advanced PD 18 months ago when last seen at [facility] movement disorder clinic. He was graded Pascual and Yahr stage 4 out of 5.   He had significant freezing on a robust dose of levodopa and prominent non-motor PD manifestations.     In this scenario, progression of weakness and gait difficulties could easily be due primarily to progression of parkinson's disease with thoracic myelopathy playing a secondary role.     Given the uncertainty of benefit from thoracic decompression and the very high risk of post op complications  from extensive surgery, especially delirium, I would only recommend pursuit of surgery if suspicion for osteomyelitis is high (which does not appear to be the case per chart notes) and tissues diagnosis is needed.     I discussed these findings and my recommendations with patient, primary care team, and consulting provider    Neurology signing off, please call if needed further.    Eduarda Mcknight MD  03/28/25      3:30 pm    Time based encounter:  Extensive chart review, history, exam, generating note and communicating with other members of care team.  70 minutes floor time.

## 2025-03-28 NOTE — THERAPY TREATMENT NOTE
Patient Name: Singh Hatch  : 1950    MRN: [id number]                              Today's Date: 3/28/2025       Admit Date: 3/22/2025    Visit Dx:     ICD-10-CM ICD-9-CM   1. Altered mental status, unspecified altered mental status type  R41.82 780.97   2. Non-traumatic rhabdomyolysis  M62.82 728.88   3. SHALOM (acute kidney injury)  N17.9 584.9   4. Polypharmacy  Z79.899 V58.69     Patient Active Problem List   Diagnosis    Chronic indwelling Holland catheter    Dementia    Parkinson disease    Type 2 diabetes mellitus with hyperglycemia, with long-term current use of insulin    Acute metabolic encephalopathy    Intractable abdominal pain    Constipation    Altered mental status    Essential (primary) hypertension    DNR (do not resuscitate)     Past Medical History:   Diagnosis Date    Age-related osteoporosis without current pathological fracture     Atherosclerotic heart disease of native coronary artery without angina pectoris     Essential (primary) hypertension     Irritant contact dermatitis due to fecal, urinary or dual incontinence     Long term (current) use of insulin     Mixed hyperlipidemia     Other cervical disc degeneration, unspecified cervical region     Other intervertebral disc degeneration, 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     Other obstructive and reflux uropathy     Other specified anxiety disorders     Parkinson's disease without dyskinesia, without mention of fluctuations     Personal history of malignant neoplasm of prostate     Type 2 diabetes mellitus with hyperglycemia     Unspecified dementia, unspecified severity, without behavioral disturbance, psychotic disturbance, mood disturbance, and anxiety     Vitamin D deficiency, unspecified      History reviewed. No pertinent surgical history.   General Information       Row Name 25 0947          Physical Therapy Time and Intention    Document Type therapy note (daily note)  -MG     Mode of Treatment individual  therapy;physical therapy  -MG       Row Name 03/28/25 0980          General Information    Patient Profile Reviewed yes  -MG     Existing Precautions/Restrictions fall;spinal  -MG     Barriers to Rehab medically complex;previous functional deficit  -MG       Row Name 03/28/25 0941          Safety Issues/Impairments Affecting Functional Mobility    Impairments Affecting Function (Mobility) balance;coordination;endurance/activity tolerance;pain;postural/trunk control;strength;range of motion (ROM)  -MG     Comment, Safety Issues/Impairments (Mobility) Remained supine in bed.  -MG               User Key  (r) = Recorded By, (t) = Taken By, (c) = Cosigned By      Initials Name Provider Type    MG Shonda Pollack, PT Physical Therapist                   Mobility    No documentation.                  Obj/Interventions       Row Name 03/28/25 0949          Motor Skills    Therapeutic Exercise other (see comments)  BLE x10-15: AP, GS, HS, Habd/add, slight hip flexion, SAQ, QS (difficulty on LLE), IR/ER. Increased time to complete. Rest breaks and demonstration required. Cues for consistent ROM.  -MG               User Key  (r) = Recorded By, (t) = Taken By, (c) = Cosigned By      Initials Name Provider Type    MG Shonda Pollack, PT Physical Therapist                   Goals/Plan    No documentation.                  Clinical Impression       Row Name 03/28/25 1004          Pain    Pretreatment Pain Rating 8/10  -MG     Posttreatment Pain Rating 8/10  -MG     Pain Location hip  -MG     Pain Side/Orientation left  -MG     Pain Management Interventions positioning techniques utilized;exercise or physical activity utilized  -MG     Response to Pain Interventions activity participation with tolerable pain  -MG       Row Name 03/28/25 1004          Plan of Care Review    Plan of Care Reviewed With patient  -MG     Progress no change  -MG     Outcome Evaluation Pt seen for PT tx this PM and tolerated the session fairly. Pt remained  in bed and performed LE ther-ex for 10-15 reps each, requiring multiple rest breaks, and cues for attention to task. Pt noted to have decreased L quad strength and his quad, as well as, himself is quick to fatigue. Pt keeping his BLE in full ER and slight flexion, difficulty achieving neutral actively. PT will cont to follow and progress his mobility as recommended via DANNY and GOC.  -MG       Row Name 03/28/25 1004          Therapy Assessment/Plan (PT)    Rehab Potential (PT) good  -MG     Criteria for Skilled Interventions Met (PT) yes;meets criteria  -MG     Therapy Frequency (PT) 5 times/wk  -MG       Row Name 03/28/25 1004          Positioning and Restraints    Pre-Treatment Position in bed  -MG     Post Treatment Position bed  -MG     In Bed notified nsg;supine;fowlers;encouraged to call for assist;exit alarm on;side rails up x3  -MG               User Key  (r) = Recorded By, (t) = Taken By, (c) = Cosigned By      Initials Name Provider Type    Shonda Segovia, PT Physical Therapist                   Outcome Measures    No documentation.                                Physical Therapy Education       Title: PT OT SLP Therapies (In Progress)       Topic: Physical Therapy (In Progress)       Point: Mobility training (In Progress)       Learning Progress Summary            Patient Acceptance, E,TB, NR by RF at 3/26/2025 0559    Acceptance, E, NR by ZT at 3/25/2025 1052    Acceptance, E,TB, VU,NR by AW at 3/24/2025 2322    Acceptance, E,TB, VU by KS at 3/24/2025 1849    Acceptance, E,TB, VU,NR by RF at 3/24/2025 0549    Acceptance, E,TB, VU by MS at 3/23/2025 1516                      Point: Home exercise program (Done)       Learning Progress Summary            Patient Acceptance, E,D, VU,NR by MG at 3/28/2025 1038    Acceptance, E,TB, NR by RF at 3/26/2025 0559    Acceptance, E, NR by ZT at 3/25/2025 1052    Acceptance, E,TB, VU,NR by AW at 3/24/2025 2322    Acceptance, E,TB, VU by KS at 3/24/2025 1849     Acceptance, E,TB, VU,NR by RF at 3/24/2025 0549    Acceptance, E,TB, VU by MS at 3/23/2025 1516                      Point: Body mechanics (In Progress)       Learning Progress Summary            Patient Acceptance, E,TB, NR by RF at 3/26/2025 0559    Acceptance, E, NR by ZT at 3/25/2025 1052    Acceptance, E,TB, VU,NR by AW at 3/24/2025 2322    Acceptance, E,TB, VU by KS at 3/24/2025 1849    Acceptance, E,TB, VU,NR by RF at 3/24/2025 0549    Acceptance, E,TB, VU by MS at 3/23/2025 1516                      Point: Precautions (Done)       Learning Progress Summary            Patient Acceptance, E,D, VU,NR by MG at 3/28/2025 1038    Acceptance, E,TB, NR by RF at 3/26/2025 0559    Acceptance, E, NR by ZT at 3/25/2025 1052    Acceptance, E,TB, VU,NR by AW at 3/24/2025 2322    Acceptance, E,TB, VU by KS at 3/24/2025 1849    Acceptance, E,TB, VU,NR by RF at 3/24/2025 0549    Acceptance, E,TB, VU by MS at 3/23/2025 1516                                      User Key       Initials Effective Dates Name Provider Type Discipline    MG 05/24/22 -  Shonda Pollack, PT Physical Therapist PT    MS 06/16/21 -  Leyla Baez PT Physical Therapist PT    AW 09/19/24 -  [place], April DIANA RN Registered Nurse Nurse    KS 10/30/24 -  Laura Morton, RN Registered Nurse Nurse    RF 01/17/25 -  Raphael Guevara RN Registered Nurse Nurse    ZT 01/29/25 -  Jay Hawley PT Student PT Student PT                  PT Recommendation and Plan     Progress: no change  Outcome Evaluation: Pt seen for PT tx this PM and tolerated the session fairly. Pt remained in bed and performed LE ther-ex for 10-15 reps each, requiring multiple rest breaks, and cues for attention to task. Pt noted to have decreased L quad strength and his quad, as well as, himself is quick to fatigue. Pt keeping his BLE in full ER and slight flexion, difficulty achieving neutral actively. PT will cont to follow and progress his mobility as recommended via DANNY and  [facility].     Time Calculation:         PT Charges       Row Name 03/28/25 1038             Time Calculation    Start Time 0945  -MG      Stop Time 1013  -MG      Time Calculation (min) 28 min  -MG      PT Received On 03/28/25  -MG      PT - Next Appointment 03/31/25  -MG                User Key  (r) = Recorded By, (t) = Taken By, (c) = Cosigned By      Initials Name Provider Type    MG Shonda Pollack, PT Physical Therapist                  Therapy Charges for Today       Code Description Service Date Service Provider Modifiers Qty    [id number] HC PT THER PROC EA 15 MIN 3/28/2025 Shonda Pollack, PT GP 2            PT G-Codes  Outcome Measure Options: AM-PAC 6 Clicks Basic Mobility (PT)  AM-PAC 6 Clicks Score (PT): 8  AM-PAC 6 Clicks Score (OT): 11  Modified [place] Scale: 4 - Moderately severe disability.  Unable to walk without assistance, and unable to attend to own bodily needs without assistance.  PT Discharge Summary  Anticipated Discharge Disposition (PT): skilled nursing facility (Pending GO)    Shonda Pollack PT  3/28/2025

## 2025-03-28 NOTE — PROGRESS NOTES
[facility] THORACIC/LUMBAR NEUROSURGERY PROGRESS NOTE      CC:weakness, thoracic cord compression      Subjective     Interval History: No acute events overnight. Reports no significant back pain unless movement/repositioning.         Objective     Vital signs in last 24 hours:  Temp:  [98 °F (36.7 °C)-98.5 °F (36.9 °C)] 98.5 °F (36.9 °C)  Heart Rate:  [78-93] 93  Resp:  [16-20] 20  BP: (126-138)/(64-81) 126/64    Intake/Output this shift:  No intake/output data recorded.    LABS:  Results from last 7 days   Lab Units 03/26/25  0524 03/23/25  0556 03/22/25  0650   WBC 10*3/mm3 12.14* 8.59 9.19   HEMOGLOBIN g/dL 13.1 11.8* 14.0   HEMATOCRIT % 39.0 35.3* 40.6   PLATELETS 10*3/mm3 282 229 244      Results from last 7 days   Lab Units 03/26/25 0524 03/23/25  0556 03/22/25  0650   SODIUM mmol/L 140 141 139   POTASSIUM mmol/L 4.0 4.3 4.7   CHLORIDE mmol/L 106 110* 107   CO2 mmol/L 23.0 22.0 20.6*   BUN mg/dL 22 28* 36*   CREATININE mg/dL 0.85 1.03 1.42*   GLUCOSE mg/dL 178* 101* 85   CALCIUM mg/dL 8.6 8.4* 9.1        IMAGING STUDIES:  No new imaging    I personally viewed and interpreted the patient's Chart, labs, and medications have been reviewed by and discussed with Dr. Cason .    Meds reviewed/changed: Yes  Aspirin EC 81 mg p.o. daily-last dose 3/26  Sinemet  mg 1.5 tablets p.o. 4 times daily  Sinemet CR  mg p.o. nightly  Lovenox 40 mg subcu daily  Gabapentin 300 mg p.o. twice daily  Tylenol 650 mg p.o. every 4 hours as needed x 1  Flexeril 10 mg p.o. 3 times daily as needed x 1    Physical Exam:    General:   Awake, alert.  Resting in bed.  Speech slightly slurred  Back:    +/- SLR  Motor:    Can lift right heel off bed. Able to slightly lift left heel off the bed.  Knee flexion extension 4 -/5, slightly stronger on the right.  Sensation:   Some numbness in L thigh, neuropathy in BLE at baseline  Station and Gait:             Per PT note 3/28: Pt remained in bed and performed LE ther-ex for 10-15 reps  each, requiring multiple rest breaks, and cues for attention to task. Pt noted to have decreased L quad strength and his quad, as well as, himself is quick to fatigue. Pt keeping his BLE in full ER and slight flexion, difficulty achieving neutral actively    Extremities:   Wearing SCD      Assessment & Plan     ASSESSMENT:      Altered mental status    Chronic indwelling Holland catheter    Dementia    Parkinson disease    Type 2 diabetes mellitus with hyperglycemia, with long-term current use of insulin    Essential (primary) hypertension    DNR (do not resuscitate)    74-year-old male with history of prostate CA, Parkinson's disease, chronic dementia, prior L3-L5 posterior lumbar fusion, and recent mild T12 endplate fracture from earlier this month  who presents from his facility with falls and BLE weakness. MRI reveals worsening compression fracture at T11 and T12 with significant stenosis and cord compression. Exam largely unchanged with BLE weakness worse on the left and proximally. Neurology to see and weigh in on benefit of possible thoracic surgery given PD. Dr. Cason will then discuss potential risks vs benefits of surgery with family. Will go ahead and make arrangements for potential surgery tomorrow to relieve thoracic cord compression pending family decision to proceed.  [facility] to provide surgical risk, appreciate their assistance with this.    PLAN:     Dr. Cason to discuss case with neurology and family to determine potential degree of benefit with surgery. [facility] to discuss surgical clearance in their note today. Will make patient NPO and hold their lovenox in event that surgery does take place tomorrow. Will keep him bedrest for now. Ok for exercises in bed with PT      I discussed the patient's findings and my recommendations with patient, nursing staff, primary care team, and Dr. Cason, and Dr. Ugarte    During patient visit, I utilized appropriate personal protective equipment including mask and gloves.  " Mask used was standard procedure mask. Appropriate PPE was worn during the entire visit.  Hand hygiene was completed before and after.      LOS: 3 days       Tiffany Merlos, APRN  3/28/2025  11:36 EDT    \"Dictated utilizing Dragon dictation\".     "

## 2025-03-28 NOTE — PROGRESS NOTES
[facility] Care    [phone]   LOS: 3 days     Name: Singh Hatch  Age/Sex: 74 y.o. male  :  1950        PCP: Salomon Mcknight MD  Chief Complaint   Patient presents with    Altered Mental Status      Subjective   Denies new issues or complaints this morning.  Rested well overnight.  Denies new issues or complaints awake and alert oriented to self  General: No Fever or Chills, Cardiac: No Chest Pain or Palpitations, Resp: No Cough or SOA, GI: No Nausea, Vomiting, or Diarrhea, and Other: No bleeding    [Held by provider] aspirin, 81 mg, Oral, Daily  carbidopa-levodopa, 1.5 tablet, Oral, 4x Daily  carbidopa-levodopa CR, 1 tablet, Oral, Nightly  [Held by provider] enoxaparin sodium, 40 mg, Subcutaneous, Q24H  gabapentin, 300 mg, Oral, BID  glucagon (human recombinant), 1 mg, Intravenous, Once  insulin lispro, 2-7 Units, Subcutaneous, 4x Daily AC & at Bedtime  LORazepam, 1 mg, Intravenous, Once  miconazole, 1 Application, Topical, BID  mirtazapine, 45 mg, Oral, Nightly  polyethylene glycol, 17 g, Oral, BID  sennosides-docusate, 1 tablet, Oral, BID  sertraline, 75 mg, Oral, Daily  sodium chloride, 10 mL, Intravenous, Q12H  vitamin B-12, 1,000 mcg, Oral, Daily           Objective   Vital Signs  Temp:  [98 °F (36.7 °C)-98.5 °F (36.9 °C)] 98.5 °F (36.9 °C)  Heart Rate:  [78-93] 93  Resp:  [16-20] 20  BP: (126-138)/(64-81) 126/64  Body mass index is 20.23 kg/m².    Intake/Output Summary (Last 24 hours) at 3/28/2025 1103  Last data filed at 3/28/2025 0000  Gross per 24 hour   Intake 300 ml   Output --   Net 300 ml       Physical Exam  Frail elderly chronic ill-appearing  Mildly confused and disoriented  Abdomen with positive bowel sounds but distended  No edema noted    Results Review:       I reviewed the patient's new clinical results.  Results from last 7 days   Lab Units 25  0524 25  0556 25  0650 25  0042   WBC 10*3/mm3 12.14* 8.59 9.19 13.34*   HEMOGLOBIN g/dL 13.1  11.8* 14.0 13.9   PLATELETS 10*3/mm3 282 229 244 293     Results from last 7 days   Lab Units 03/26/25  0524 03/23/25  0556 03/22/25  0650 03/22/25  0042   SODIUM mmol/L 140 141 139 141   POTASSIUM mmol/L 4.0 4.3 4.7 4.7   CHLORIDE mmol/L 106 110* 107 105   CO2 mmol/L 23.0 22.0 20.6* 24.6   BUN mg/dL 22 28* 36* 40*   CREATININE mg/dL 0.85 1.03 1.42* 1.51*   CALCIUM mg/dL 8.6 8.4* 9.1 9.0   MAGNESIUM mg/dL  --   --   --  2.2   Estimated Creatinine Clearance: 73.1 mL/min (by C-G formula based on SCr of 0.85 mg/dL).  Lab Results   Component Value Date    HGBA1C 8.30 (H) 03/12/2025    HGBA1C 8.10 (H) 10/13/2024    HGBA1C 9.2 (H) 08/24/2024     Glucose   Date/Time Value Ref Range Status   03/28/2025 0752 134 (H) 70 - 130 mg/dL Final   03/27/2025 2108 205 (H) 70 - 130 mg/dL Final   03/27/2025 2059 237 (H) 70 - 130 mg/dL Final   03/27/2025 1637 176 (H) 70 - 130 mg/dL Final   03/27/2025 1250 218 (H) 70 - 130 mg/dL Final   03/27/2025 0734 163 (H) 70 - 130 mg/dL Final   03/26/2025 2031 198 (H) 70 - 130 mg/dL Final   03/26/2025 1606 143 (H) 70 - 130 mg/dL Final         Assessment & Plan   Active Hospital Problems    Diagnosis  POA    **Altered mental status [R41.82]  Yes    DNR (do not resuscitate) [Z66]  Yes    Essential (primary) hypertension [I10]  Yes    Type 2 diabetes mellitus with hyperglycemia, with long-term current use of insulin [E11.65, Z79.4]  Not Applicable    Parkinson disease [G20.A1]  Yes    Dementia [F03.90]  Yes    Chronic indwelling Holland catheter [Z97.8]  Not Applicable      Resolved Hospital Problems   No resolved problems to display.       PLAN  This is a 74-year-old with history of Parkinson's dementia, hypertension, type 2 diabetes and chronic indwelling Holland catheter who presents to the hospital with increasing confusion and weakness and was found to have metabolic encephalopathy possibly related to acute renal failure and dehydration.  His stay is complicated by ongoing neurologic symptoms in his  lower extremities  -His renal function has returned to baseline.  Responded well to IV fluids.  Repeat labs ordered this morning  -Holland catheter in place for chronic urinary retention  -Awaiting family decision and neurosurgery recommendations regarding surgical interventions.  -From a perioperative and cardiovascular risk perspective he clearly has an elevated risk.  He has multiple risk factors including chronic kidney disease, coronary artery disease that is none of being able to stenting and recommendations for medical management, diabetes, and underlying dementia.  Unfortunately all of these risk factors are essentially nonmodifiable.  He is not having any evidence or symptoms of decompensated coronary artery disease his last cardiac cath in 2023 revealed nonobstructive disease but does have a history of systolic heart failure that was felt to be PVC mediated.  It is difficult to truly assess his risk accurately given his dementia.  He did have a perioperative risk assessment back in 2023 that was felt to be nonprohibitive for surgical intervention and any risk moving forward was nonmodifiable.  He has not followed up with a cardiologist and not had a repeat echo but in my opinion I feel like this is probably the same situation if anything his cardiac situation is probably improved some with medical management of his cardiac disease.  We could repeat an echocardiogram but I do not think it changes his overall risk perspective and would not change his overall decision making.  I called and discussed this with his brother on the phone.  We discussed his overall risk being elevated but nonmodifiable.  There is still quite on the fence about whether they want to move forward with surgical intervention.  They are pretty adamant that they do not want surgery to occur tomorrow.  -Currently holding aspirin and Lovenox for possible surgery.  -Continue bowel regimen  -Mechanical DVT prophylaxis  -  DNR      Disposition  Expected Discharge Date: 3/28/2025; Expected Discharge Time:        Celio Ugarte MD  [facility]  03/28/25  11:03 EDT

## 2025-03-28 NOTE — PLAN OF CARE
Problem: Adult Inpatient Plan of Care  Goal: Plan of Care Review  Outcome: Progressing  Goal: Patient-Specific Goal (Individualized)  Outcome: Progressing  Goal: Absence of Hospital-Acquired Illness or Injury  Outcome: Progressing  Intervention: Identify and Manage Fall Risk  Recent Flowsheet Documentation  Taken 3/28/2025 1815 by Rina Lezama RN  Safety Promotion/Fall Prevention:   activity supervised   assistive device/personal items within reach   clutter free environment maintained   fall prevention program maintained   nonskid shoes/slippers when out of bed   safety round/check completed   room organization consistent  Taken 3/28/2025 1615 by Rina Lezama RN  Safety Promotion/Fall Prevention:   activity supervised   assistive device/personal items within reach   clutter free environment maintained   fall prevention program maintained   nonskid shoes/slippers when out of bed   safety round/check completed   room organization consistent  Taken 3/28/2025 1415 by Rina Lezama RN  Safety Promotion/Fall Prevention:   activity supervised   assistive device/personal items within reach   clutter free environment maintained   fall prevention program maintained   nonskid shoes/slippers when out of bed   safety round/check completed   room organization consistent  Taken 3/28/2025 1215 by Rina Lezama RN  Safety Promotion/Fall Prevention:   activity supervised   assistive device/personal items within reach   clutter free environment maintained   fall prevention program maintained   nonskid shoes/slippers when out of bed   safety round/check completed   room organization consistent  Taken 3/28/2025 1015 by Rina Lezama RN  Safety Promotion/Fall Prevention:   activity supervised   assistive device/personal items within reach   clutter free environment maintained   fall prevention program maintained   nonskid shoes/slippers when out of bed   safety round/check completed   room organization  consistent  Taken 3/28/2025 0815 by Rina Lezama RN  Safety Promotion/Fall Prevention:   activity supervised   assistive device/personal items within reach   clutter free environment maintained   fall prevention program maintained   nonskid shoes/slippers when out of bed   safety round/check completed   room organization consistent  Intervention: Prevent Skin Injury  Recent Flowsheet Documentation  Taken 3/28/2025 0815 by Rina Lezama RN  Body Position:   weight shifting   supine  Skin Protection: incontinence pads utilized  Intervention: Prevent and Manage VTE (Venous Thromboembolism) Risk  Recent Flowsheet Documentation  Taken 3/28/2025 0815 by Rina Lezama RN  VTE Prevention/Management: SCDs (sequential compression devices) on  Intervention: Prevent Infection  Recent Flowsheet Documentation  Taken 3/28/2025 1815 by Rina Lezama RN  Infection Prevention:   single patient room provided   hand hygiene promoted  Taken 3/28/2025 1615 by Rina Lezama RN  Infection Prevention:   single patient room provided   hand hygiene promoted  Taken 3/28/2025 1415 by Rina Lezama RN  Infection Prevention:   single patient room provided   hand hygiene promoted  Taken 3/28/2025 1215 by Rina Lezama RN  Infection Prevention:   single patient room provided   hand hygiene promoted  Taken 3/28/2025 1015 by Rina Lezama RN  Infection Prevention:   single patient room provided   hand hygiene promoted  Taken 3/28/2025 0815 by Rina Lezama RN  Infection Prevention:   single patient room provided   hand hygiene promoted  Goal: Optimal Comfort and Wellbeing  Outcome: Progressing  Goal: Readiness for Transition of Care  Outcome: Progressing     Problem: Fall Injury Risk  Goal: Absence of Fall and Fall-Related Injury  Outcome: Progressing  Intervention: Identify and Manage Contributors  Recent Flowsheet Documentation  Taken 3/28/2025 0815 by Rina Lezama RN  Medication  Review/Management: medications reviewed  Intervention: Promote Injury-Free Environment  Recent Flowsheet Documentation  Taken 3/28/2025 1815 by Rina Lezama RN  Safety Promotion/Fall Prevention:   activity supervised   assistive device/personal items within reach   clutter free environment maintained   fall prevention program maintained   nonskid shoes/slippers when out of bed   safety round/check completed   room organization consistent  Taken 3/28/2025 1615 by Rina Lezama RN  Safety Promotion/Fall Prevention:   activity supervised   assistive device/personal items within reach   clutter free environment maintained   fall prevention program maintained   nonskid shoes/slippers when out of bed   safety round/check completed   room organization consistent  Taken 3/28/2025 1415 by Rina Lezama RN  Safety Promotion/Fall Prevention:   activity supervised   assistive device/personal items within reach   clutter free environment maintained   fall prevention program maintained   nonskid shoes/slippers when out of bed   safety round/check completed   room organization consistent  Taken 3/28/2025 1215 by Rina Lezama RN  Safety Promotion/Fall Prevention:   activity supervised   assistive device/personal items within reach   clutter free environment maintained   fall prevention program maintained   nonskid shoes/slippers when out of bed   safety round/check completed   room organization consistent  Taken 3/28/2025 1015 by Rina Lezama RN  Safety Promotion/Fall Prevention:   activity supervised   assistive device/personal items within reach   clutter free environment maintained   fall prevention program maintained   nonskid shoes/slippers when out of bed   safety round/check completed   room organization consistent  Taken 3/28/2025 0815 by Rina Lezama RN  Safety Promotion/Fall Prevention:   activity supervised   assistive device/personal items within reach   clutter free environment  maintained   fall prevention program maintained   nonskid shoes/slippers when out of bed   safety round/check completed   room organization consistent     Problem: Skin Injury Risk Increased  Goal: Skin Health and Integrity  Outcome: Progressing  Intervention: Optimize Skin Protection  Recent Flowsheet Documentation  Taken 3/28/2025 0815 by Rina Lezama RN  Activity Management: activity encouraged  Pressure Reduction Techniques: frequent weight shift encouraged  Head of Bed (HOB) Positioning: HOB at 20-30 degrees  Skin Protection: incontinence pads utilized     Problem: Violence Risk or Actual  Goal: Anger and Impulse Control  Outcome: Progressing  Intervention: Minimize Safety Risk  Recent Flowsheet Documentation  Taken 3/28/2025 1815 by Rina Lezama RN  De-Escalation Techniques: stimulation decreased  Enhanced Safety Measures: bed alarm set  Taken 3/28/2025 1615 by Rina Lezama RN  De-Escalation Techniques: stimulation decreased  Enhanced Safety Measures: bed alarm set  Taken 3/28/2025 1415 by Rina Lezama RN  De-Escalation Techniques: stimulation decreased  Enhanced Safety Measures: bed alarm set  Taken 3/28/2025 1215 by Rina Lezama RN  De-Escalation Techniques: stimulation decreased  Enhanced Safety Measures: bed alarm set  Taken 3/28/2025 1015 by Rina Lezama RN  De-Escalation Techniques: stimulation decreased  Enhanced Safety Measures: bed alarm set  Taken 3/28/2025 0815 by Rina Lezama RN  De-Escalation Techniques: stimulation decreased  Enhanced Safety Measures: bed alarm set     Problem: Constipation  Goal: Effective Bowel Elimination  Outcome: Progressing   Goal Outcome Evaluation:  Plan of Care Reviewed With: patient        Progress: no change

## 2025-03-28 NOTE — SIGNIFICANT NOTE
03/28/25 1254   OTHER   Discipline occupational therapist   Rehab Time/Intention   Session Not Performed other (see comments)  (DANNY recs bedrest per RN and plans for surgery francis 3/29. Will hold and f/u Monday pending surgical intervention. Defer to PT for bed level exercises today.)   Therapy Assessment/Plan (PT)   Criteria for Skilled Interventions Met (PT) yes;meets criteria   Recommendation   OT - Next Appointment 03/31/25

## 2025-03-29 PROBLEM — S22.080A COMPRESSION FRACTURE OF T11 VERTEBRA: Status: ACTIVE | Noted: 2025-03-29

## 2025-03-29 LAB
ALBUMIN SERPL-MCNC: 3.2 G/DL (ref 3.5–5.2)
ANION GAP SERPL CALCULATED.3IONS-SCNC: 10 MMOL/L (ref 5–15)
BUN SERPL-MCNC: 17 MG/DL (ref 8–23)
BUN/CREAT SERPL: 17.9 (ref 7–25)
CALCIUM SPEC-SCNC: 8.7 MG/DL (ref 8.6–10.5)
CHLORIDE SERPL-SCNC: 104 MMOL/L (ref 98–107)
CO2 SERPL-SCNC: 22 MMOL/L (ref 22–29)
CREAT SERPL-MCNC: 0.95 MG/DL (ref 0.76–1.27)
DEPRECATED RDW RBC AUTO: 43.6 FL (ref 37–54)
EGFRCR SERPLBLD CKD-EPI 2021: 85 ML/MIN/1.73
ERYTHROCYTE [DISTWIDTH] IN BLOOD BY AUTOMATED COUNT: 13.4 % (ref 12.3–15.4)
GLUCOSE BLDC GLUCOMTR-MCNC: 139 MG/DL (ref 70–130)
GLUCOSE BLDC GLUCOMTR-MCNC: 154 MG/DL (ref 70–130)
GLUCOSE BLDC GLUCOMTR-MCNC: 223 MG/DL (ref 70–130)
GLUCOSE BLDC GLUCOMTR-MCNC: 247 MG/DL (ref 70–130)
GLUCOSE SERPL-MCNC: 165 MG/DL (ref 65–99)
HCT VFR BLD AUTO: 37.2 % (ref 37.5–51)
HGB BLD-MCNC: 12.7 G/DL (ref 13–17.7)
MCH RBC QN AUTO: 30.7 PG (ref 26.6–33)
MCHC RBC AUTO-ENTMCNC: 34.1 G/DL (ref 31.5–35.7)
MCV RBC AUTO: 89.9 FL (ref 79–97)
PHOSPHATE SERPL-MCNC: 2.8 MG/DL (ref 2.5–4.5)
PLATELET # BLD AUTO: 308 10*3/MM3 (ref 140–450)
PMV BLD AUTO: 9.3 FL (ref 6–12)
POTASSIUM SERPL-SCNC: 4 MMOL/L (ref 3.5–5.2)
RBC # BLD AUTO: 4.14 10*6/MM3 (ref 4.14–5.8)
SODIUM SERPL-SCNC: 136 MMOL/L (ref 136–145)
WBC NRBC COR # BLD AUTO: 10.13 10*3/MM3 (ref 3.4–10.8)

## 2025-03-29 PROCEDURE — 82948 REAGENT STRIP/BLOOD GLUCOSE: CPT

## 2025-03-29 PROCEDURE — 80069 RENAL FUNCTION PANEL: CPT | Performed by: HOSPITALIST

## 2025-03-29 PROCEDURE — 85027 COMPLETE CBC AUTOMATED: CPT | Performed by: HOSPITALIST

## 2025-03-29 PROCEDURE — 63710000001 INSULIN LISPRO (HUMAN) PER 5 UNITS: Performed by: NURSE PRACTITIONER

## 2025-03-29 PROCEDURE — 99232 SBSQ HOSP IP/OBS MODERATE 35: CPT | Performed by: NURSE PRACTITIONER

## 2025-03-29 RX ORDER — OXYCODONE HYDROCHLORIDE 5 MG/1
5 TABLET ORAL EVERY 6 HOURS PRN
Status: DISCONTINUED | OUTPATIENT
Start: 2025-03-29 | End: 2025-03-30 | Stop reason: HOSPADM

## 2025-03-29 RX ADMIN — CARBIDOPA AND LEVODOPA 1.5 TABLET: 25; 250 TABLET ORAL at 21:11

## 2025-03-29 RX ADMIN — Medication 10 ML: at 21:11

## 2025-03-29 RX ADMIN — SERTRALINE HYDROCHLORIDE 75 MG: 50 TABLET, FILM COATED ORAL at 10:21

## 2025-03-29 RX ADMIN — GABAPENTIN 300 MG: 300 CAPSULE ORAL at 21:11

## 2025-03-29 RX ADMIN — MICONAZOLE NITRATE 1 APPLICATION: 20 CREAM TOPICAL at 10:26

## 2025-03-29 RX ADMIN — CARBIDOPA AND LEVODOPA 1 TABLET: 50; 200 TABLET, EXTENDED RELEASE ORAL at 21:10

## 2025-03-29 RX ADMIN — SENNOSIDES AND DOCUSATE SODIUM 1 TABLET: 50; 8.6 TABLET ORAL at 21:11

## 2025-03-29 RX ADMIN — CARBIDOPA AND LEVODOPA 1.5 TABLET: 25; 250 TABLET ORAL at 10:26

## 2025-03-29 RX ADMIN — POLYETHYLENE GLYCOL 3350 17 G: 17 POWDER, FOR SOLUTION ORAL at 10:25

## 2025-03-29 RX ADMIN — MIRTAZAPINE 45 MG: 15 TABLET, FILM COATED ORAL at 21:14

## 2025-03-29 RX ADMIN — OXYCODONE HYDROCHLORIDE 5 MG: 5 TABLET ORAL at 16:24

## 2025-03-29 RX ADMIN — POLYETHYLENE GLYCOL 3350 17 G: 17 POWDER, FOR SOLUTION ORAL at 21:15

## 2025-03-29 RX ADMIN — CARBIDOPA AND LEVODOPA 1.5 TABLET: 25; 250 TABLET ORAL at 17:18

## 2025-03-29 RX ADMIN — INSULIN LISPRO 3 UNITS: 100 INJECTION, SOLUTION INTRAVENOUS; SUBCUTANEOUS at 17:19

## 2025-03-29 RX ADMIN — Medication 10 ML: at 10:26

## 2025-03-29 RX ADMIN — INSULIN LISPRO 3 UNITS: 100 INJECTION, SOLUTION INTRAVENOUS; SUBCUTANEOUS at 21:12

## 2025-03-29 RX ADMIN — SENNOSIDES AND DOCUSATE SODIUM 1 TABLET: 50; 8.6 TABLET ORAL at 10:21

## 2025-03-29 RX ADMIN — GABAPENTIN 300 MG: 300 CAPSULE ORAL at 10:21

## 2025-03-29 RX ADMIN — CARBIDOPA AND LEVODOPA 1.5 TABLET: 25; 250 TABLET ORAL at 13:11

## 2025-03-29 RX ADMIN — Medication 1000 MCG: at 10:21

## 2025-03-29 NOTE — PROGRESS NOTES
Dedicated to Hospital Care    [phone]   LOS: 4 days     Name: Singh Hatch  Age/Sex: 74 y.o. male  :  1950        PCP: Salomon Mcknight MD  Chief Complaint   Patient presents with    Altered Mental Status      Subjective   Continues to endorse BLE leg pain   General: No Fever or Chills, Cardiac: No Chest Pain or Palpitations, Resp: No Cough or SOA, GI: No Nausea, Vomiting, or Diarrhea, and Other: No bleeding    aspirin, 81 mg, Oral, Daily  carbidopa-levodopa, 1.5 tablet, Oral, 4x Daily  carbidopa-levodopa CR, 1 tablet, Oral, Nightly  enoxaparin sodium, 40 mg, Subcutaneous, Q24H  gabapentin, 300 mg, Oral, BID  glucagon (human recombinant), 1 mg, Intravenous, Once  insulin lispro, 2-7 Units, Subcutaneous, 4x Daily AC & at Bedtime  LORazepam, 1 mg, Intravenous, Once  miconazole, 1 Application, Topical, BID  mirtazapine, 45 mg, Oral, Nightly  polyethylene glycol, 17 g, Oral, BID  sennosides-docusate, 1 tablet, Oral, BID  sertraline, 75 mg, Oral, Daily  sodium chloride, 10 mL, Intravenous, Q12H  vitamin B-12, 1,000 mcg, Oral, Daily           Objective   Vital Signs  Temp:  [97.3 °F (36.3 °C)-98.5 °F (36.9 °C)] 97.7 °F (36.5 °C)  Heart Rate:  [88-95] 89  Resp:  [18-20] 18  BP: (122-145)/(70-84) 135/74  Body mass index is 20.23 kg/m².  No intake or output data in the 24 hours ending 25 1316      Physical Exam  Frail elderly chronic ill-appearing  Mildly confused and disoriented  RRR, no m/r/g  Abdomen with positive bowel sounds but distended  No edema noted    Results Review:       I reviewed the patient's new clinical results.  Results from last 7 days   Lab Units 25  0647 25  1405 25  0524 25  0556   WBC 10*3/mm3 10.13 10.41 12.14* 8.59   HEMOGLOBIN g/dL 12.7* 12.7* 13.1 11.8*   PLATELETS 10*3/mm3 308 310 282 229     Results from last 7 days   Lab Units 25  0647 25  0524 25  0556   SODIUM mmol/L 136 140 141   POTASSIUM mmol/L 4.0 4.0 4.3   CHLORIDE mmol/L  104 106 110*   CO2 mmol/L 22.0 23.0 22.0   BUN mg/dL 17 22 28*   CREATININE mg/dL 0.95 0.85 1.03   CALCIUM mg/dL 8.7 8.6 8.4*   PHOSPHORUS mg/dL 2.8  --   --    Estimated Creatinine Clearance: 65.4 mL/min (by C-G formula based on SCr of 0.95 mg/dL).  Lab Results   Component Value Date    HGBA1C 8.30 (H) 03/12/2025    HGBA1C 8.10 (H) 10/13/2024    HGBA1C 9.2 (H) 08/24/2024     Glucose   Date/Time Value Ref Range Status   03/29/2025 1151 139 (H) 70 - 130 mg/dL Final   03/29/2025 0725 154 (H) 70 - 130 mg/dL Final   03/28/2025 2103 280 (H) 70 - 130 mg/dL Final   03/28/2025 1701 208 (H) 70 - 130 mg/dL Final   03/28/2025 1133 219 (H) 70 - 130 mg/dL Final   03/28/2025 0752 134 (H) 70 - 130 mg/dL Final   03/27/2025 2108 205 (H) 70 - 130 mg/dL Final   03/27/2025 2059 237 (H) 70 - 130 mg/dL Final         Assessment & Plan   Active Hospital Problems    Diagnosis  POA    **Altered mental status [R41.82]  Yes    Compression fracture of T11 vertebra [S22.080A]  Yes    Spinal stenosis of thoracic region [M48.04]  Yes    DNR (do not resuscitate) [Z66]  Yes    Essential (primary) hypertension [I10]  Yes    Type 2 diabetes mellitus with hyperglycemia, with long-term current use of insulin [E11.65, Z79.4]  Not Applicable    Parkinson disease [G20.A1]  Yes    Dementia [F03.90]  Yes    Chronic indwelling Holland catheter [Z97.8]  Not Applicable      Resolved Hospital Problems   No resolved problems to display.       PLAN  This is a 74-year-old with history of Parkinson's dementia, hypertension, type 2 diabetes and chronic indwelling Holland catheter who presents to the hospital with increasing confusion and weakness and was found to have metabolic encephalopathy possibly related to acute renal failure and dehydration.  His stay is complicated by ongoing neurologic symptoms in his lower extremities    Bilateral lower extremity pain/weakness  T11 vertebral compression fracture  Urinary retention  -The consensus seems to be that the patient is  not a good surgical candidate, and there would be questionable benefit from surgery as she could very well be that advancement of Parkinson's disease could be causing his lower extremity issues. Neurosurgery and neurology have seen  Neurosurgery has been following and recommend continuing TLSO brace while out of bed for 6 weeks from injury.  -He has many surgical risks and all those risks are essentially nonmodifiable.      T2DM  Blood sugars are well controlled on SSI    Lovenox for DVT ppx   DNR        Sebastian Zavala MD  [facility]  03/29/25  13:16 EDT

## 2025-03-29 NOTE — DISCHARGE INSTRUCTIONS
Brace until 6 weeks from injury:  No lift, push, pull more than 5 pounds, no swimming, no bending or twisting. No exertional or impact activity- walking is OK. Wear brace when sitting higher than 45 degrees, standing, walking. OK to remove brace for showers.

## 2025-03-29 NOTE — PROGRESS NOTES
[facility] THORACIC/LUMBAR NEUROSURGERY PROGRESS NOTE      CC: Thoracic stenosis T11, T12 compression fracture      Subjective     Interval History: Seen by neurology who recommends against surgery unless infectious concerns as he had advanced PD with both motor and cognitive issues.  Dr. Cason spoke with patient's brother yesterday evening.  Today he reports moderate back pain manageable with meds.  He is uncertain where his braces.    ROS:  MS: back pain  Neuro: Leg weakness  : Chronic incontinence    Objective     Vital signs in last 24 hours:  Temp:  [97.3 °F (36.3 °C)-98.5 °F (36.9 °C)] 97.7 °F (36.5 °C)  Heart Rate:  [81-95] 89  Resp:  [18-20] 18  BP: (122-145)/(70-84) 135/74    Intake/Output this shift:  No intake/output data recorded.    LABS:  Results from last 7 days   Lab Units 03/29/25  0647 03/28/25  1405 03/26/25  0524   WBC 10*3/mm3 10.13 10.41 12.14*   HEMOGLOBIN g/dL 12.7* 12.7* 13.1   HEMATOCRIT % 37.2* 38.9 39.0   PLATELETS 10*3/mm3 308 310 282     Results from last 7 days   Lab Units 03/29/25  0647 03/26/25  0524 03/23/25  0556   SODIUM mmol/L 136 140 141   POTASSIUM mmol/L 4.0 4.0 4.3   CHLORIDE mmol/L 104 106 110*   CO2 mmol/L 22.0 23.0 22.0   BUN mg/dL 17 22 28*   CREATININE mg/dL 0.95 0.85 1.03   CALCIUM mg/dL 8.7 8.6 8.4*   GLUCOSE mg/dL 165* 178* 101*     IMAGING STUDIES:  No new imaging    I personally viewed and interpreted the patient's chart.    Meds reviewed/changed: Yes    Gabapentin 300 mg p.o. twice daily  Lovenox 40 mg subcu daily-on hold  Aspirin 81 mg p.o. daily-on hold    Physical Exam:    General:   Awake, alert.  Sitting up in bed.  Pleasant and cooperative.  Back:    Reports has TLSO brace but not in room  Motor:    Right leg 5/5 except IP 4/5, left lower extremity 4/5 except IP 3/5   reflexes:   no clonus  Station and Gait:             Not tested  Extremities:   Wearing SCD      Assessment & Plan     ASSESSMENT:      Altered mental status    Chronic indwelling Holland  "catheter    Dementia    Parkinson disease    Type 2 diabetes mellitus with hyperglycemia, with long-term current use of insulin    Essential (primary) hypertension    DNR (do not resuscitate)    Spinal stenosis of thoracic region    Compression fracture of T11 vertebra    Patient with advanced Parkinson's disease and multiple falls which have resulted in compression fractures and canal stenosis.  Surgical intervention for decompression was offered and discussed with family who wanted further input from neurology regarding his prognosis with PD.  Seen by Dr. Wilks yesterday who feels that unless surgical intervention is needed for infection purposes, would not recommend pursuing.  Dr. Cason spoke with patient's brother yesterday evening and advised against surgery at this point.  He has no particular activity restrictions but should remain in his TLSO brace for a total of 6 weeks from time of injury when he is out of bed.  He has high fall risk and needs therapy but may benefit from use of wheelchair for safety.  Additionally, with his advanced PD, recommend considering hospice discussion with pt/family.  No neurosurgical follow-up needed.  We remain available for questions or concerns.    PLAN:   Continue TLSO brace while out of bed for total of 6 weeks from injury. otherwise no activity restrictions; however with PD fall risks, he may be safer using WC  Resume PT/OT   No neurosurgical follow-up needed  Recommend considering hospice care for what sounds to be end-stage Parkinson   Disease  Okay to resume aspirin and Lovenox    I discussed the patient's findings and my recommendations with patient, nursing staff, and Dr. Cason    During patient visit, I utilized appropriate personal protective equipment including gloves. Appropriate PPE was worn during the entire visit.  Hand hygiene was completed before and after.      LOS: 4 days       Gayatri Kat, APRN  3/29/2025  11:53 EDT    \"Dictated utilizing Dragon " "dictation\".      "

## 2025-03-29 NOTE — PLAN OF CARE
Goal Outcome Evaluation:  Plan of Care Reviewed With: patient, sibling, friend      Progress: improving     Patient ended up not having surgery this shift. TLSO retrieved from Janiya Reyes from friend Ana Maria. Pain medication ordered per MD so that patient can start working with PT, and getting up with assistance, see MAR. VSS, AAOX3, no new changes this shift, SCDs on. Patient in bed with call light within reach and bed alarm on.

## 2025-03-30 VITALS
HEIGHT: 71 IN | HEART RATE: 86 BPM | RESPIRATION RATE: 18 BRPM | BODY MASS INDEX: 20.31 KG/M2 | DIASTOLIC BLOOD PRESSURE: 80 MMHG | OXYGEN SATURATION: 96 % | SYSTOLIC BLOOD PRESSURE: 116 MMHG | WEIGHT: 145.06 LBS | TEMPERATURE: 97.3 F

## 2025-03-30 LAB
ALBUMIN SERPL-MCNC: 3.2 G/DL (ref 3.5–5.2)
ANION GAP SERPL CALCULATED.3IONS-SCNC: 7 MMOL/L (ref 5–15)
BUN SERPL-MCNC: 22 MG/DL (ref 8–23)
BUN/CREAT SERPL: 22 (ref 7–25)
CALCIUM SPEC-SCNC: 9 MG/DL (ref 8.6–10.5)
CHLORIDE SERPL-SCNC: 104 MMOL/L (ref 98–107)
CO2 SERPL-SCNC: 26 MMOL/L (ref 22–29)
CREAT SERPL-MCNC: 1 MG/DL (ref 0.76–1.27)
DEPRECATED RDW RBC AUTO: 43.1 FL (ref 37–54)
EGFRCR SERPLBLD CKD-EPI 2021: 80 ML/MIN/1.73
ERYTHROCYTE [DISTWIDTH] IN BLOOD BY AUTOMATED COUNT: 13.5 % (ref 12.3–15.4)
GLUCOSE BLDC GLUCOMTR-MCNC: 256 MG/DL (ref 70–130)
GLUCOSE SERPL-MCNC: 188 MG/DL (ref 65–99)
HCT VFR BLD AUTO: 37 % (ref 37.5–51)
HGB BLD-MCNC: 12.5 G/DL (ref 13–17.7)
MCH RBC QN AUTO: 30.1 PG (ref 26.6–33)
MCHC RBC AUTO-ENTMCNC: 33.8 G/DL (ref 31.5–35.7)
MCV RBC AUTO: 89.2 FL (ref 79–97)
PHOSPHATE SERPL-MCNC: 3.1 MG/DL (ref 2.5–4.5)
PLATELET # BLD AUTO: 315 10*3/MM3 (ref 140–450)
PMV BLD AUTO: 9.2 FL (ref 6–12)
POTASSIUM SERPL-SCNC: 4.1 MMOL/L (ref 3.5–5.2)
RBC # BLD AUTO: 4.15 10*6/MM3 (ref 4.14–5.8)
SODIUM SERPL-SCNC: 137 MMOL/L (ref 136–145)
WBC NRBC COR # BLD AUTO: 9.86 10*3/MM3 (ref 3.4–10.8)

## 2025-03-30 PROCEDURE — 85027 COMPLETE CBC AUTOMATED: CPT | Performed by: HOSPITALIST

## 2025-03-30 PROCEDURE — 80069 RENAL FUNCTION PANEL: CPT | Performed by: HOSPITALIST

## 2025-03-30 PROCEDURE — 82948 REAGENT STRIP/BLOOD GLUCOSE: CPT

## 2025-03-30 PROCEDURE — 63710000001 INSULIN LISPRO (HUMAN) PER 5 UNITS: Performed by: NURSE PRACTITIONER

## 2025-03-30 RX ORDER — OXYCODONE HYDROCHLORIDE 5 MG/1
5 TABLET ORAL EVERY 6 HOURS PRN
Qty: 12 TABLET | Refills: 0 | Status: SHIPPED | OUTPATIENT
Start: 2025-03-30 | End: 2025-04-03

## 2025-03-30 RX ADMIN — SENNOSIDES AND DOCUSATE SODIUM 1 TABLET: 50; 8.6 TABLET ORAL at 08:17

## 2025-03-30 RX ADMIN — INSULIN LISPRO 4 UNITS: 100 INJECTION, SOLUTION INTRAVENOUS; SUBCUTANEOUS at 08:16

## 2025-03-30 RX ADMIN — Medication 10 ML: at 08:21

## 2025-03-30 RX ADMIN — ASPIRIN 81 MG: 81 TABLET, COATED ORAL at 08:17

## 2025-03-30 RX ADMIN — POLYETHYLENE GLYCOL 3350 17 G: 17 POWDER, FOR SOLUTION ORAL at 08:16

## 2025-03-30 RX ADMIN — SERTRALINE HYDROCHLORIDE 75 MG: 50 TABLET, FILM COATED ORAL at 08:17

## 2025-03-30 RX ADMIN — MICONAZOLE NITRATE 1 APPLICATION: 20 CREAM TOPICAL at 08:21

## 2025-03-30 RX ADMIN — CARBIDOPA AND LEVODOPA 1.5 TABLET: 25; 250 TABLET ORAL at 08:17

## 2025-03-30 RX ADMIN — OXYCODONE HYDROCHLORIDE 5 MG: 5 TABLET ORAL at 08:17

## 2025-03-30 RX ADMIN — GABAPENTIN 300 MG: 300 CAPSULE ORAL at 08:17

## 2025-03-30 RX ADMIN — Medication 1000 MCG: at 08:17

## 2025-03-30 NOTE — PLAN OF CARE
Goal Outcome Evaluation:   VSS this shift. No neuro changes; A/Ox3. Pericolace & Miralax given; no BM this shift. No complaints of pain at rest throughout shift; verbalizes pain w/ turns; PT able to fall back asleep shortly after; no requests for pain medication intervention.

## 2025-03-30 NOTE — CASE MANAGEMENT/SOCIAL WORK
Case Management Discharge Note      Final Note: return to [facility] rehab via able care stretcher at 12:30 PM. RN updated and packet to be delivered to unit. Anisha CALDERON CCP    Provided Post Acute Provider List?: N/A  Provided Post Acute Provider Quality & Resource List?: N/A    Selected Continued Care - Admitted Since 3/22/2025       Destination Coordination complete.      Service Provider Services Address Phone Fax Patient Preferred    [facility] [address] [address] [phone] --              Durable Medical Equipment    No services have been selected for the patient.                Dialysis/Infusion    No services have been selected for the patient.                Home Medical Care    No services have been selected for the patient.                Therapy    No services have been selected for the patient.                Community Resources    No services have been selected for the patient.                Community & DME    No services have been selected for the patient.                    Selected Continued Care - Prior Encounters Includes continued care and service providers with selected services from prior encounters from 12/22/2024 to 3/30/2025      Discharged on 3/15/2025 Admission date: 3/12/2025 - Discharge disposition: Intermediate Care       Destination       Service Provider Services Address Phone Fax Patient Preferred    [facility] [address] [address] [phone] --                          Transportation Services  W/C [facility] (stretcher)    Final Discharge Disposition Code: 03 - skilled nursing facility (SNF)

## 2025-03-30 NOTE — DISCHARGE SUMMARY
Patient Name: Singh Hatch  : 1950  MRN: [id number]    Date of Admission: 3/22/2025  Date of Discharge:  3/30/2025  Primary Care Physician: Salomon Mcknight MD      Chief Complaint:   Altered Mental Status      Discharge Diagnoses     Active Hospital Problems    Diagnosis  POA    **Altered mental status [R41.82]  Yes    Compression fracture of T11 vertebra [S22.080A]  Yes    Spinal stenosis of thoracic region [M48.04]  Yes    DNR (do not resuscitate) [Z66]  Yes    Essential (primary) hypertension [I10]  Yes    Type 2 diabetes mellitus with hyperglycemia, with long-term current use of insulin [E11.65, Z79.4]  Not Applicable    Parkinson disease [G20.A1]  Yes    Dementia [F03.90]  Yes    Chronic indwelling Holland catheter [Z97.8]  Not Applicable      Resolved Hospital Problems   No resolved problems to display.        Hospital Course       This is a 74-year-old male with a past medical history of Parkinson's dementia, hypertension, type 2 diabetes who presented to hospital with increasing confusion, weakness related to acute renal failure and dehydration.  He also had bilateral lower extremity weakness and a T11 vertebral compression fracture.  He was seen in consultation with neurology and neurosurgery.  It was ultimately decided the head he had many nonmodifiable surgical risks and the risks of surgery for fixing the T11 compression fracture would not outweigh the impalpable benefits of the surgery as his symptoms were just as equally due to, if not more due to advancement of Parkinson's disorder  This was communicated with his daughter, Yasmin.  The surgical ultimately decision was made to not pursue surgery.  He was started on pain medication and discharged back to his long-term care facility.  It has been recommended that he continue wearing a TLSO brace while out of bed for 6 weeks from the injury.      Physical Exam:  Temp:  [97.3 °F (36.3 °C)-97.9 °F (36.6 °C)] 97.3 °F (36.3 °C)  Heart Rate:   [86-95] 86  Resp:  [18] 18  BP: (108-140)/(74-89) 116/80  Body mass index is 20.23 kg/m².  Physical Exam  Constitutional:       Appearance: He is ill-appearing.   Cardiovascular:      Rate and Rhythm: Normal rate and regular rhythm.   Pulmonary:      Effort: Pulmonary effort is normal. No respiratory distress.   Abdominal:      General: There is no distension.      Palpations: Abdomen is soft.      Tenderness: There is no abdominal tenderness.   Musculoskeletal:      Right lower leg: No edema.      Left lower leg: No edema.   Neurological:      Mental Status: He is alert. Mental status is at baseline.      Motor: Weakness present.         Consultants     Consult Orders (all) (From admission, onward)       Start     Ordered    03/27/25 1805  Inpatient Neurology Consult General  Once        Specialty:  Neurology  Provider:  Mansoor Leary MD    03/27/25 1805    03/27/25 1554  Inpatient Neurology Consult Other (see comments) (Parkinson's disease)  Once        Comments: Rick Cason [phone]   Specialty:  Neurology  Provider:  Mansoor Leary MD    03/27/25 1555    03/26/25 1334  Inpatient Neurosurgery Consult  Once        Specialty:  Neurosurgery  Provider:  Haider Desai MD    03/26/25 1334    03/25/25 1634  Inpatient Palliative Care Team Consult  Once,   Status:  Canceled        Provider:  (Not yet assigned)    03/25/25 1633    03/22/25 0438  [facility] (on-call MD unless specified) Details  Once        Specialty:  Hospitalist  Provider:  (Not yet assigned)    03/22/25 0437                  Procedures     Imaging Results (All)       Procedure Component Value Units Date/Time    MRI Lumbar Spine With & Without Contrast [[id number]] Collected: 03/26/25 1403     Updated: 03/27/25 0630    Narrative:      MRI OF THE THORACIC AND LUMBAR SPINE WITH AND WITHOUT CONTRAST ON  3/26/2025     CLINICAL HISTORY: This is a 74-year-old male patient who has back pain  and lower extremity numbness. The patient  personally reports thoracic  and lumbar pain with pain extending down legs with leg numbness and  tingling. The patient had prior lumbar spine surgery back on 03/26/2019  with L3 to L5 surgery and had subsequent surgery with fusion from L2 to  S2.      MRI OF THE THORACIC SPINE TECHNIQUE: Sagittal T1, fat-suppressed T2 and  postcontrast sagittal fat-suppressed T1-weighted images were obtained of  the thoracic spine. In addition, axial T1 and T2 and postcontrast axial  T1-weighted images were obtained from C7 to T7 and then from T6-7 down  to the T11-12 interspace level.     MRI OF THE LUMBAR SPINE TECHNIQUE: Sagittal T1, proton density and fast  spin echo T2 and fat-suppressed T2 and postcontrast sagittal T1-weighted  images were obtained of the lumbar spine. In addition, axial T1 and T2  and postcontrast axial T1-weighted images were obtained angled through  the interspaces from L1 to S1 and axial T2-weighted images were obtained  from the midbody of T12 down to the S2 sacral level.     There are no prior thoracic spine MRIs for comparison. This is  correlated to a remote prior lumbar spine MRI on 05/22/2020. Furthermore  this is correlated to multiple recent CT scans of the abdomen and pelvis  including 03/12/2025 and 03/04/2025 and 02/24/2025 and 02/20/2025.     FINDINGS ON THORACIC AND LUMBAR SPINE:     THORACIC SPINE: On the sagittal localizer image there is evidence of  prior cervical spine surgery with prior cervical fusion from C4 to C6  and there is the suggestion of areas of canal and foraminal narrowing in  the cervical spine, but the cervical spine is not adequately assessed.     There is some very minimal T1 low signal T2 high signal in the anterior  vertebrae and endplates at T2-3 with some minimal prevertebral edema at  this level could be due to prior trauma to right anterior marginal  bridging spurs at T2-3 that is subacute nature. Overall, the thoracic  vertebral body heights are well-maintained  from T1 to T10. The posterior  disc margins and facets are normal with no canal or foraminal narrowing  identified from C7 down to the superior body of T10. There are some  anterior marginal bridging osteophytes in the mid and lower thoracic  spine from T4 down to T10 that may serve to partially fuse the vertebrae  anteriorly. The thoracic cord is normal in signal intensity from T1 to  T10.     The striking finding on this exam is at the T11-12 thoracic level. At  T11-12, there is moderate facet overgrowth and fluid in the facet  joints. There is a minimal 2 mm anterolisthesis of T11 with respect to  T12. There is an abnormal appearance to the T11-12 disc space with  signal loss in the anterior disc space that is probably vacuum  phenomenon. There is T2 hyperintensity throughout the central to  posterior disc space suggesting some fluid signal in the disc space.  There is abnormal T1 low signal, T2 high signal and increased  enhancement throughout the majority of the T11 and T12 vertebrae sparing  the superior endplate and the posterior superior body of T11 and  inferior body and endplate of T12 compatible with exuberant bone marrow  edema and enhancement. Furthermore, there is some paravertebral soft  tissue thickening at the T11 and T12 levels. Furthermore, there are  abnormal contents in the anterior epidural space along the back of the  T11 vertebra from the superior body of T11 down to the T11-12 disc space  level and the contents are T1 intermediate signal, T2 isointense and may  partially enhance following contrast. I believe that there is likely  extruded disc material extending from the T11-12 disc space superiorly  along the posterior body of T12 in the right anterior epidural space. It  measures up to 9 x 7 x 14 mm in medial lateral, anterior posterior and  craniocaudal dimension and the left anterior epidural space up to 9 x 6  x 10 mm and there is uplifting of the posterior longitudinal ligament  off  the back of the mid and inferior body of T11. There is effacement of  the thecal sac, abutment and flattening of the thoracic cord with severe  canal narrowing and cord compression from the superior body of T11 down  to the T11-12 disc space level. Furthermore, there is synovial  thickening off the facets extending into the posterior foramina, bulging  disc material and some contents in the foramina severely narrowing the  foramina bilaterally that could compromise the exiting T11 nerve roots.  The fluid signal in the T11-12 disc space and the marrow edema and  enhancement in the adjacent T11-T12 vertebrae can be seen from infection  with discitis and osteomyelitis but can also be seen with instability at  T11-12. While the contents in the anterior epidural space along the back  of the T11 vertebra may all be disc material, superimposed phlegmon in  the anterior epidural space cannot be excluded. At the level of the  severe canal narrowing and cord flattening from the superior body of T11  to the T11-12 disc space, I cannot exclude some mild T2 hyperintensity  in the cord from cord edema or developing myelomalacia.     LUMBAR SPINE: The conus terminates at the L1 lumbar level which is  normal.     At T12-L1, the posterior disc margin and facets are normal with no canal  or foraminal narrowing.     This patient has had extensive prior lumbar spine surgery from L2 down  to S2 with bilateral laminectomies at L2, L3, L4 and L5 and bilateral  medial facetectomies at L2-3, L3-4, L4-5 and L5-S1. There are bilateral  rods bridging the posterior elements from L2 to S1 anchored by bilateral  pedicle screws at L2, L3, L4, L5 and S1 and there are distal screws  extending through the distal rods and sacral alae across the sacroiliac  joints and the medial iliac bones at the S2 level. There is marked  susceptibility artifact off the metal in the hardware which limits  evaluation of the canal and foramina. However, I see no  obvious canal  narrowing from L1 to S1 and I see no foraminal narrowing at L2-3. There  is mild-to-moderate bilateral foraminal narrowing at L3-4 and mild left  and mild-to-moderate right foraminal narrowing at L4-5 and some mild  right foraminal narrowing at L5-S1.       Impression:      1. This patient has had extensive prior surgery from L2 to S1 with  bilateral laminectomies at L2, L3, L4 and L5 and bilateral medial  facetectomies at L2-3, L3-4, L4-5 and L5-S1 and there are disc implants  in the L3-4, L4-5 and L5-S1 disc spaces and there are bilateral rods  bridging the posterior elements from L2 to S1 anchored by bilateral  pedicle screws at L2, L3, L4, L5 and S1 and distal screws extending  through the distal rods across the sacral alae at S2 and across the  sacroiliac joints into the medial iliac bones. Due to marked  susceptibility artifact off the metal in the hardware, it is difficult  to evaluate the canal and foramina from L2 to S1 but I see no obvious  canal narrowing from L2 to S1. There are some areas of residual bony  foraminal narrowing at L3-4, L4-5 and on the right at L5-S1.     2. At L1-2, there is disc space narrowing, degenerative endplate  changes, a 2 mm retrolisthesis of L1 with respect to L2 and mild  posterior spurring but there is essentially no canal narrowing. There is  only at most mild foraminal narrowing at L1-2.     3. Sagittal localizer images for the thoracic spine demonstrate evidence  of previous cervical spine surgery with cervical fusion from C4 to C6  and there is a suggestion of multilevel canal and foraminal narrowing in  the cervical spine but unfortunately the cervical spine is not  adequately assessed on the sagittal localizer images and could be  further evaluated with a cervical spine MRI if clinical symptoms  warrant.     4. There is some minimal marrow edema in the anterior vertebrae and  endplates at T2-3 extending to the base of anterior marginal spurs that  could  be due to subacute trauma to the base of the anterior marginal  spurs with some prevertebral edema at this level. Otherwise, the  thoracic spine is normal from C7 down to T10.     5. The most significant pathology on this thoracic and lumbar spine MRI  is at the T11-12 level. This patient has anterior marginal bridging  osteophytes in the thoracic spine from T5 down to T11 serving to  partially fuse the vertebrae anteriorly from T5 down to T11 and has  anterior marginal bridging osteophytes from T12 to L1 serving to fuse  the vertebrae anteriorly and has had prior fusion of the lumbar spine  from L2 to S1. At the T11-12 level, there is moderate bilateral facet  overgrowth, some fluid in the facet joints and there is a 2 mm  anterolisthesis of T11 with respect to T12. There is some air and fluid  within the T11-12 disc space and there are abnormal contents extending  from the level of the T11-12 disc space superiorly within the anterior  epidural space to the level of the junction of the mid and superior body  of T11 and within the right anterior epidural space measures up to 9 x 7  x 14 mm and within the left anterior epidural space measures up to 9 x 6  x 10 mm. This may all represent extruded disc material from the T11-12  disc space extending superiorly in the anterior epidural space along the  posterior aspect of the mid to inferior body and endplate of T11. The  possibility of infected contents in the anterior epidural space cannot  be entirely excluded. The combination of facet arthropathy and the  likely extruded disc material in the anterior epidural space from the  midbody of T11 to the T11-12 disc space efface the thecal sac, flatten  the thoracic cord resulting in severe canal narrowing from the midbody  of T11 down to the T11-12 disc space level with cord flattening and cord  compression and I cannot exclude some faint T2 high signal in the  substance of the cord from either edema or developing  myelomalacia from  cord compression from the midbody of the T11 vertebra down to the T11-12  disc space level. There is also severe bilateral foraminal narrowing at  T11-12 that could compromise the exiting T11 nerve roots bilaterally.  Similar findings along the back of the T11 vertebra and at the T11-12  disc space have been seen on the prior CT scans of the abdomen and  pelvis from 02/20/2025 to 03/12/2025. Furthermore, this patient has  exuberant bone marrow edema and enhancement throughout the majority of  the T11 and T12 vertebrae sparing the superior body of T11 and the  inferior body of T12. Given the fact that the patient has bony fusion of  the vertebrae above the T11 thoracic level and below the T12 thoracic  level and the patient is not fused at the T11-12 level, I feel that  there is likely instability at T11-12 and the fluid signal in the T11-12  disc space and exuberant marrow edema and enhancement throughout the T11  and T12 vertebrae is most probably all secondary to degenerative marrow  edema and enhancement due to instability at the T11-12 level.  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 Of course, superimposed infection with  discitis and osteomyelitis at T11-12 cannot be excluded. These findings  must be correlated clinically and with laboratory values including sed  rate and CRP. Due to the cord compression along the course of the T11  vertebra and at the T11-12 disc space and the lower extremity numbness,  neurosurgical consultation is warranted.     The results and recommendations from this exam were discussed with  Celio Ugarte MD, the hospitalist taking care of the patient, by  telephone on 03/26/2025 at 1:30 p.m.      This report was finalized on 3/27/2025 6:27 AM by Dr. David Whelan M.D  on Workstation: [id number]       MRI Thoracic Spine With & Without Contrast [[id number]] Collected: 03/26/25 1403     Updated: 03/27/25 0630    Narrative:      MRI OF THE THORACIC AND LUMBAR SPINE WITH AND WITHOUT CONTRAST ON  3/26/2025     CLINICAL HISTORY: This is a 74-year-old male patient who has back pain  and lower extremity numbness. The patient personally reports thoracic  and lumbar pain with pain extending down legs with leg numbness and  tingling. The patient had prior lumbar spine surgery back on 03/26/2019  with L3 to L5 surgery and had subsequent surgery with fusion from L2 to  S2.      MRI OF THE THORACIC SPINE TECHNIQUE: Sagittal T1, fat-suppressed T2 and  postcontrast sagittal fat-suppressed T1-weighted images were obtained of  the thoracic spine. In addition, axial T1 and T2 and postcontrast axial  T1-weighted images were obtained from C7 to T7 and then from T6-7 down  to the T11-12 interspace level.     MRI OF THE LUMBAR SPINE TECHNIQUE: Sagittal T1, proton density and fast  spin echo T2 and fat-suppressed T2 and postcontrast sagittal T1-weighted  images were obtained of the lumbar spine. In addition, axial T1 and T2  and postcontrast axial T1-weighted images were obtained angled through  the interspaces from L1 to S1 and axial T2-weighted images were obtained  from the midbody of T12 down to the S2 sacral level.     There are no prior thoracic spine MRIs for comparison. This is  correlated to a remote prior lumbar spine MRI on 05/22/2020. Furthermore  this is correlated to multiple recent CT scans of the abdomen and pelvis  including 03/12/2025 and 03/04/2025 and 02/24/2025 and 02/20/2025.     FINDINGS ON THORACIC AND LUMBAR SPINE:     THORACIC SPINE: On the sagittal localizer image there is evidence of  prior cervical spine surgery with prior cervical fusion from C4 to C6  and there is the suggestion of areas of canal  and foraminal narrowing in  the cervical spine, but the cervical spine is not adequately assessed.     There is some very minimal T1 low signal T2 high signal in the anterior  vertebrae and endplates at T2-3 with some minimal prevertebral edema at  this level could be due to prior trauma to right anterior marginal  bridging spurs at T2-3 that is subacute nature. Overall, the thoracic  vertebral body heights are well-maintained from T1 to T10. The posterior  disc margins and facets are normal with no canal or foraminal narrowing  identified from C7 down to the superior body of T10. There are some  anterior marginal bridging osteophytes in the mid and lower thoracic  spine from T4 down to T10 that may serve to partially fuse the vertebrae  anteriorly. The thoracic cord is normal in signal intensity from T1 to  T10.     The striking finding on this exam is at the T11-12 thoracic level. At  T11-12, there is moderate facet overgrowth and fluid in the facet  joints. There is a minimal 2 mm anterolisthesis of T11 with respect to  T12. There is an abnormal appearance to the T11-12 disc space with  signal loss in the anterior disc space that is probably vacuum  phenomenon. There is T2 hyperintensity throughout the central to  posterior disc space suggesting some fluid signal in the disc space.  There is abnormal T1 low signal, T2 high signal and increased  enhancement throughout the majority of the T11 and T12 vertebrae sparing  the superior endplate and the posterior superior body of T11 and  inferior body and endplate of T12 compatible with exuberant bone marrow  edema and enhancement. Furthermore, there is some paravertebral soft  tissue thickening at the T11 and T12 levels. Furthermore, there are  abnormal contents in the anterior epidural space along the back of the  T11 vertebra from the superior body of T11 down to the T11-12 disc space  level and the contents are T1 intermediate signal, T2 isointense and  may  partially enhance following contrast. I believe that there is likely  extruded disc material extending from the T11-12 disc space superiorly  along the posterior body of T12 in the right anterior epidural space. It  measures up to 9 x 7 x 14 mm in medial lateral, anterior posterior and  craniocaudal dimension and the left anterior epidural space up to 9 x 6  x 10 mm and there is uplifting of the posterior longitudinal ligament  off the back of the mid and inferior body of T11. There is effacement of  the thecal sac, abutment and flattening of the thoracic cord with severe  canal narrowing and cord compression from the superior body of T11 down  to the T11-12 disc space level. Furthermore, there is synovial  thickening off the facets extending into the posterior foramina, bulging  disc material and some contents in the foramina severely narrowing the  foramina bilaterally that could compromise the exiting T11 nerve roots.  The fluid signal in the T11-12 disc space and the marrow edema and  enhancement in the adjacent T11-T12 vertebrae can be seen from infection  with discitis and osteomyelitis but can also be seen with instability at  T11-12. While the contents in the anterior epidural space along the back  of the T11 vertebra may all be disc material, superimposed phlegmon in  the anterior epidural space cannot be excluded. At the level of the  severe canal narrowing and cord flattening from the superior body of T11  to the T11-12 disc space, I cannot exclude some mild T2 hyperintensity  in the cord from cord edema or developing myelomalacia.     LUMBAR SPINE: The conus terminates at the L1 lumbar level which is  normal.     At T12-L1, the posterior disc margin and facets are normal with no canal  or foraminal narrowing.     This patient has had extensive prior lumbar spine surgery from L2 down  to S2 with bilateral laminectomies at L2, L3, L4 and L5 and bilateral  medial facetectomies at L2-3, L3-4, L4-5 and  L5-S1. There are bilateral  rods bridging the posterior elements from L2 to S1 anchored by bilateral  pedicle screws at L2, L3, L4, L5 and S1 and there are distal screws  extending through the distal rods and sacral alae across the sacroiliac  joints and the medial iliac bones at the S2 level. There is marked  susceptibility artifact off the metal in the hardware which limits  evaluation of the canal and foramina. However, I see no obvious canal  narrowing from L1 to S1 and I see no foraminal narrowing at L2-3. There  is mild-to-moderate bilateral foraminal narrowing at L3-4 and mild left  and mild-to-moderate right foraminal narrowing at L4-5 and some mild  right foraminal narrowing at L5-S1.       Impression:      1. This patient has had extensive prior surgery from L2 to S1 with  bilateral laminectomies at L2, L3, L4 and L5 and bilateral medial  facetectomies at L2-3, L3-4, L4-5 and L5-S1 and there are disc implants  in the L3-4, L4-5 and L5-S1 disc spaces and there are bilateral rods  bridging the posterior elements from L2 to S1 anchored by bilateral  pedicle screws at L2, L3, L4, L5 and S1 and distal screws extending  through the distal rods across the sacral alae at S2 and across the  sacroiliac joints into the medial iliac bones. Due to marked  susceptibility artifact off the metal in the hardware, it is difficult  to evaluate the canal and foramina from L2 to S1 but I see no obvious  canal narrowing from L2 to S1. There are some areas of residual bony  foraminal narrowing at L3-4, L4-5 and on the right at L5-S1.     2. At L1-2, there is disc space narrowing, degenerative endplate  changes, a 2 mm retrolisthesis of L1 with respect to L2 and mild  posterior spurring but there is essentially no canal narrowing. There is  only at most mild foraminal narrowing at L1-2.     3. Sagittal localizer images for the thoracic spine demonstrate evidence  of previous cervical spine surgery with cervical fusion from C4 to  C6  and there is a suggestion of multilevel canal and foraminal narrowing in  the cervical spine but unfortunately the cervical spine is not  adequately assessed on the sagittal localizer images and could be  further evaluated with a cervical spine MRI if clinical symptoms  warrant.     4. There is some minimal marrow edema in the anterior vertebrae and  endplates at T2-3 extending to the base of anterior marginal spurs that  could be due to subacute trauma to the base of the anterior marginal  spurs with some prevertebral edema at this level. Otherwise, the  thoracic spine is normal from C7 down to T10.     5. The most significant pathology on this thoracic and lumbar spine MRI  is at the T11-12 level. This patient has anterior marginal bridging  osteophytes in the thoracic spine from T5 down to T11 serving to  partially fuse the vertebrae anteriorly from T5 down to T11 and has  anterior marginal bridging osteophytes from T12 to L1 serving to fuse  the vertebrae anteriorly and has had prior fusion of the lumbar spine  from L2 to S1. At the T11-12 level, there is moderate bilateral facet  overgrowth, some fluid in the facet joints and there is a 2 mm  anterolisthesis of T11 with respect to T12. There is some air and fluid  within the T11-12 disc space and there are abnormal contents extending  from the level of the T11-12 disc space superiorly within the anterior  epidural space to the level of the junction of the mid and superior body  of T11 and within the right anterior epidural space measures up to 9 x 7  x 14 mm and within the left anterior epidural space measures up to 9 x 6  x 10 mm. This may all represent extruded disc material from the T11-12  disc space extending superiorly in the anterior epidural space along the  posterior aspect of the mid to inferior body and endplate of T11. The  possibility of infected contents in the anterior epidural space cannot  be entirely excluded. The combination of facet  arthropathy and the  likely extruded disc material in the anterior epidural space from the  midbody of T11 to the T11-12 disc space efface the thecal sac, flatten  the thoracic cord resulting in severe canal narrowing from the midbody  of T11 down to the T11-12 disc space level with cord flattening and cord  compression and I cannot exclude some faint T2 high signal in the  substance of the cord from either edema or developing myelomalacia from  cord compression from the midbody of the T11 vertebra down to the T11-12  disc space level. There is also severe bilateral foraminal narrowing at  T11-12 that could compromise the exiting T11 nerve roots bilaterally.  Similar findings along the back of the T11 vertebra and at the T11-12  disc space have been seen on the prior CT scans of the abdomen and  pelvis from 02/20/2025 to 03/12/2025. Furthermore, this patient has  exuberant bone marrow edema and enhancement throughout the majority of  the T11 and T12 vertebrae sparing the superior body of T11 and the  inferior body of T12. Given the fact that the patient has bony fusion of  the vertebrae above the T11 thoracic level and below the T12 thoracic  level and the patient is not fused at the T11-12 level, I feel that  there is likely instability at T11-12 and the fluid signal in the T11-12  disc space and exuberant marrow edema and enhancement throughout the T11  and T12 vertebrae is most probably all secondary to degenerative marrow  edema and enhancement due to instability at the T11-12 level.  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 Of course, superimposed infection with  discitis and osteomyelitis at T11-12 cannot be excluded. These findings  must be  correlated clinically and with laboratory values including sed  rate and CRP. Due to the cord compression along the course of the T11  vertebra and at the T11-12 disc space and the lower extremity numbness,  neurosurgical consultation is warranted.     The results and recommendations from this exam were discussed with  Celio Ugarte MD, the hospitalist taking care of the patient, by  telephone on 03/26/2025 at 1:30 p.m.     This report was finalized on 3/27/2025 6:27 AM by Dr. David Whelan M.D  on Workstation: [id number]       MRI Outside Films [[id number]] Resulted: 03/24/25 1054     Updated: 03/24/25 1054    Narrative:      This procedure was auto-finalized with no dictation required.    XR Hips Bilateral With or Without Pelvis 2 View [[id number]] Collected: 03/23/25 1707     Updated: 03/23/25 1712    Narrative:      XR HIPS BILATERAL W OR WO PELVIS 2 VIEW-       HISTORY:   bilateral hip pain/pelvic pain; R41.82-Altered mental status,  unspecified; M62.82-Rhabdomyolysis; N17.9-Acute kidney failure,  unspecified; Z79.899-Other long term (current) drug therapy      TECHNIQUE:  Single AP view of the pelvis, with 2 views of the bilateral  hips.     FINDINGS:     Pelvis: Postoperative changes from prior lumbosacral surgery, no  evidence of acute fracture or dislocation, no lytic or blastic bone  lesion.     Right hip:  No acute fracture or dislocation, or lytic or blastic bone  lesion.     Left hip:  No acute fracture or dislocation, or lytic or blastic bone  lesion.       Impression:         No evidence of acute bony abnormality.        This report was finalized on 3/23/2025 5:09 PM by Dr. Haider Carlson M.D on Workstation: [id number]       CT Head Without Contrast [[id number]] Collected: 03/22/25 0519     Updated: 03/22/25 0519    Narrative:        Patient: DIMITRI WEBBER  Time Out: 05:18  Exam(s): CT HEAD Without Contrast     EXAM:    CT Head Without Intravenous Contrast    CLINICAL HISTORY:     Reason for  exam: AMS.    TECHNIQUE:    Axial computed tomography images of the head brain without intravenous   contrast.  CTDI is 55.96 mGy and DLP is 1040.4 mGy-cm.  This CT exam was   performed according to the principle of ALARA (As Low As Reasonably   Achievable) by using one or more of the following dose reduction   techniques: automated exposure control, adjustment of the mA and or kV   according to patient size, and or use of iterative reconstruction   technique.    COMPARISON:    No relevant prior studies available.    FINDINGS:    Brain:  No acute intracranial abnormality.  Consider MRI if there is   further concern.  Areas of decreased attenuation in the deep cerebral   white matter are consistent with small vessel ischemic degenerative   changes.  The cerebral and cerebellar sulci are prominent consistent with   brain atrophy.  No hemorrhage.    Ventricles:  Unremarkable.  No ventriculomegaly.    Bones joints:  Unremarkable.  No acute fracture.    Soft tissues:  Unremarkable.    Vasculature:  Atherosclerotic disease.    Sinuses:  Unremarkable as visualized.    Mastoid air cells:  Unremarkable as visualized.  No mastoid effusion.    Orbits:  Bilateral lens replacements.    IMPRESSION:       1.  No acute intracranial abnormality.  Consider MRI if there is further   concern.  2.  Small vessel ischemic degenerative changes.  3.  Cerebral and cerebellar atrophy.      Impression:          Electronically signed by Tawanda Kinney MD on 03-22-25 at 0518    XR Chest 1 View [[id number]] Collected: 03/22/25 0104     Updated: 03/22/25 0108    Narrative:      CXR ONE VIEW      HISTORY: [facility] Protocol     COMPARISON: 10/11/2024     TECHNIQUE: single portable AP       Impression:         Allowing for submaximal inspiratory result, heart size appears within  normal limits.     There is a submaximal inspiratory result. Allowing for that, there is no  convincing evidence of infiltrate, effusion or pneumothorax.           This report  "was finalized on 3/22/2025 1:05 AM by Dr. Haider Carlson M.D on Workstation: [id number]               Pertinent Labs     Results from last 7 days   Lab Units 03/30/25  0513 03/29/25  0647 03/28/25  1405 03/26/25  0524   WBC 10*3/mm3 9.86 10.13 10.41 12.14*   HEMOGLOBIN g/dL 12.5* 12.7* 12.7* 13.1   PLATELETS 10*3/mm3 315 308 310 282     Results from last 7 days   Lab Units 03/30/25  0512 03/29/25  0647 03/26/25  0524   SODIUM mmol/L 137 136 140   POTASSIUM mmol/L 4.1 4.0 4.0   CHLORIDE mmol/L 104 104 106   CO2 mmol/L 26.0 22.0 23.0   BUN mg/dL 22 17 22   CREATININE mg/dL 1.00 0.95 0.85   GLUCOSE mg/dL 188* 165* 178*   Estimated Creatinine Clearance: 62.1 mL/min (by C-G formula based on SCr of 1 mg/dL).  Results from last 7 days   Lab Units 03/30/25  0512 03/29/25  0647   ALBUMIN g/dL 3.2* 3.2*     Results from last 7 days   Lab Units 03/30/25  0512 03/29/25  0647 03/26/25  0524   CALCIUM mg/dL 9.0 8.7 8.6   ALBUMIN g/dL 3.2* 3.2*  --    PHOSPHORUS mg/dL 3.1 2.8  --                Invalid input(s): \"LDLCALC\"        Test Results Pending at Discharge       Discharge Details        Discharge Medications        New Medications        Instructions Start Date   oxyCODONE 5 MG immediate release tablet  Commonly known as: ROXICODONE   5 mg, Oral, Every 6 Hours PRN             Changes to Medications        Instructions Start Date   insulin glargine 100 UNIT/ML injection  Commonly known as: LANTUS, SEMGLEE  What changed: how much to take   20 Units, Subcutaneous, Nightly             Continue These Medications        Instructions Start Date   aspirin 81 MG EC tablet   81 mg, Daily      bisacodyl 10 MG suppository  Commonly known as: Dulcolax   10 mg, Rectal, Daily PRN      carbidopa-levodopa CR  MG per CR tablet  Commonly known as: SINEMET CR   1 tablet, Nightly      carbidopa-levodopa  MG per tablet  Commonly known as: SINEMET   1.5 tablets, 4 Times Daily      cyclobenzaprine 10 MG tablet  Commonly known as: " FLEXERIL   10 mg, Oral, 3 Times Daily PRN      dextrose 40 % gel  Commonly known as: GLUTOSE   15 g, Oral, Every 1 Hour PRN      dicyclomine 20 MG tablet  Commonly known as: BENTYL   20 mg, Oral, Every 6 Hours      gabapentin 300 MG capsule  Commonly known as: NEURONTIN   300 mg, 2 Times Daily      glucagon (human recombinant) 1 MG injection  Commonly known as: GLUCAGEN DIAGNOSTIC   1 mg, Intravenous, Once      Insulin Lispro 100 UNIT/ML injection  Commonly known as: humaLOG   2-10 Units, 3 Times Daily Before Meals      miconazole 2 % cream  Commonly known as: MICOTIN   1 Application, 2 Times Daily      mirtazapine 30 MG tablet  Commonly known as: REMERON   45 mg, Nightly      oxyCODONE-acetaminophen 5-325 MG per tablet  Commonly known as: Percocet   1 tablet, Oral, Every 4 Hours PRN      polyethylene glycol 17 g packet  Commonly known as: MIRALAX   17 g, Oral, 2 Times Daily      sennosides-docusate 8.6-50 MG per tablet  Commonly known as: PERICOLACE   1 tablet, Oral, 2 Times Daily      sertraline 25 MG tablet  Commonly known as: ZOLOFT   75 mg, Daily      vitamin B-12 1000 MCG tablet  Commonly known as: CYANOCOBALAMIN   1,000 mcg, Daily      vitamin D 1.25 MG (34940 UT) capsule capsule  Commonly known as: ERGOCALCIFEROL   50,000 Units, Weekly               No Known Allergies      Discharge Disposition:  Long Term Care (DC - External)    Discharge Diet:  Diet Order   Procedures    Diet: Diabetic; Consistent Carbohydrate; Fluid Consistency: Thin (IDDSI 0)       Discharge Activity:       CODE STATUS:    Code Status and Medical Interventions: No CPR (Do Not Attempt to Resuscitate); Limited Support; No cardioversion, No intubation (DNI); EMS DNR and d/w patients sister   Ordered at: 03/25/25 9819     Code Status (Patient has no pulse and is not breathing):    No CPR (Do Not Attempt to Resuscitate)     Medical Interventions (Patient has pulse or is breathing):    Limited Support     Medical Intervention Limits:    No  cardioversion       No intubation (DNI)     Comments:    EMS DNR and d/w patients sister       No future appointments.   Contact information for follow-up providers       Salomon Mcknight MD .    Specialty: Internal Medicine  Contact information:  [address] [facility] [place] [address] [phone]                       Contact information for after-discharge care       Destination       [facility] .    Service: Intermediate Care  Contact information:  [address] [facility] [address] [phone]                                   Time Spent on Discharge:  Greater than 30 minutes      Sebastian Zavala MD  [facility]  03/30/25  08:59 EDT

## 2025-03-30 NOTE — PLAN OF CARE
Goal Outcome Evaluation:  Plan of Care Reviewed With: patient, friend, sibling      Progress: improving   Discharge back to [facility] via [facility] stretcher [id number]